# Patient Record
Sex: MALE | Race: WHITE | Employment: OTHER
[De-identification: names, ages, dates, MRNs, and addresses within clinical notes are randomized per-mention and may not be internally consistent; named-entity substitution may affect disease eponyms.]

---

## 2019-02-07 ENCOUNTER — RESULTS ONLY (OUTPATIENT)
Dept: LAB | Age: 79
End: 2019-02-07

## 2019-02-07 ENCOUNTER — MEDICAL CORRESPONDENCE (OUTPATIENT)
Dept: HEALTH INFORMATION MANAGEMENT | Facility: CLINIC | Age: 79
End: 2019-02-07

## 2019-02-07 ENCOUNTER — TRANSFERRED RECORDS (OUTPATIENT)
Dept: HEALTH INFORMATION MANAGEMENT | Facility: CLINIC | Age: 79
End: 2019-02-07

## 2019-02-18 LAB — COPATH REPORT: NORMAL

## 2019-03-12 ENCOUNTER — TRANSFERRED RECORDS (OUTPATIENT)
Dept: HEALTH INFORMATION MANAGEMENT | Facility: CLINIC | Age: 79
End: 2019-03-12

## 2019-04-26 ENCOUNTER — TRANSFERRED RECORDS (OUTPATIENT)
Dept: HEALTH INFORMATION MANAGEMENT | Facility: CLINIC | Age: 79
End: 2019-04-26

## 2019-05-30 ENCOUNTER — APPOINTMENT (OUTPATIENT)
Dept: CT IMAGING | Facility: HOSPITAL | Age: 79
DRG: 907 | End: 2019-05-30
Attending: EMERGENCY MEDICINE
Payer: MEDICARE

## 2019-05-30 ENCOUNTER — HOSPITAL ENCOUNTER (INPATIENT)
Facility: HOSPITAL | Age: 79
LOS: 11 days | Discharge: NURSING, REHAB OR LONG-TERM CARE | DRG: 907 | End: 2019-06-11
Attending: EMERGENCY MEDICINE | Admitting: SURGERY
Payer: MEDICARE

## 2019-05-30 DIAGNOSIS — K56.609 SBO (SMALL BOWEL OBSTRUCTION) (H): ICD-10-CM

## 2019-05-30 DIAGNOSIS — G93.41 ACUTE METABOLIC ENCEPHALOPATHY: Primary | ICD-10-CM

## 2019-05-30 DIAGNOSIS — K63.1 SMALL BOWEL PERFORATION (H): ICD-10-CM

## 2019-05-30 LAB
ANION GAP SERPL CALCULATED.3IONS-SCNC: 11 MMOL/L (ref 3–14)
BASOPHILS # BLD AUTO: 0 10E9/L (ref 0–0.2)
BASOPHILS NFR BLD AUTO: 0.3 %
BUN SERPL-MCNC: 26 MG/DL (ref 7–30)
CALCIUM SERPL-MCNC: 8.9 MG/DL (ref 8.5–10.1)
CHLORIDE SERPL-SCNC: 104 MMOL/L (ref 94–109)
CO2 SERPL-SCNC: 25 MMOL/L (ref 20–32)
CREAT SERPL-MCNC: 1.09 MG/DL (ref 0.66–1.25)
DIFFERENTIAL METHOD BLD: ABNORMAL
EOSINOPHIL # BLD AUTO: 0.1 10E9/L (ref 0–0.7)
EOSINOPHIL NFR BLD AUTO: 0.5 %
ERYTHROCYTE [DISTWIDTH] IN BLOOD BY AUTOMATED COUNT: 16.1 % (ref 10–15)
GFR SERPL CREATININE-BSD FRML MDRD: 63 ML/MIN/{1.73_M2}
GLUCOSE SERPL-MCNC: 170 MG/DL (ref 70–99)
HCT VFR BLD AUTO: 42.2 % (ref 40–53)
HGB BLD-MCNC: 14.4 G/DL (ref 13.3–17.7)
IMM GRANULOCYTES # BLD: 0 10E9/L (ref 0–0.4)
IMM GRANULOCYTES NFR BLD: 0.3 %
LACTATE BLD-SCNC: 2 MMOL/L (ref 0.7–2)
LYMPHOCYTES # BLD AUTO: 1.7 10E9/L (ref 0.8–5.3)
LYMPHOCYTES NFR BLD AUTO: 14.4 %
MCH RBC QN AUTO: 30.4 PG (ref 26.5–33)
MCHC RBC AUTO-ENTMCNC: 34.1 G/DL (ref 31.5–36.5)
MCV RBC AUTO: 89 FL (ref 78–100)
MONOCYTES # BLD AUTO: 0.7 10E9/L (ref 0–1.3)
MONOCYTES NFR BLD AUTO: 5.7 %
NEUTROPHILS # BLD AUTO: 9.3 10E9/L (ref 1.6–8.3)
NEUTROPHILS NFR BLD AUTO: 78.8 %
NRBC # BLD AUTO: 0 10*3/UL
NRBC BLD AUTO-RTO: 0 /100
PLATELET # BLD AUTO: 256 10E9/L (ref 150–450)
POTASSIUM SERPL-SCNC: 3.7 MMOL/L (ref 3.4–5.3)
RBC # BLD AUTO: 4.73 10E12/L (ref 4.4–5.9)
SODIUM SERPL-SCNC: 140 MMOL/L (ref 133–144)
WBC # BLD AUTO: 11.9 10E9/L (ref 4–11)

## 2019-05-30 PROCEDURE — 96374 THER/PROPH/DIAG INJ IV PUSH: CPT

## 2019-05-30 PROCEDURE — 83605 ASSAY OF LACTIC ACID: CPT | Performed by: EMERGENCY MEDICINE

## 2019-05-30 PROCEDURE — 99285 EMERGENCY DEPT VISIT HI MDM: CPT | Mod: 25

## 2019-05-30 PROCEDURE — 74176 CT ABD & PELVIS W/O CONTRAST: CPT | Mod: TC

## 2019-05-30 PROCEDURE — 80048 BASIC METABOLIC PNL TOTAL CA: CPT | Performed by: EMERGENCY MEDICINE

## 2019-05-30 PROCEDURE — 85025 COMPLETE CBC W/AUTO DIFF WBC: CPT | Performed by: EMERGENCY MEDICINE

## 2019-05-30 PROCEDURE — 96361 HYDRATE IV INFUSION ADD-ON: CPT

## 2019-05-30 PROCEDURE — 99285 EMERGENCY DEPT VISIT HI MDM: CPT | Mod: Z6 | Performed by: EMERGENCY MEDICINE

## 2019-05-30 PROCEDURE — 25000128 H RX IP 250 OP 636: Performed by: EMERGENCY MEDICINE

## 2019-05-30 PROCEDURE — 96375 TX/PRO/DX INJ NEW DRUG ADDON: CPT

## 2019-05-30 PROCEDURE — 85610 PROTHROMBIN TIME: CPT | Performed by: EMERGENCY MEDICINE

## 2019-05-30 PROCEDURE — 83880 ASSAY OF NATRIURETIC PEPTIDE: CPT | Performed by: EMERGENCY MEDICINE

## 2019-05-30 PROCEDURE — 25000128 H RX IP 250 OP 636

## 2019-05-30 RX ORDER — HYDROMORPHONE HYDROCHLORIDE 1 MG/ML
0.5 INJECTION, SOLUTION INTRAMUSCULAR; INTRAVENOUS; SUBCUTANEOUS ONCE
Status: COMPLETED | OUTPATIENT
Start: 2019-05-30 | End: 2019-05-30

## 2019-05-30 RX ORDER — ONDANSETRON 2 MG/ML
4 INJECTION INTRAMUSCULAR; INTRAVENOUS ONCE
Status: COMPLETED | OUTPATIENT
Start: 2019-05-30 | End: 2019-05-30

## 2019-05-30 RX ADMIN — HYDROMORPHONE HYDROCHLORIDE 1 MG: 1 INJECTION, SOLUTION INTRAMUSCULAR; INTRAVENOUS; SUBCUTANEOUS at 23:54

## 2019-05-30 RX ADMIN — HYDROMORPHONE HYDROCHLORIDE 0.5 MG: 1 INJECTION, SOLUTION INTRAMUSCULAR; INTRAVENOUS; SUBCUTANEOUS at 23:27

## 2019-05-30 RX ADMIN — SODIUM CHLORIDE 1000 ML: 9 INJECTION, SOLUTION INTRAVENOUS at 23:27

## 2019-05-30 RX ADMIN — ONDANSETRON 4 MG: 2 INJECTION INTRAMUSCULAR; INTRAVENOUS at 23:27

## 2019-05-30 ASSESSMENT — ENCOUNTER SYMPTOMS
ABDOMINAL PAIN: 1
VOMITING: 0
BACK PAIN: 0
NAUSEA: 1
ABDOMINAL DISTENTION: 1
CHEST TIGHTNESS: 0

## 2019-05-30 NOTE — LETTER
[facility]  [address]  [place] [address]  [phone]    FACSIMILE TRANSMITTAL SHEET    TO: ***   COMPANY: ***  FAX NUMBER: ***  PHONE NUMBER: ***     FROM: ***  PHONE: ***  DATE: 06/06/19  NUMBER OF PAGES: ***    _____URGENT _____REVIEW ONLY _____PLEASE COMMENT____PLEASE REPLY    NOTES/COMMENTS: ***                                      IF YOU DID NOT RECEIVE THE CORRECT NUMBER OF PAGES OR THE FAX DID NOT COME THROUGH CLEARLY, PLEASE CALL THE SENDER     CONFIDENTIALITY STATEMENT: Confidential information that may accompany this transmission contains protected health information under state and federal law and is legally privileged. This information is intended only for the use of the individual or entity named above and may be used only for carrying out treatment, payment or other healthcare operations. The recipient or person responsible for delivering this information is prohibited by law from disclosing this information without proper authorization to any other party, unless required to do so by law or regulation. If you are not the intended recipient, you are hereby notified that any review, dissemination, distribution, or copying of this message is strictly prohibited. If you have received this communication in error, please destroy the materials and contact us immediately by calling the number listed above. No response indicates that the information was received by the appropriate authorized party

## 2019-05-30 NOTE — LETTER
[facility]  [address]  [place] [address]  [phone]    FACSIMILE TRANSMITTAL SHEET    TO: ***   COMPANY: ***  FAX NUMBER: ***  PHONE NUMBER: ***     FROM: ***  PHONE: ***  DATE: 06/06/19  NUMBER OF PAGES: ***    _____URGENT _____REVIEW ONLY _____PLEASE COMMENT____PLEASE REPLY    NOTES/COMMENTS: ***                                      IF YOU DID NOT RECEIVE THE CORRECT NUMBER OF PAGES OR THE FAX DID NOT COME THROUGH CLEARLY, PLEASE CALL THE SENDER     CONFIDENTIALITY STATEMENT: Confidential information that may accompany this transmission contains protected health information under state and federal law and is legally privileged. This information is intended only for the use of the individual or entity named above and may be used only for carrying out treatment, payment or other healthcare operations. The recipient or person responsible for delivering this information is prohibited by law from disclosing this information without proper authorization to any other party, unless required to do so by law or regulation. If you are not the intended recipient, you are hereby notified that any review, dissemination, distribution, or copying of this message is strictly prohibited. If you have received this communication in error, please destroy the materials and contact us immediately by calling the number listed above. No response indicates that the information was received by the appropriate authorized party

## 2019-05-31 ENCOUNTER — ANESTHESIA EVENT (OUTPATIENT)
Dept: INTENSIVE CARE | Facility: HOSPITAL | Age: 79
DRG: 907 | End: 2019-05-31
Payer: MEDICARE

## 2019-05-31 ENCOUNTER — ANESTHESIA (OUTPATIENT)
Dept: INTENSIVE CARE | Facility: HOSPITAL | Age: 79
DRG: 907 | End: 2019-05-31
Payer: MEDICARE

## 2019-05-31 ENCOUNTER — ANESTHESIA EVENT (OUTPATIENT)
Dept: SURGERY | Facility: HOSPITAL | Age: 79
DRG: 907 | End: 2019-05-31
Payer: MEDICARE

## 2019-05-31 ENCOUNTER — ANESTHESIA (OUTPATIENT)
Dept: SURGERY | Facility: HOSPITAL | Age: 79
DRG: 907 | End: 2019-05-31
Payer: MEDICARE

## 2019-05-31 ENCOUNTER — APPOINTMENT (OUTPATIENT)
Dept: GENERAL RADIOLOGY | Facility: HOSPITAL | Age: 79
DRG: 907 | End: 2019-05-31
Attending: HOSPITALIST
Payer: MEDICARE

## 2019-05-31 ENCOUNTER — APPOINTMENT (OUTPATIENT)
Dept: GENERAL RADIOLOGY | Facility: HOSPITAL | Age: 79
DRG: 907 | End: 2019-05-31
Attending: NURSE ANESTHETIST, CERTIFIED REGISTERED
Payer: MEDICARE

## 2019-05-31 ENCOUNTER — APPOINTMENT (OUTPATIENT)
Dept: GENERAL RADIOLOGY | Facility: HOSPITAL | Age: 79
DRG: 907 | End: 2019-05-31
Attending: NURSE PRACTITIONER
Payer: MEDICARE

## 2019-05-31 PROBLEM — K63.1 SMALL BOWEL PERFORATION (H): Status: ACTIVE | Noted: 2019-05-31

## 2019-05-31 PROBLEM — A41.9 SEPSIS (H): Status: ACTIVE | Noted: 2019-05-31

## 2019-05-31 PROBLEM — K56.609 SBO (SMALL BOWEL OBSTRUCTION) (H): Status: ACTIVE | Noted: 2019-05-31

## 2019-05-31 PROBLEM — R10.9 ACUTE ABDOMINAL PAIN: Status: ACTIVE | Noted: 2019-05-31

## 2019-05-31 PROBLEM — Z95.0 CARDIAC PACEMAKER IN SITU: Status: ACTIVE | Noted: 2019-05-31

## 2019-05-31 PROBLEM — I48.20 CHRONIC ATRIAL FIBRILLATION (H): Status: ACTIVE | Noted: 2019-05-31

## 2019-05-31 PROBLEM — C18.4 MALIGNANT NEOPLASM OF TRANSVERSE COLON (H): Status: ACTIVE | Noted: 2019-04-16

## 2019-05-31 LAB
ABO + RH BLD: NORMAL
ABO + RH BLD: NORMAL
ALBUMIN UR-MCNC: 10 MG/DL
ANION GAP SERPL CALCULATED.3IONS-SCNC: 12 MMOL/L (ref 3–14)
ANISOCYTOSIS BLD QL SMEAR: SLIGHT
APPEARANCE UR: CLEAR
BACTERIA #/AREA URNS HPF: ABNORMAL /HPF
BASE DEFICIT BLDA-SCNC: 5 MMOL/L
BASE DEFICIT BLDA-SCNC: 6.1 MMOL/L
BASOPHILS # BLD AUTO: 0 10E9/L (ref 0–0.2)
BASOPHILS # BLD AUTO: 0 10E9/L (ref 0–0.2)
BASOPHILS NFR BLD AUTO: 0 %
BASOPHILS NFR BLD AUTO: 0.3 %
BILIRUB UR QL STRIP: NEGATIVE
BUN SERPL-MCNC: 28 MG/DL (ref 7–30)
BUN SERPL-MCNC: 28 MG/DL (ref 7–30)
BUN SERPL-MCNC: 32 MG/DL (ref 7–30)
CALCIUM SERPL-MCNC: 7.6 MG/DL (ref 8.5–10.1)
CALCIUM SERPL-MCNC: 7.7 MG/DL (ref 8.5–10.1)
CALCIUM SERPL-MCNC: 8.4 MG/DL (ref 8.5–10.1)
CHLORIDE SERPL-SCNC: 107 MMOL/L (ref 94–109)
CHLORIDE SERPL-SCNC: 108 MMOL/L (ref 94–109)
CHLORIDE SERPL-SCNC: 109 MMOL/L (ref 94–109)
CO2 SERPL-SCNC: 20 MMOL/L (ref 20–32)
CO2 SERPL-SCNC: 21 MMOL/L (ref 20–32)
CO2 SERPL-SCNC: 22 MMOL/L (ref 20–32)
COLOR UR AUTO: YELLOW
CREAT SERPL-MCNC: 1.08 MG/DL (ref 0.66–1.25)
CREAT SERPL-MCNC: 1.29 MG/DL (ref 0.66–1.25)
CREAT SERPL-MCNC: 1.53 MG/DL (ref 0.66–1.25)
DIFFERENTIAL METHOD BLD: ABNORMAL
DIFFERENTIAL METHOD BLD: ABNORMAL
EOSINOPHIL # BLD AUTO: 0 10E9/L (ref 0–0.7)
EOSINOPHIL # BLD AUTO: 0 10E9/L (ref 0–0.7)
EOSINOPHIL NFR BLD AUTO: 0 %
EOSINOPHIL NFR BLD AUTO: 0 %
ERYTHROCYTE [DISTWIDTH] IN BLOOD BY AUTOMATED COUNT: 16.4 % (ref 10–15)
ERYTHROCYTE [DISTWIDTH] IN BLOOD BY AUTOMATED COUNT: 16.9 % (ref 10–15)
GFR SERPL CREATININE-BSD FRML MDRD: 42 ML/MIN/{1.73_M2}
GFR SERPL CREATININE-BSD FRML MDRD: 52 ML/MIN/{1.73_M2}
GFR SERPL CREATININE-BSD FRML MDRD: 64 ML/MIN/{1.73_M2}
GLUCOSE SERPL-MCNC: 205 MG/DL (ref 70–99)
GLUCOSE SERPL-MCNC: 216 MG/DL (ref 70–99)
GLUCOSE SERPL-MCNC: 229 MG/DL (ref 70–99)
GLUCOSE UR STRIP-MCNC: NEGATIVE MG/DL
HCO3 BLD-SCNC: 21 MMOL/L (ref 21–28)
HCO3 BLD-SCNC: 22 MMOL/L (ref 21–28)
HCT VFR BLD AUTO: 46.8 % (ref 40–53)
HCT VFR BLD AUTO: 48.2 % (ref 40–53)
HGB BLD-MCNC: 15.5 G/DL (ref 13.3–17.7)
HGB BLD-MCNC: 16.4 G/DL (ref 13.3–17.7)
HGB UR QL STRIP: ABNORMAL
IMM GRANULOCYTES # BLD: 0 10E9/L (ref 0–0.4)
IMM GRANULOCYTES NFR BLD: 0.2 %
INR PPP: 1.05 (ref 0.8–1.2)
INR PPP: 1.16 (ref 0.8–1.2)
KETONES UR STRIP-MCNC: NEGATIVE MG/DL
LACTATE BLD-SCNC: 4.3 MMOL/L (ref 0.7–2)
LACTATE BLD-SCNC: 4.5 MMOL/L (ref 0.7–2)
LACTATE BLD-SCNC: 4.6 MMOL/L (ref 0.7–2)
LACTATE BLD-SCNC: 4.6 MMOL/L (ref 0.7–2)
LACTATE BLD-SCNC: 6 MMOL/L (ref 0.7–2)
LEUKOCYTE ESTERASE UR QL STRIP: NEGATIVE
LYMPHOCYTES # BLD AUTO: 0.7 10E9/L (ref 0.8–5.3)
LYMPHOCYTES # BLD AUTO: 1.2 10E9/L (ref 0.8–5.3)
LYMPHOCYTES NFR BLD AUTO: 23 %
LYMPHOCYTES NFR BLD AUTO: 6.1 %
MCH RBC QN AUTO: 30.4 PG (ref 26.5–33)
MCH RBC QN AUTO: 30.6 PG (ref 26.5–33)
MCHC RBC AUTO-ENTMCNC: 33.1 G/DL (ref 31.5–36.5)
MCHC RBC AUTO-ENTMCNC: 34 G/DL (ref 31.5–36.5)
MCV RBC AUTO: 89 FL (ref 78–100)
MCV RBC AUTO: 93 FL (ref 78–100)
METAMYELOCYTES # BLD: 0.8 10E9/L
METAMYELOCYTES NFR BLD MANUAL: 15 %
MONOCYTES # BLD AUTO: 0.4 10E9/L (ref 0–1.3)
MONOCYTES # BLD AUTO: 0.9 10E9/L (ref 0–1.3)
MONOCYTES NFR BLD AUTO: 7 %
MONOCYTES NFR BLD AUTO: 7.6 %
MUCOUS THREADS #/AREA URNS LPF: PRESENT /LPF
MYELOCYTES # BLD: 0.2 10E9/L
MYELOCYTES NFR BLD MANUAL: 3 %
NEUTROPHILS # BLD AUTO: 1.6 10E9/L (ref 1.6–8.3)
NEUTROPHILS # BLD AUTO: 10.2 10E9/L (ref 1.6–8.3)
NEUTROPHILS NFR BLD AUTO: 29 %
NEUTROPHILS NFR BLD AUTO: 85.8 %
NEUTS BAND # BLD AUTO: 1.2 10E9/L (ref 0–0.6)
NEUTS BAND NFR BLD MANUAL: 23 %
NITRATE UR QL: NEGATIVE
NRBC # BLD AUTO: 0 10*3/UL
NRBC BLD AUTO-RTO: 0 /100
NT-PROBNP SERPL-MCNC: 73 PG/ML (ref 0–1800)
O2/TOTAL GAS SETTING VFR VENT: ABNORMAL %
O2/TOTAL GAS SETTING VFR VENT: ABNORMAL %
OXYHGB MFR BLD: 94 % (ref 92–100)
OXYHGB MFR BLD: 95 % (ref 92–100)
PCO2 BLD: 44 MM HG (ref 35–45)
PCO2 BLD: 49 MM HG (ref 35–45)
PH BLD: 7.25 PH (ref 7.35–7.45)
PH BLD: 7.3 PH (ref 7.35–7.45)
PH UR STRIP: 5.5 PH (ref 4.7–8)
PLATELET # BLD AUTO: 260 10E9/L (ref 150–450)
PLATELET # BLD AUTO: 261 10E9/L (ref 150–450)
PLATELET # BLD EST: ABNORMAL 10*3/UL
PO2 BLD: 85 MM HG (ref 80–105)
PO2 BLD: 85 MM HG (ref 80–105)
POTASSIUM SERPL-SCNC: 3.4 MMOL/L (ref 3.4–5.3)
POTASSIUM SERPL-SCNC: 3.9 MMOL/L (ref 3.4–5.3)
POTASSIUM SERPL-SCNC: 4 MMOL/L (ref 3.4–5.3)
PROCALCITONIN SERPL-MCNC: 15.54 NG/ML
RBC # BLD AUTO: 5.06 10E12/L (ref 4.4–5.9)
RBC # BLD AUTO: 5.4 10E12/L (ref 4.4–5.9)
RBC #/AREA URNS AUTO: 6 /HPF (ref 0–2)
RBC MORPH BLD: ABNORMAL
SODIUM SERPL-SCNC: 140 MMOL/L (ref 133–144)
SODIUM SERPL-SCNC: 141 MMOL/L (ref 133–144)
SODIUM SERPL-SCNC: 142 MMOL/L (ref 133–144)
SOURCE: ABNORMAL
SP GR UR STRIP: 1.02 (ref 1–1.03)
SPECIMEN EXP DATE BLD: NORMAL
T4 FREE SERPL-MCNC: 1.25 NG/DL (ref 0.76–1.46)
TOXIC GRANULES BLD QL SMEAR: PRESENT
TROPONIN I SERPL-MCNC: 0.07 UG/L (ref 0–0.04)
TROPONIN I SERPL-MCNC: 0.08 UG/L (ref 0–0.04)
TSH SERPL DL<=0.005 MIU/L-ACNC: 0.25 MU/L (ref 0.4–4)
UROBILINOGEN UR STRIP-MCNC: NORMAL MG/DL (ref 0–2)
VARIANT LYMPHS BLD QL SMEAR: PRESENT
WBC # BLD AUTO: 11.9 10E9/L (ref 4–11)
WBC # BLD AUTO: 5.4 10E9/L (ref 4–11)
WBC #/AREA URNS AUTO: <1 /HPF (ref 0–5)

## 2019-05-31 PROCEDURE — 25800030 ZZH RX IP 258 OP 636: Performed by: HOSPITALIST

## 2019-05-31 PROCEDURE — 25000128 H RX IP 250 OP 636: Performed by: SURGERY

## 2019-05-31 PROCEDURE — 36415 COLL VENOUS BLD VENIPUNCTURE: CPT | Performed by: HOSPITALIST

## 2019-05-31 PROCEDURE — 25000125 ZZHC RX 250: Performed by: NURSE ANESTHETIST, CERTIFIED REGISTERED

## 2019-05-31 PROCEDURE — 36000058 ZZH SURGERY LEVEL 3 EA 15 ADDTL MIN: Performed by: SURGERY

## 2019-05-31 PROCEDURE — 25000128 H RX IP 250 OP 636: Performed by: NURSE ANESTHETIST, CERTIFIED REGISTERED

## 2019-05-31 PROCEDURE — 87040 BLOOD CULTURE FOR BACTERIA: CPT | Performed by: NURSE PRACTITIONER

## 2019-05-31 PROCEDURE — 94002 VENT MGMT INPAT INIT DAY: CPT

## 2019-05-31 PROCEDURE — 40000275 ZZH STATISTIC RCP TIME EA 10 MIN

## 2019-05-31 PROCEDURE — 40000986 XR CHEST PORT 1 VW: Mod: TC

## 2019-05-31 PROCEDURE — 36415 COLL VENOUS BLD VENIPUNCTURE: CPT | Performed by: NURSE PRACTITIONER

## 2019-05-31 PROCEDURE — 25000128 H RX IP 250 OP 636: Performed by: HOSPITALIST

## 2019-05-31 PROCEDURE — 25000128 H RX IP 250 OP 636: Performed by: NURSE PRACTITIONER

## 2019-05-31 PROCEDURE — 25000125 ZZHC RX 250: Performed by: NURSE PRACTITIONER

## 2019-05-31 PROCEDURE — 25800030 ZZH RX IP 258 OP 636: Performed by: NURSE ANESTHETIST, CERTIFIED REGISTERED

## 2019-05-31 PROCEDURE — 36556 INSERT NON-TUNNEL CV CATH: CPT | Performed by: SURGERY

## 2019-05-31 PROCEDURE — 0DBA0ZZ EXCISION OF JEJUNUM, OPEN APPROACH: ICD-10-PCS | Performed by: SURGERY

## 2019-05-31 PROCEDURE — 37000009 ZZH ANESTHESIA TECHNICAL FEE, EACH ADDTL 15 MIN: Performed by: SURGERY

## 2019-05-31 PROCEDURE — 25800030 ZZH RX IP 258 OP 636: Performed by: NURSE PRACTITIONER

## 2019-05-31 PROCEDURE — 86901 BLOOD TYPING SEROLOGIC RH(D): CPT | Performed by: NURSE PRACTITIONER

## 2019-05-31 PROCEDURE — 05HN33Z INSERTION OF INFUSION DEVICE INTO LEFT INTERNAL JUGULAR VEIN, PERCUTANEOUS APPROACH: ICD-10-PCS | Performed by: SURGERY

## 2019-05-31 PROCEDURE — 83605 ASSAY OF LACTIC ACID: CPT | Performed by: NURSE PRACTITIONER

## 2019-05-31 PROCEDURE — 86900 BLOOD TYPING SEROLOGIC ABO: CPT | Performed by: NURSE PRACTITIONER

## 2019-05-31 PROCEDURE — 82805 BLOOD GASES W/O2 SATURATION: CPT | Performed by: NURSE PRACTITIONER

## 2019-05-31 PROCEDURE — 85025 COMPLETE CBC W/AUTO DIFF WBC: CPT | Performed by: HOSPITALIST

## 2019-05-31 PROCEDURE — 81001 URINALYSIS AUTO W/SCOPE: CPT | Performed by: NURSE PRACTITIONER

## 2019-05-31 PROCEDURE — 44110 EXCISE INTESTINE LESION(S): CPT | Performed by: NURSE ANESTHETIST, CERTIFIED REGISTERED

## 2019-05-31 PROCEDURE — 37000008 ZZH ANESTHESIA TECHNICAL FEE, 1ST 30 MIN: Performed by: SURGERY

## 2019-05-31 PROCEDURE — 71045 X-RAY EXAM CHEST 1 VIEW: CPT | Mod: TC

## 2019-05-31 PROCEDURE — 27210794 ZZH OR GENERAL SUPPLY STERILE: Performed by: SURGERY

## 2019-05-31 PROCEDURE — B544ZZA ULTRASONOGRAPHY OF LEFT JUGULAR VEINS, GUIDANCE: ICD-10-PCS | Performed by: SURGERY

## 2019-05-31 PROCEDURE — 36000056 ZZH SURGERY LEVEL 3 1ST 30 MIN: Performed by: SURGERY

## 2019-05-31 PROCEDURE — 85025 COMPLETE CBC W/AUTO DIFF WBC: CPT | Performed by: NURSE PRACTITIONER

## 2019-05-31 PROCEDURE — 84443 ASSAY THYROID STIM HORMONE: CPT | Performed by: NURSE PRACTITIONER

## 2019-05-31 PROCEDURE — 80048 BASIC METABOLIC PNL TOTAL CA: CPT | Performed by: NURSE PRACTITIONER

## 2019-05-31 PROCEDURE — 36620 INSERTION CATHETER ARTERY: CPT | Mod: 59 | Performed by: SURGERY

## 2019-05-31 PROCEDURE — 99100 ANES PT EXTEME AGE<1 YR&>70: CPT | Performed by: NURSE ANESTHETIST, CERTIFIED REGISTERED

## 2019-05-31 PROCEDURE — 99222 1ST HOSP IP/OBS MODERATE 55: CPT | Mod: 57 | Performed by: SURGERY

## 2019-05-31 PROCEDURE — 84439 ASSAY OF FREE THYROXINE: CPT | Performed by: NURSE PRACTITIONER

## 2019-05-31 PROCEDURE — 84145 PROCALCITONIN (PCT): CPT | Performed by: NURSE PRACTITIONER

## 2019-05-31 PROCEDURE — 27110028 ZZH OR GENERAL SUPPLY NON-STERILE: Performed by: SURGERY

## 2019-05-31 PROCEDURE — C9113 INJ PANTOPRAZOLE SODIUM, VIA: HCPCS | Performed by: NURSE PRACTITIONER

## 2019-05-31 PROCEDURE — 85610 PROTHROMBIN TIME: CPT | Performed by: NURSE PRACTITIONER

## 2019-05-31 PROCEDURE — 25800030 ZZH RX IP 258 OP 636: Performed by: SURGERY

## 2019-05-31 PROCEDURE — 44110 EXCISE INTESTINE LESION(S): CPT | Mod: 80 | Performed by: SURGERY

## 2019-05-31 PROCEDURE — 44110 EXCISE INTESTINE LESION(S): CPT | Performed by: SURGERY

## 2019-05-31 PROCEDURE — 40000986 XR CHEST PORT 1 VW

## 2019-05-31 PROCEDURE — 00HU33Z INSERTION OF INFUSION DEVICE INTO SPINAL CANAL, PERCUTANEOUS APPROACH: ICD-10-PCS | Performed by: SURGERY

## 2019-05-31 PROCEDURE — 84484 ASSAY OF TROPONIN QUANT: CPT | Performed by: HOSPITALIST

## 2019-05-31 PROCEDURE — 40000786 ZZHCL STATISTIC ACTIVE MRSA SURVEILLANCE CULTURE: Performed by: HOSPITALIST

## 2019-05-31 PROCEDURE — 3E0R3BZ INTRODUCTION OF ANESTHETIC AGENT INTO SPINAL CANAL, PERCUTANEOUS APPROACH: ICD-10-PCS | Performed by: NURSE ANESTHETIST, CERTIFIED REGISTERED

## 2019-05-31 PROCEDURE — 20000003 ZZH R&B ICU

## 2019-05-31 PROCEDURE — 99291 CRITICAL CARE FIRST HOUR: CPT | Performed by: NURSE PRACTITIONER

## 2019-05-31 PROCEDURE — 88307 TISSUE EXAM BY PATHOLOGIST: CPT | Mod: TC | Performed by: SURGERY

## 2019-05-31 PROCEDURE — 84484 ASSAY OF TROPONIN QUANT: CPT | Performed by: NURSE PRACTITIONER

## 2019-05-31 PROCEDURE — 40000306 ZZH STATISTIC PRE PROC ASSESS II: Performed by: SURGERY

## 2019-05-31 PROCEDURE — 83605 ASSAY OF LACTIC ACID: CPT | Performed by: HOSPITALIST

## 2019-05-31 RX ORDER — SODIUM CHLORIDE, SODIUM LACTATE, POTASSIUM CHLORIDE, CALCIUM CHLORIDE 600; 310; 30; 20 MG/100ML; MG/100ML; MG/100ML; MG/100ML
INJECTION, SOLUTION INTRAVENOUS CONTINUOUS PRN
Status: DISCONTINUED | OUTPATIENT
Start: 2019-05-31 | End: 2019-05-31

## 2019-05-31 RX ORDER — NALOXONE HYDROCHLORIDE 0.4 MG/ML
.1-.4 INJECTION, SOLUTION INTRAMUSCULAR; INTRAVENOUS; SUBCUTANEOUS
Status: DISCONTINUED | OUTPATIENT
Start: 2019-05-31 | End: 2019-05-31

## 2019-05-31 RX ORDER — DEXAMETHASONE SODIUM PHOSPHATE 10 MG/ML
INJECTION, SOLUTION INTRAMUSCULAR; INTRAVENOUS PRN
Status: DISCONTINUED | OUTPATIENT
Start: 2019-05-31 | End: 2019-05-31

## 2019-05-31 RX ORDER — DIPHENHYDRAMINE HCL 12.5MG/5ML
12.5 LIQUID (ML) ORAL EVERY 6 HOURS PRN
Status: DISCONTINUED | OUTPATIENT
Start: 2019-05-31 | End: 2019-05-31

## 2019-05-31 RX ORDER — CEFAZOLIN SODIUM 1 G/3ML
1 INJECTION, POWDER, FOR SOLUTION INTRAMUSCULAR; INTRAVENOUS SEE ADMIN INSTRUCTIONS
Status: DISCONTINUED | OUTPATIENT
Start: 2019-05-31 | End: 2019-05-31 | Stop reason: HOSPADM

## 2019-05-31 RX ORDER — NALBUPHINE HYDROCHLORIDE 20 MG/ML
2.5-5 INJECTION, SOLUTION INTRAMUSCULAR; INTRAVENOUS; SUBCUTANEOUS EVERY 6 HOURS PRN
Status: DISCONTINUED | OUTPATIENT
Start: 2019-05-31 | End: 2019-05-31

## 2019-05-31 RX ORDER — NALOXONE HYDROCHLORIDE 0.4 MG/ML
.1-.4 INJECTION, SOLUTION INTRAMUSCULAR; INTRAVENOUS; SUBCUTANEOUS
Status: DISCONTINUED | OUTPATIENT
Start: 2019-05-31 | End: 2019-06-11 | Stop reason: HOSPADM

## 2019-05-31 RX ORDER — DIPHENHYDRAMINE HYDROCHLORIDE 50 MG/ML
12.5 INJECTION INTRAMUSCULAR; INTRAVENOUS EVERY 6 HOURS PRN
Status: DISCONTINUED | OUTPATIENT
Start: 2019-05-31 | End: 2019-05-31

## 2019-05-31 RX ORDER — PROPOFOL 10 MG/ML
INJECTION, EMULSION INTRAVENOUS PRN
Status: DISCONTINUED | OUTPATIENT
Start: 2019-05-31 | End: 2019-05-31

## 2019-05-31 RX ORDER — CEFAZOLIN SODIUM 2 G/100ML
2 INJECTION, SOLUTION INTRAVENOUS
Status: COMPLETED | OUTPATIENT
Start: 2019-05-31 | End: 2019-05-31

## 2019-05-31 RX ORDER — LIDOCAINE HYDROCHLORIDE AND EPINEPHRINE 15; 5 MG/ML; UG/ML
INJECTION, SOLUTION EPIDURAL PRN
Status: DISCONTINUED | OUTPATIENT
Start: 2019-05-31 | End: 2019-05-31

## 2019-05-31 RX ORDER — PROPOFOL 10 MG/ML
5-75 INJECTION, EMULSION INTRAVENOUS CONTINUOUS
Status: DISCONTINUED | OUTPATIENT
Start: 2019-05-31 | End: 2019-06-02

## 2019-05-31 RX ORDER — CHLORHEXIDINE GLUCONATE ORAL RINSE 1.2 MG/ML
15 SOLUTION DENTAL EVERY 12 HOURS
Status: DISCONTINUED | OUTPATIENT
Start: 2019-05-31 | End: 2019-06-02

## 2019-05-31 RX ORDER — ONDANSETRON 2 MG/ML
4 INJECTION INTRAMUSCULAR; INTRAVENOUS EVERY 6 HOURS PRN
Status: DISCONTINUED | OUTPATIENT
Start: 2019-05-31 | End: 2019-06-02

## 2019-05-31 RX ORDER — ONDANSETRON 2 MG/ML
INJECTION INTRAMUSCULAR; INTRAVENOUS PRN
Status: DISCONTINUED | OUTPATIENT
Start: 2019-05-31 | End: 2019-05-31

## 2019-05-31 RX ORDER — HEPARIN SODIUM 5000 [USP'U]/.5ML
5000 INJECTION, SOLUTION INTRAVENOUS; SUBCUTANEOUS
Status: COMPLETED | OUTPATIENT
Start: 2019-05-31 | End: 2019-05-31

## 2019-05-31 RX ORDER — PROPOFOL 10 MG/ML
INJECTION, EMULSION INTRAVENOUS
Status: DISCONTINUED
Start: 2019-05-31 | End: 2019-05-31 | Stop reason: HOSPADM

## 2019-05-31 RX ORDER — FENTANYL CITRATE 50 UG/ML
INJECTION, SOLUTION INTRAMUSCULAR; INTRAVENOUS PRN
Status: DISCONTINUED | OUTPATIENT
Start: 2019-05-31 | End: 2019-05-31

## 2019-05-31 RX ORDER — FUROSEMIDE 10 MG/ML
INJECTION INTRAMUSCULAR; INTRAVENOUS PRN
Status: DISCONTINUED | OUTPATIENT
Start: 2019-05-31 | End: 2019-05-31

## 2019-05-31 RX ORDER — LIDOCAINE HYDROCHLORIDE 20 MG/ML
INJECTION, SOLUTION INFILTRATION; PERINEURAL PRN
Status: DISCONTINUED | OUTPATIENT
Start: 2019-05-31 | End: 2019-05-31

## 2019-05-31 RX ORDER — SODIUM CHLORIDE, SODIUM LACTATE, POTASSIUM CHLORIDE, CALCIUM CHLORIDE 600; 310; 30; 20 MG/100ML; MG/100ML; MG/100ML; MG/100ML
INJECTION, SOLUTION INTRAVENOUS CONTINUOUS
Status: DISCONTINUED | OUTPATIENT
Start: 2019-05-31 | End: 2019-06-02

## 2019-05-31 RX ORDER — ONDANSETRON 4 MG/1
4 TABLET, ORALLY DISINTEGRATING ORAL EVERY 6 HOURS PRN
Status: DISCONTINUED | OUTPATIENT
Start: 2019-05-31 | End: 2019-05-31

## 2019-05-31 RX ORDER — LIDOCAINE 40 MG/G
CREAM TOPICAL
Status: DISCONTINUED | OUTPATIENT
Start: 2019-05-31 | End: 2019-05-31

## 2019-05-31 RX ORDER — SODIUM CHLORIDE, SODIUM LACTATE, POTASSIUM CHLORIDE, CALCIUM CHLORIDE 600; 310; 30; 20 MG/100ML; MG/100ML; MG/100ML; MG/100ML
INJECTION, SOLUTION INTRAVENOUS CONTINUOUS
Status: DISCONTINUED | OUTPATIENT
Start: 2019-05-31 | End: 2019-05-31

## 2019-05-31 RX ORDER — HYDROMORPHONE HYDROCHLORIDE 1 MG/ML
.3-.5 INJECTION, SOLUTION INTRAMUSCULAR; INTRAVENOUS; SUBCUTANEOUS
Status: DISCONTINUED | OUTPATIENT
Start: 2019-05-31 | End: 2019-05-31

## 2019-05-31 RX ORDER — PROPOFOL 10 MG/ML
10-20 INJECTION, EMULSION INTRAVENOUS EVERY 30 MIN PRN
Status: DISCONTINUED | OUTPATIENT
Start: 2019-05-31 | End: 2019-06-02

## 2019-05-31 RX ORDER — PHENYLEPHRINE HCL IN 0.9% NACL 1 MG/10 ML
SYRINGE (ML) INTRAVENOUS PRN
Status: DISCONTINUED | OUTPATIENT
Start: 2019-05-31 | End: 2019-05-31

## 2019-05-31 RX ORDER — NALBUPHINE HYDROCHLORIDE 20 MG/ML
2.5-5 INJECTION, SOLUTION INTRAMUSCULAR; INTRAVENOUS; SUBCUTANEOUS EVERY 6 HOURS PRN
Status: DISCONTINUED | OUTPATIENT
Start: 2019-05-31 | End: 2019-06-11 | Stop reason: HOSPADM

## 2019-05-31 RX ORDER — NALOXONE HYDROCHLORIDE 0.4 MG/ML
.1-.4 INJECTION, SOLUTION INTRAMUSCULAR; INTRAVENOUS; SUBCUTANEOUS
Status: DISCONTINUED | OUTPATIENT
Start: 2019-05-31 | End: 2019-06-02

## 2019-05-31 RX ORDER — IPRATROPIUM BROMIDE AND ALBUTEROL SULFATE 2.5; .5 MG/3ML; MG/3ML
3 SOLUTION RESPIRATORY (INHALATION) EVERY 4 HOURS PRN
Status: DISCONTINUED | OUTPATIENT
Start: 2019-05-31 | End: 2019-06-11 | Stop reason: HOSPADM

## 2019-05-31 RX ORDER — EPHEDRINE SULFATE 50 MG/ML
INJECTION, SOLUTION INTRAMUSCULAR; INTRAVENOUS; SUBCUTANEOUS PRN
Status: DISCONTINUED | OUTPATIENT
Start: 2019-05-31 | End: 2019-05-31

## 2019-05-31 RX ORDER — ONDANSETRON 2 MG/ML
4 INJECTION INTRAMUSCULAR; INTRAVENOUS EVERY 6 HOURS PRN
Status: DISCONTINUED | OUTPATIENT
Start: 2019-05-31 | End: 2019-05-31

## 2019-05-31 RX ADMIN — PANTOPRAZOLE SODIUM 40 MG: 40 INJECTION, POWDER, FOR SOLUTION INTRAVENOUS at 20:23

## 2019-05-31 RX ADMIN — SODIUM CHLORIDE, POTASSIUM CHLORIDE, SODIUM LACTATE AND CALCIUM CHLORIDE 1000 ML: 600; 310; 30; 20 INJECTION, SOLUTION INTRAVENOUS at 21:19

## 2019-05-31 RX ADMIN — Medication 10 MG: at 12:05

## 2019-05-31 RX ADMIN — ONDANSETRON 4 MG: 2 INJECTION INTRAMUSCULAR; INTRAVENOUS at 12:57

## 2019-05-31 RX ADMIN — PROPOFOL 45 MCG/KG/MIN: 10 INJECTION, EMULSION INTRAVENOUS at 20:24

## 2019-05-31 RX ADMIN — ENOXAPARIN SODIUM 40 MG: 40 INJECTION SUBCUTANEOUS at 06:30

## 2019-05-31 RX ADMIN — SODIUM CHLORIDE, POTASSIUM CHLORIDE, SODIUM LACTATE AND CALCIUM CHLORIDE 2000 ML: 600; 310; 30; 20 INJECTION, SOLUTION INTRAVENOUS at 09:51

## 2019-05-31 RX ADMIN — HEPARIN SODIUM 5000 UNITS: 5000 INJECTION, SOLUTION INTRAVENOUS; SUBCUTANEOUS at 10:21

## 2019-05-31 RX ADMIN — LIDOCAINE HYDROCHLORIDE 40 MG: 20 INJECTION, SOLUTION INFILTRATION; PERINEURAL at 11:51

## 2019-05-31 RX ADMIN — HYDROMORPHONE HYDROCHLORIDE 0.5 MG: 1 INJECTION, SOLUTION INTRAMUSCULAR; INTRAVENOUS; SUBCUTANEOUS at 06:30

## 2019-05-31 RX ADMIN — TAZOBACTAM SODIUM AND PIPERACILLIN SODIUM 4.5 G: 500; 4 INJECTION, SOLUTION INTRAVENOUS at 21:06

## 2019-05-31 RX ADMIN — Medication 100 MG: at 11:51

## 2019-05-31 RX ADMIN — TAZOBACTAM SODIUM AND PIPERACILLIN SODIUM 4.5 G: 500; 4 INJECTION, SOLUTION INTRAVENOUS at 16:18

## 2019-05-31 RX ADMIN — CHLORHEXIDINE GLUCONATE ORAL RINSE 15 ML: 1.2 SOLUTION DENTAL at 20:01

## 2019-05-31 RX ADMIN — HYDROMORPHONE HYDROCHLORIDE 0.5 MG: 1 INJECTION, SOLUTION INTRAMUSCULAR; INTRAVENOUS; SUBCUTANEOUS at 04:08

## 2019-05-31 RX ADMIN — ROCURONIUM BROMIDE 40 MG: 10 INJECTION INTRAVENOUS at 12:15

## 2019-05-31 RX ADMIN — SODIUM BICARBONATE 25 MEQ: 84 INJECTION INTRAVENOUS at 12:15

## 2019-05-31 RX ADMIN — NOREPINEPHRINE BITARTRATE 0.03 MCG/KG/MIN: 1 INJECTION, SOLUTION, CONCENTRATE INTRAVENOUS at 15:21

## 2019-05-31 RX ADMIN — FUROSEMIDE 10 MG: 10 INJECTION, SOLUTION INTRAVENOUS at 11:25

## 2019-05-31 RX ADMIN — FENTANYL CITRATE 50 MCG: 50 INJECTION, SOLUTION INTRAMUSCULAR; INTRAVENOUS at 11:51

## 2019-05-31 RX ADMIN — EPINEPHRINE 0.1 MG: 1 INJECTION INTRAMUSCULAR; INTRAVENOUS; SUBCUTANEOUS at 12:09

## 2019-05-31 RX ADMIN — HYDROMORPHONE HYDROCHLORIDE 0.5 MG: 1 INJECTION, SOLUTION INTRAMUSCULAR; INTRAVENOUS; SUBCUTANEOUS at 09:40

## 2019-05-31 RX ADMIN — PROPOFOL 100 MG: 10 INJECTION, EMULSION INTRAVENOUS at 11:51

## 2019-05-31 RX ADMIN — SODIUM CHLORIDE, POTASSIUM CHLORIDE, SODIUM LACTATE AND CALCIUM CHLORIDE: 600; 310; 30; 20 INJECTION, SOLUTION INTRAVENOUS at 11:40

## 2019-05-31 RX ADMIN — FENTANYL CITRATE 25 MCG/HR: 50 INJECTION, SOLUTION INTRAMUSCULAR; INTRAVENOUS at 16:10

## 2019-05-31 RX ADMIN — SODIUM CHLORIDE, POTASSIUM CHLORIDE, SODIUM LACTATE AND CALCIUM CHLORIDE: 600; 310; 30; 20 INJECTION, SOLUTION INTRAVENOUS at 11:41

## 2019-05-31 RX ADMIN — DEXAMETHASONE SODIUM PHOSPHATE 10 MG: 10 INJECTION, SOLUTION INTRAMUSCULAR; INTRAVENOUS at 12:30

## 2019-05-31 RX ADMIN — TAZOBACTAM SODIUM AND PIPERACILLIN SODIUM 4.5 G: 500; 4 INJECTION, SOLUTION INTRAVENOUS at 09:09

## 2019-05-31 RX ADMIN — PROPOFOL 45 MCG/KG/MIN: 10 INJECTION, EMULSION INTRAVENOUS at 16:47

## 2019-05-31 RX ADMIN — Medication 200 MCG: at 12:02

## 2019-05-31 RX ADMIN — SODIUM CHLORIDE, POTASSIUM CHLORIDE, SODIUM LACTATE AND CALCIUM CHLORIDE: 600; 310; 30; 20 INJECTION, SOLUTION INTRAVENOUS at 09:59

## 2019-05-31 RX ADMIN — HYDROMORPHONE HYDROCHLORIDE 1 MG: 1 INJECTION, SOLUTION INTRAMUSCULAR; INTRAVENOUS; SUBCUTANEOUS at 08:11

## 2019-05-31 RX ADMIN — Medication 1 ML: at 12:25

## 2019-05-31 RX ADMIN — Medication 3 ML/HR: at 13:20

## 2019-05-31 RX ADMIN — SODIUM CHLORIDE, POTASSIUM CHLORIDE, SODIUM LACTATE AND CALCIUM CHLORIDE 1000 ML: 600; 310; 30; 20 INJECTION, SOLUTION INTRAVENOUS at 18:50

## 2019-05-31 RX ADMIN — SODIUM CHLORIDE, POTASSIUM CHLORIDE, SODIUM LACTATE AND CALCIUM CHLORIDE: 600; 310; 30; 20 INJECTION, SOLUTION INTRAVENOUS at 15:21

## 2019-05-31 RX ADMIN — Medication 10 MG: at 12:06

## 2019-05-31 RX ADMIN — Medication 1 ML: at 12:17

## 2019-05-31 RX ADMIN — ROCURONIUM BROMIDE 10 MG: 10 INJECTION INTRAVENOUS at 11:51

## 2019-05-31 RX ADMIN — LIDOCAINE HYDROCHLORIDE AND EPINEPHRINE 3 ML: 15; 5 INJECTION, SOLUTION EPIDURAL; INFILTRATION; INTRACAUDAL; PERINEURAL at 11:31

## 2019-05-31 RX ADMIN — CEFAZOLIN SODIUM 2 G: 2 INJECTION, SOLUTION INTRAVENOUS at 11:41

## 2019-05-31 RX ADMIN — SODIUM CHLORIDE, POTASSIUM CHLORIDE, SODIUM LACTATE AND CALCIUM CHLORIDE 1000 ML: 600; 310; 30; 20 INJECTION, SOLUTION INTRAVENOUS at 18:18

## 2019-05-31 RX ADMIN — SODIUM CHLORIDE, POTASSIUM CHLORIDE, SODIUM LACTATE AND CALCIUM CHLORIDE: 600; 310; 30; 20 INJECTION, SOLUTION INTRAVENOUS at 04:04

## 2019-05-31 RX ADMIN — Medication 10 MG: at 12:03

## 2019-05-31 RX ADMIN — Medication 200 MCG: at 11:59

## 2019-05-31 RX ADMIN — SODIUM CHLORIDE, POTASSIUM CHLORIDE, SODIUM LACTATE AND CALCIUM CHLORIDE 1000 ML: 600; 310; 30; 20 INJECTION, SOLUTION INTRAVENOUS at 08:49

## 2019-05-31 RX ADMIN — ROCURONIUM BROMIDE 50 MG: 10 INJECTION INTRAVENOUS at 13:00

## 2019-05-31 RX ADMIN — Medication 200 MCG: at 11:56

## 2019-05-31 RX ADMIN — Medication 3 ML/HR: at 13:30

## 2019-05-31 RX ADMIN — Medication 200 MCG: at 12:04

## 2019-05-31 RX ADMIN — PHENYLEPHRINE HYDROCHLORIDE 0.2 MCG/KG/MIN: 10 INJECTION INTRAVENOUS at 12:27

## 2019-05-31 RX ADMIN — PROPOFOL 30 MCG/KG/MIN: 10 INJECTION, EMULSION INTRAVENOUS at 13:20

## 2019-05-31 RX ADMIN — Medication 10 MG: at 12:04

## 2019-05-31 RX ADMIN — Medication 10 MG: at 12:10

## 2019-05-31 ASSESSMENT — ACTIVITIES OF DAILY LIVING (ADL)
ADLS_ACUITY_SCORE: 13
ADLS_ACUITY_SCORE: 16
ADLS_ACUITY_SCORE: 13
ADLS_ACUITY_SCORE: 11

## 2019-05-31 ASSESSMENT — LIFESTYLE VARIABLES: TOBACCO_USE: 1

## 2019-05-31 ASSESSMENT — ENCOUNTER SYMPTOMS: DYSRHYTHMIAS: 1

## 2019-05-31 NOTE — ANESTHESIA PROCEDURE NOTES
Peripheral nerve/Neuraxial procedure note : epidural catheter  Pre-Procedure    Location: pre-op    Procedure Times:5/31/2019 11:26 AM and 5/31/2019 11:33 AM  Pre-Anesthestic Checklist: patient identified, IV checked, site marked, risks and benefits discussed, informed consent, monitors and equipment checked, pre-op evaluation, at physician/surgeon's request and post-op pain management    Timeout  Correct Patient: Yes   Correct Procedure: Yes   Correct Site: Yes   Correct Laterality: Yes   Correct Position: Yes   Site Marked: Yes   .   Procedure Documentation    Diagnosis:Postop pain.    Procedure:    Epidural catheter.  Insertion Site:T10-11 Injection technique: LORT saline   Local skin infiltrated with 2 mL of 1% lidocaine.  SHLOMO at 7 cm     Patient Prep;chlorhexidine gluconate and isopropyl alcohol.  .  Needle: ToBarcodingy needle Needle Gauge: 18.    Needle Length (Inches) 5  # of attempts: 2 and  # of redirects:  1 .   Catheter: 20 G . .  Catheter threaded easily  7 cm epidural space.  12 cm at skin.   .    Assessment/Narrative  Paresthesias: No.  .  .  Aspiration negative for heme or CSF  . Test dose of 3 mL lidocaine 1.5% w/ 1:200,000 epinephrine at 11:31.  Test dose negative for signs of intravascular, subdural or intrathecal injection.

## 2019-05-31 NOTE — CONSULTS
[facility]    General Surgery Consultation    Date of Admission:  5/30/2019    Assessment & Plan   Ag Perez is a 79 year old male who was admitted on 5/30/2019. I was asked to see the patient for abdominal pain, incisional hernia.    At this time I recommend that the patient be admitted for observation. Due to his multiple medical co-morbidities I would appreciate hospitalist admission with surgery to follow. It appears that he has an incisional hernia with a partial small bowel obstruction. Unfortunately the CT scan performed this evening was done without oral or IV contrast thus delineating the hernia and its contents difficult. The patient should be kept NPO and if there is any emesis then an NGT can be placed but no need at this time. This morning I will evaluate for possible gastrograffin challenge. There is no urgent or emergent surgical needs at this time. Please call with questions.    Pastor Vilchis    Code Status    No Order    Reason for Consult   Reason for consult: I was asked by Dr. Shields to evaluate this patient for abdominal pain.    Primary Care Physician   Ary Montanez    Chief Complaint   Abdominal pain    History is obtained from the patient    History of Present Illness   Ag Perez is a 79 year old male who presents with abdominal pain. He says that early today he developed acute onset abdominal pain. He was going to the bathroom and per nursing reports it sounds like he felt a pop then developed abdominal pain. He has had no fevers or chills. He says that he has been able to eat without and emesis. His last normal bowel movement was yesterday. He recently had a mario-colectomy for adenocarcinoma approximately 6 weeks ago. He says his last follow up with his surgeon was done at the two week ai. He elected not to undergo chemotherapy for his stage IIIB colon adenocarcinoma.     He says that the ride over wasn't bad and he didn't have much pain. He says that  when he moves around he has pain but if he stays still he doesn't have much pain. He described the pain as colicky in nature.    Past Medical History   I have reviewed this patient's medical history and updated it with pertinent information if needed.   Past Medical History:   Diagnosis Date     Arrhythmia      Hypertension      Pacemaker        Past Surgical History   I have reviewed this patient's surgical history and updated it with pertinent information if needed.  Past Surgical History:   Procedure Laterality Date     CARDIAC SURGERY       COLONOSCOPY       COLONOSCOPY N/A 7/24/2015    Procedure: COLONOSCOPY;  Surgeon: Kameron De Santiago MD;  Location: [facility]       Prior to Admission Medications   Prior to Admission Medications   Prescriptions Last Dose Informant Patient Reported? Taking?   ASPIRIN PO 5/30/2019 at Unknown time  Yes Yes   Sig: Take 81 mg by mouth daily   BENAZEPRIL HCL PO 5/30/2019 at Unknown time  Yes Yes   Sig: Take 40 mg by mouth daily   Ezetimibe (ZETIA PO) 5/30/2019 at Unknown time  Yes Yes   Sig: Take 10 mg by mouth At Bedtime   HYDROCHLOROTHIAZIDE PO 5/30/2019 at Unknown time  Yes Yes   Sig: Take 25 mg by mouth daily   LEVOTHYROXINE SODIUM PO 5/30/2019 at Unknown time  Yes Yes   Sig: Take 112 mcg by mouth daily   Multiple Vitamins-Minerals (THERAPEUTIC M) TABS 5/30/2019 at Unknown time  Yes Yes   Sig: Take 1 tablet by mouth daily   Nitroglycerin (NITROSTAT SL) Unknown at Unknown time  Yes No   Sig: Place 0.4 mg under the tongue every 5 minutes as needed for chest pain   Omega-3 Fatty Acids (OMEGA-3 FISH OIL PO) 5/30/2019 at Unknown time  Yes Yes   Sig: Take 2 g by mouth daily   Warfarin Sodium (COUMADIN PO) 5/30/2019 at Unknown time  Yes Yes   Sig: As directed.      Facility-Administered Medications: None     Allergies   Allergies   Allergen Reactions     Crestor [Rosuvastatin]        Social History   I have reviewed this patient's social history and updated it with pertinent information if  needed. Ag Perez  reports that he has quit smoking. He has never used smokeless tobacco.    Family History   I have reviewed this patient's family history and updated it with pertinent information if needed.   No family history on file.    Review of Systems   As per HPI all others negative    Physical Exam   Temp: 97.8  F (36.6  C) Temp src: Tympanic BP: 162/79 Pulse: 97 Heart Rate: 71 Resp: 18 SpO2: 93 % O2 Device: None (Room air)    Vital Signs with Ranges  Temp:  [97.8  F (36.6  C)] 97.8  F (36.6  C)  Pulse:  [97] 97  Heart Rate:  [71-91] 71  Resp:  [18] 18  BP: (151-162)/(79-80) 162/79  SpO2:  [92 %-93 %] 93 %  235 lbs 0 oz    Constitutional: awake, alert, cooperative, mild distress from abdominal discomfort, and appears stated age  ENT: normocepalic, without obvious abnormality  Respiratory: No increased work of breathing, good air exchange, clear to auscultation bilaterally, no crackles or wheezing  Cardiovascular: regular rate and rhythm and normal S1 and S2  GI: obese, soft, tender to palpation along upper midline incision, no rebound or guarding, the upper midline incision hernia appears to reduce with gentle pressure, no diffuse tenderness or pain out of proportion on exam  Skin: no bruising or bleeding and normal skin color, texture, turgor  Musculoskeletal: pitting edema to bilateral lower extremities  Neurologic: Awake, alert, oriented to name, place and time.      Data   Results for orders placed or performed during the hospital encounter of 05/30/19 (from the past 24 hour(s))   Lactic acid whole blood   Result Value Ref Range    Lactic Acid 2.0 0.7 - 2.0 mmol/L   CBC with platelets differential   Result Value Ref Range    WBC 11.9 (H) 4.0 - 11.0 10e9/L    RBC Count 4.73 4.4 - 5.9 10e12/L    Hemoglobin 14.4 13.3 - 17.7 g/dL    Hematocrit 42.2 40.0 - 53.0 %    MCV 89 78 - 100 fl    MCH 30.4 26.5 - 33.0 pg    MCHC 34.1 31.5 - 36.5 g/dL    RDW 16.1 (H) 10.0 - 15.0 %    Platelet Count 256 150 - 450  10e9/L    Diff Method Automated Method     % Neutrophils 78.8 %    % Lymphocytes 14.4 %    % Monocytes 5.7 %    % Eosinophils 0.5 %    % Basophils 0.3 %    % Immature Granulocytes 0.3 %    Nucleated RBCs 0 0 /100    Absolute Neutrophil 9.3 (H) 1.6 - 8.3 10e9/L    Absolute Lymphocytes 1.7 0.8 - 5.3 10e9/L    Absolute Monocytes 0.7 0.0 - 1.3 10e9/L    Absolute Eosinophils 0.1 0.0 - 0.7 10e9/L    Absolute Basophils 0.0 0.0 - 0.2 10e9/L    Abs Immature Granulocytes 0.0 0 - 0.4 10e9/L    Absolute Nucleated RBC 0.0    Basic metabolic panel   Result Value Ref Range    Sodium 140 133 - 144 mmol/L    Potassium 3.7 3.4 - 5.3 mmol/L    Chloride 104 94 - 109 mmol/L    Carbon Dioxide 25 20 - 32 mmol/L    Anion Gap 11 3 - 14 mmol/L    Glucose 170 (H) 70 - 99 mg/dL    Urea Nitrogen 26 7 - 30 mg/dL    Creatinine 1.09 0.66 - 1.25 mg/dL    GFR Estimate 63 >60 mL/min/[1.73_m2]    GFR Estimate If Black 73 >60 mL/min/[1.73_m2]    Calcium 8.9 8.5 - 10.1 mg/dL   INR   Result Value Ref Range    INR 1.05 0.80 - 1.20

## 2019-05-31 NOTE — PROGRESS NOTES
[facility]    Hospitalist Progress Note    Date of Service (when I saw the patient): 05/31/2019    Assessment & Plan        Small bowel perforation (H): Presented with abdominal pain. This morning demonstrated acute abdomen, on assessment. Dr. Vilchis was notified and he took him to OR urgently. Please see operative note.       Mechanical ventilation: Vent post-op due to severe sepsis with respiratory compromise, open abdomen and plan to return to OR in 24-48 hours for second look. First ABG on arrival to floor shows mixed acidosis-now correcting with increase TV from 500 to 600. Minimal secretions noted with suctioning. No known underlying chronic lung disease. CXR shows ETT in satisfactory position. Ventilator management done in collaboration with Dr. Quintanilla at bedside.       Metabolic and respiratory acidosis: Initial vent settings , rate 12 (breathing 16-20) and PEEP 5. Increased tidal volume to 550 then 600. ABG rechecked shows acidosis with improved pH and PCO2, anion gap 11.       Severe Sepsis (H): Due to acute peritonitis from small bowel perforation. Toxic appearing on first appearance with severe pain. Given 3 liter fluid bolus, started on IV Zosyn. Lactic acid was 4. To OR urgently for source control. IN pre-op patient developed hypoxia, worsened tachypnea, hypotension. He was started on neosynephrine gtt in the OR.  He continues to require processors, switched over to levophed. Post-op repeat lactic acid on arrival was >6-however that sample was hemolyzed.  Recheck with blood from central line was 4.5 Will recheck in 3 hours.     AILEEN: Due to severe sepsis with end organ damage. Urine output decent through case but started dropping off post-operatively. Creatinine bumped from 1.08 to 1.29. Will recheck at 6 pm. Though MAP>65 on pressors, likely creatinine will continue to worsen initially. May be intervascularly dry with 3rd spacing fluid to abdomen and require further fluid  boluses. Hear rate has steadily improved since arrival on the floor so will watch closely until next lab draw.       Chronic atrial fibrillation (H): Has a pacemaker, has been on Xarelto in the past but was taken off of it due to GI bleeding.       Malignant neoplasm of transverse colon (H): S/P resection 2 months ago at [facility]. He did not have any immediate complications during that hospital stay. He is following-up with oncology.       Cardiac pacemaker in situ      Greater then 45 minutes spent in critical care time evaluating and treating acute severe sepsis and acute abdomen prior to OR and post-operatively.       DVT Prophylaxis: Enoxaparin (Lovenox) subcutaneous-will start Lovenox tomorrow as had epidural, SCD's now  Code Status: DNR    Disposition: Expected discharge in several days once stablized.    Kenisha Rolle CNP    Interval History   Notified by nurse patient having severe pain. On arrival to room patient was at edge of bed in crouched position. Once he received some pain medication we were able to lay him back. He has abdominal distention, severe abdominal pain, bruising noted around old incision site.     -Data reviewed today: I reviewed all new labs and imaging results over the last 24 hours.     Physical Exam   Temp: 100.6  F (38.1  C) Temp src: Tympanic BP: 155/80 Pulse: 70 Heart Rate: 104 Resp: (!) 32 SpO2: 96 % O2 Device: Mechanical Ventilator Oxygen Delivery: 4 LPM  Vitals:    05/30/19 2314 05/31/19 0523   Weight: 106.6 kg (235 lb) 102.5 kg (225 lb 15.5 oz)     Vital Signs with Ranges  Temp:  [97.3  F (36.3  C)-100.6  F (38.1  C)] 100.6  F (38.1  C)  Pulse:  [70-97] 70  Heart Rate:  [] 104  Resp:  [18-32] 32  BP: (144-162)/(66-80) 155/80  FiO2 (%):  [40 %-50 %] 50 %  SpO2:  [89 %-96 %] 96 %  I/O last 3 completed shifts:  In: 3142 [I.V.:1498; IV Piggyback:1644]  Out: 395 [Urine:380; Blood:15]    Peripheral IV 07/24/15 Right Hand (Active)   Number of days: 1407        Peripheral IV 05/30/19 Left Lower forearm (Active)   Site Assessment WDL 5/31/2019 10:17 AM   Line Status Infusing 5/31/2019 10:17 AM   Phlebitis Scale 0-->no symptoms 5/31/2019 10:17 AM   Infiltration Scale 0 5/31/2019 10:17 AM   Number of days: 1       Peripheral IV 05/31/19 Right Hand (Active)   Site Assessment [facility] 5/31/2019 10:17 AM   Line Status Infusing 5/31/2019 10:17 AM   Phlebitis Scale 0-->no symptoms 5/31/2019 10:17 AM   Infiltration Scale 0 5/31/2019 10:17 AM   Number of days: 0       CVC Triple Lumen 05/31/19 Right (Active)   Dressing Intervention Chlorhexidine sponge;Transparent;Other (Comment) 5/30/2019 12:00 AM   Number of days: 0       Intrathecal/Epidural Catheter 05/31/19 (Active)   Number of days: 0       ETT (adult) 8 (Active)   Secured By Commercial tube salazar 5/31/2019  1:20 PM   Secured at (cm) to lip 24 cm 5/31/2019  1:20 PM   Site of ET Tube Securement At lip 5/31/2019  1:20 PM   Number of days: 0       Open Drain Medial Abdomen 32 Frisian CASSETTE DRAPE CUT TO SIZE BY MD, 2 TOWELS, 2 CHEST TUBES WITH Y CONNECTOR ATTACHED TO FLORES BAG TO DRAIN (Active)   Number of days: 0       NG/OG Tube Nasogastric 16 fr Left mouth (Active)   Number of days: 0       Urethral Catheter Coude 16 fr (Active)   Catheter Care Done 5/31/2019  9:10 AM   Collection Container Standard 5/31/2019  9:10 AM   Securement Method Securing device (Describe) 5/31/2019 10:40 AM   Urine Output 175 mL 5/31/2019 10:40 AM   Number of days: 0       Incision/Surgical Site 05/31/19 Abdomen (Active)   Number of days: 0     Line/device assessment(s) completed for medical necessity    Constitutional: Awake,alert, acute painful distress.  Respiratory: Only able to take short shallow breaths due abdominal pain and distention. Tachypneic though not hypoxic.  Cardiovascular: HRR with mild tachycardia. No peripheral edema. Cap refill is sluggish though extremities pink and warm.   GI: Distended. Very tender to touch with rebound  tenderness. Central abdominal incision has melon sized areas of bruising worsen along the distal end. The incision itself is intact and well healed.   Skin/Integumen: Pale, no rashes or open areas, bruising to abdomen as above.       Medications     fentaNYL (SUBLIMAZE) 2500 mcg in 100 mL infusion 25 mcg/hr (05/31/19 1610)     lactated ringers 150 mL/hr at 05/31/19 1521     - MEDICATION INSTRUCTIONS -       norepinephrine 0.03 mcg/kg/min (05/31/19 1521)     propofol (DIPRIVAN) infusion 30 mcg/kg/min (05/31/19 1320)       chlorhexidine  15 mL Mouth/Throat Q12H     [START ON 6/1/2019] enoxaparin  40 mg Subcutaneous Q24H     pantoprazole (PROTONIX) IV  40 mg Intravenous BID     piperacillin-tazobactam  4.5 g Intravenous Q6H     sodium chloride (PF)  3 mL Intracatheter Q8H       Data   Recent Labs   Lab 05/31/19  1507 05/31/19  1350 05/31/19  0742 05/30/19  2315   WBC 5.4  --  11.9* 11.9*   HGB 15.5  --  16.4 14.4   MCV 93  --  89 89     --  261 256   INR  --   --  1.16 1.05   NA  --  141 140 140   POTASSIUM  --  4.0 3.9 3.7   CHLORIDE  --  109 107 104   CO2  --  20 21 25   BUN  --  28 28 26   CR  --  1.29* 1.08 1.09   ANIONGAP  --  12 12 11   ERIKA  --  7.6* 8.4* 8.9   GLC  --  205* 229* 170*       Recent Results (from the past 24 hour(s))   CT Abdomen Pelvis w/o Contrast    Narrative    PROCEDURE: CT ABDOMEN PELVIS W/O CONTRAST 5/31/2019 12:22 AM    HISTORY: Abd distension    COMPARISONS: 3/22/2012.    Meds/Dose Given: None.    TECHNIQUE: Axial noncontrast enhanced images with coronal and sagittal  reformatted images.    FINDINGS: Lung bases are relatively clear. Coronary artery  calcification is seen.    No focal abnormality is seen in the liver, spleen, adrenal glands or  pancreas. No renal or ureteral stone is seen. There is no  hydronephrosis.    There is a 1.9 cm low-attenuation lesion in the left kidney.    There is an anterior abdominal wall hernia with diastases of the  rectus muscle. This contains fluid  in the very small amount of gas  along the superior margin. Small bowel loop does protrude into the  posterior aspect of this. There is a transition point with mild  dilatation of small bowel loops proximal to this and more decompressed  small bowel loops distal to this.    No other bowel abnormality is seen. There is no pelvic mass or fluid  collection.    There is no aneurysm of the abdominal aorta but there is  atherosclerotic calcification.    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    No enlarged lymph nodes are seen.         Impression    IMPRESSION:   1. Anterior abdominal wall hernia with surrounding fluid. Small bowel  protrudes into this and there is a transition point associated with  this consistent with obstruction.  2. Inflammatory changes in the anterior abdominal wall fat adjacent to  this collection with a few bubbles of subcutaneous gas as well.    Findings are concordant with the original virtual radiology result.    CALE MCKEON MD   XR Chest 1 View    Narrative    PROCEDURE:  XR CHEST 1 VW    HISTORY:  pre-op.  Due to increasing dyspnea.     COMPARISON:  None.    FINDINGS:   The cardiac silhouette is normal in size. The pulmonary vasculature is  normal.  There is a poor inspiratory result with increased density  both lung bases most likely atelectasis. Is a transvenous pacemaker in  place. No pleural effusion or pneumothorax.      Impression    IMPRESSION:  Poor inspiratory result with bibasilar atelectatic  changes      EMMANUEL BILLY MD   XR Chest Port 1 View    Narrative    PROCEDURE:  XR CHEST PORT 1 VW    HISTORY:  ET placement AND central line placement.     COMPARISON:  None.    FINDINGS:   The cardiac silhouette is normal in size. There is a transvenous  pacemaker in place. The pulmonary vasculature is normal.  There is an  tracheal tube is tip approximately 3.5 cm above the kassandra. There is a  central catheter  with its tip in the right atrium. No pleural effusion  or pneumothorax.      Impression    IMPRESSION:  Endotracheal tube with its tip approximately 3.5 cm above  the kassandra      EMMANUEL BILLY MD

## 2019-05-31 NOTE — PROVIDER NOTIFICATION
DATE:  5/31/2019   TIME OF RECEIPT FROM LAB: 4928  LAB TEST:  Procalcitonin   LAB VALUE:  15.54  RESULTS GIVEN WITH READ-BACK TO (PROVIDER):  Kenisha Rolle, NP  TIME LAB VALUE REPORTED TO PROVIDER:  8577  PARISA Borges notified at [phone]

## 2019-05-31 NOTE — H&P
[facility]    History and Physical  Hospitalist       Date of Admission:  5/30/2019    Assessment & Plan   Ag Perez is a 79 year old male with history of hypertension, atrial fibrillation on anticoagulation who underwent left transverse hemicolectomy 03/12/2019 demonstrating invasive moderate to focally poorly differentiated colonic adenocarcinoma, refused to get chemotherapy presented to the ED today with acute onset of lower abdominal pain.  Issues of the following:    Small bowel obstruction: He is status post transverse hemicolectomy on 3/12 /2019 at [facility]. Could be due to incisional hernia obstruction.   The hernia itself is reducible.  Partial small bowel obstruction is also possibility but the patient is not passing gas at this point.  Dr. Vilchis's input appreciated.  At this point we will keep him strictly n.p.o. if he starts vomiting then we will place an NG tube.  Management will be conservative at this point.  He might need Gastrografin follow-through if he does not improve.  We will continue to follow surgery recommendations.      Paroxysmal atrial fibrillation: The patient is on anticoagulation with Coumadin.  I will hold that in case the patient needs a procedure.  INR is subtherapeutic anyway.    Hypothyroidism: Continue levothyroxine whenever the patient starts eating.  Check TSH levels.    History of colon cancer: The patient is status post hemicolectomy but refused to take chemotherapy.    Hypertension: Hold benazepril    Hyperlipidemia: Hold ezetimibe as the patient is strictly n.p.o.    DVT Prophylaxis: Enoxaparin (Lovenox) SQ  Code Status: DNR / DNI    Disposition: Expected discharge in 2-3 days once he starts tolerating his diet well.     Discussed with the patient.     Megan Miller    Primary Care Physician   Ary Montanez    Chief Complaint   Acute onset abdominal pain    History is obtained from the patient    History of Present Illness   Ag Perez is a 79 year  old male with history of hypertension, atrial fibrillation on anticoagulation who underwent left transverse hemicolectomy 03/12/2019 demonstrating invasive moderate to focally poorly differentiated colonic adenocarcinoma, refused to get chemotherapy presented to the ED today with acute onset of  abdominal pain.  He was going to the bathroom when he felt like a pop and developed abdominal pain.  It was a colicky pain 6-8 over 10 intensity.  No fevers or chills.  Did not have any nausea or vomiting.  His last bowel movement was yesterday and it was normal.  He is not passing gas.  At the time of history taking his pain has improved.  He denies nausea or vomiting.  No chest pain or shortness of breath.        Past Medical History    I have reviewed this patient's medical history and updated it with pertinent information if needed.   Past Medical History:   Diagnosis Date     Arrhythmia      Hypertension      Pacemaker    Hypertension  Hyperlipidemia  Atrial fibrillation on anticoagulation  Colon cancer status post transverse hemicolectomy on 3/12/2019  Hypothyroidism    Past Surgical History   I have reviewed this patient's surgical history and updated it with pertinent information if needed.  Past Surgical History:   Procedure Laterality Date     CARDIAC SURGERY       COLONOSCOPY       COLONOSCOPY N/A 7/24/2015    Procedure: COLONOSCOPY;  Surgeon: Kameron De Santiago MD;  Location: [facility] OR   Transverse hemicolectomy 03/12/2019 demonstrating invasive moderate to focally poorly differentiated colonic adenocarcinoma            Prior to Admission Medications   Prior to Admission Medications   Prescriptions Last Dose Informant Patient Reported? Taking?   ASPIRIN PO 5/30/2019 at Unknown time  Yes Yes   Sig: Take 81 mg by mouth daily   BENAZEPRIL HCL PO 5/30/2019 at Unknown time  Yes Yes   Sig: Take 40 mg by mouth daily   Ezetimibe (ZETIA PO) 5/30/2019 at Unknown time  Yes Yes   Sig: Take 10 mg by mouth At Bedtime    HYDROCHLOROTHIAZIDE PO 5/30/2019 at Unknown time  Yes Yes   Sig: Take 25 mg by mouth daily   LEVOTHYROXINE SODIUM PO 5/30/2019 at Unknown time  Yes Yes   Sig: Take 112 mcg by mouth daily   Multiple Vitamins-Minerals (THERAPEUTIC M) TABS 5/30/2019 at Unknown time  Yes Yes   Sig: Take 1 tablet by mouth daily   Nitroglycerin (NITROSTAT SL) Unknown at Unknown time  Yes No   Sig: Place 0.4 mg under the tongue every 5 minutes as needed for chest pain   Omega-3 Fatty Acids (OMEGA-3 FISH OIL PO) 5/30/2019 at Unknown time  Yes Yes   Sig: Take 2 g by mouth daily   Warfarin Sodium (COUMADIN PO) 5/30/2019 at Unknown time  Yes Yes   Sig: As directed.      Facility-Administered Medications: None     Allergies   Allergies   Allergen Reactions     Crestor [Rosuvastatin]        Social History   I have reviewed this patient's social history and updated it with pertinent information if needed. Henok Maki  reports that he has quit smoking. He has never used smokeless tobacco.    Family History   I have reviewed this patient's family history and updated it with pertinent information if needed.   No family history on file.    Review of Systems   GENERAL/CONSTITUTIONAL: The patient denies fever, weakness, weight gain or weight loss.  HEAD, EYES, EARS, NOSE AND THROAT: Eyes - The patient denies pain, redness, ,Ears, nose, mouth and throat. The patient denies ringing in the ears,  nosebleeds,   CARDIOVASCULAR: The patient denies chest pain, irregular heartbeats, palpitation, shortness of breath, orthopnea or PND  RESPIRATORY: The patient denies chronic dry cough, Hemoptysis,  wheezing or night sweats.  GASTROINTESTINAL: As per HPI.  The patient denies any hematemesis, hematochezia or melena  GENITOURINARY: The patient denies difficult urination, pain or burning with urination, blood in the urine,  MUSCULOSKELETAL: The patient denies muscle cramps. No joint or muscle pain. No muscle weakness or tenderness.  SKIN : The patient denies easy  bruising, skin redness, skin rash,   NEUROLOGIC: The patient denies headache, dizziness, fainting, seizures,   PSYCHIATRIC: The patient denies thoughts of suicide,  ENDOCRINE: The patient denies intolerance to hot or cold temperature,  HEMATOLOGIC/LYMPHATIC: The patient denies anemia, bleeding tendency or clotting tendency.    Physical Exam   Temp: 97.7  F (36.5  C) Temp src: Temporal BP: 162/79 Pulse: 70 Heart Rate: 75 Resp: 20 SpO2: (!) 91 % O2 Device: None (Room air)    Vital Signs with Ranges  Temp:  [97.3  F (36.3  C)-97.8  F (36.6  C)] 97.7  F (36.5  C)  Pulse:  [70-97] 70  Heart Rate:  [71-91] 75  Resp:  [18-20] 20  BP: (151-162)/(79-80) 162/79  SpO2:  [91 %-94 %] 91 %  235 lbs 0 oz  GENERAL APPEARANCE: The patient is awake alert oriented x3, in no acute distress.  HEENT: Normocephalic and atraumatic. No scleral icterus.PERRLA.  No oropharyngeal lesions.  NECK: Supple. Trachea is midline. No evidence of thyroid enlargement. No lymphadenopathy or tenderness.  CHEST: Symmetric. Nontender to palpation.  LUNGS: Breath sounds are equal and clear bilaterally. No wheezes, rhonchi, or rales.  HEART: Regular rate and rhythm with normal S1 and S2. No murmurs, gallops, or rubs.  ABDOMEN: Obese, soft, tender to palpation along upper midline incision, no rebound or guarding, the upper midline incision hernia is reducible. Bowel sounds are hypoactive.   RECTAL: Deferred  EXTREMITIES: No cyanosis, clubbing, or edema.  NEUROLOGIC: No focal sensory or motor deficits are noted. Cranial nerves II through XII are intact. Grossly nonfocal examination  PSYCHIATRIC: Appropriate mood and affect.  SKIN: Warm, dry, and well perfused. Good turgor. No lesions, nodules or rashes are noted.     Data   Data reviewed today:   Recent Labs   Lab 05/30/19  2315   WBC 11.9*   HGB 14.4   MCV 89      INR 1.05      POTASSIUM 3.7   CHLORIDE 104   CO2 25   BUN 26   CR 1.09   ANIONGAP 11   ERIKA 8.9   *       CT abdomen without  contrast: Ventral abdominal wall hernia containing fluid.  Bowel loops did not enter the hernia although there appears to be a transition point along its posterior aspect with mild proximal small bowel dilatation suspicious for obstruction.  Small amount of ascites.  Subcutaneous inflammatory changes around the hernia.  2 bubbles of subcutaneous gas along its superior aspect.  Total time spent in patient care was 55minutes. >  50% of time was spent in coordination of care and patient counseling.

## 2019-05-31 NOTE — PLAN OF CARE
Pt arrived to unit 1317 accompanied by Anesthesia & OR RN staff.     Initial 's systolic attempted to wean neosynepherine drip, unsuccessful, titrated up to 3mcg/kg/min w/improvement. BP's to 100's systolic.    HR irregular 100% AV paced rate .     Ventilated O2 sat adequate.     ABD dressed w/standard khalil bag to gravity drainage, no drainage present.

## 2019-05-31 NOTE — ED TRIAGE NOTES
"Pt states 2 months ago he had colon surgery \"and something happened today and it hurt terrible\".  Pt stated he started having pain before the BM \"and I wasn't able to finish it because the pain was so bad\".  "

## 2019-05-31 NOTE — PROGRESS NOTES
"[facility] General Surgery Progress Note         Assessment and Plan:    Assessment:   78 yo male s/p laparoscopic left hemicolectomy  Concern for incarcerated incisional hernia/dehiscense      Plan:   At this time we will proceed with exploratory laparotomy as the patient is having increase in abdominal pain and now his labs demonstrate an elevated lactic acid. An informed consent was obtained documenting the risks including but not limited to bleeding, infection, damage to surrounding structures, need for further operations.    Additional problems to be followed by medicine team           Interval History:   The patient states that he continues to have abdominal discomfort. He has not had any nausea or emesis.          Significant Problems:    Principal Problem:    SBO (small bowel obstruction) (H)            Review of Systems:    The patient denies any chest pain, shortness of breath, fever, chills, purulent drainage from the wound, nausea or vomiting.         Medications:     All medications related to the patient's surgery have been reviewed  Current Facility-Administered Medications   Medication     HYDROmorphone (PF) (DILAUDID) injection 0.3-0.5 mg     lactated ringers infusion     melatonin tablet 1 mg     naloxone (NARCAN) injection 0.1-0.4 mg     ondansetron (ZOFRAN-ODT) ODT tab 4 mg    Or     ondansetron (ZOFRAN) injection 4 mg     piperacillin-tazobactam (ZOSYN) intermittent infusion 4.5 g             Physical Exam:     Vitals were reviewed  Patient Vitals for the past 8 hrs:   BP Temp Temp src Pulse Heart Rate Resp SpO2 Height Weight   05/31/19 0930 154/72 100.6  F (38.1  C) Tympanic -- 93 (!) 32 96 % -- --   05/31/19 0900 -- -- -- -- -- -- 93 % -- --   05/31/19 0849 -- -- -- -- -- -- (!) 91 % -- --   05/31/19 0830 147/66 -- -- -- 71 -- -- -- --   05/31/19 0815 146/71 -- -- -- -- -- 93 % 1.651 m (5' 5\") --   05/31/19 0800 150/70 100.2  F (37.9  C) Tympanic -- 70 (!) 28 (!) 89 % -- -- "   05/31/19 0523 -- -- -- -- -- 26 -- -- 102.5 kg (225 lb 15.5 oz)   05/31/19 0338 -- 97.7  F (36.5  C) Temporal -- 75 20 (!) 91 % -- --   05/31/19 0335 162/79 97.3  F (36.3  C) Oral 70 -- 20 94 % -- --       General:  AAA, mild distress  Abdomen:  Obese, soft, tender to palpation along upper midline, mild ecchymosis to upper midline incision, no rebound    # Pain Assessment:  Current Pain Score 5/31/2019   Patient currently in pain? -   Pain score (0-10) 6   Pain location -   Pain descriptors -            Data:   All laboratory data related to this surgery reviewed  Results for orders placed or performed during the hospital encounter of 05/30/19 (from the past 24 hour(s))   Lactic acid whole blood   Result Value Ref [facility]    Lactic Acid 2.0 0.7 - 2.0 mmol/L   CBC with platelets differential   Result Value Ref [facility]    WBC 11.9 (H) 4.0 - 11.0 10e9/L    RBC Count 4.73 4.4 - 5.9 10e12/L    Hemoglobin 14.4 13.3 - 17.7 g/dL    Hematocrit 42.2 40.0 - 53.0 %    MCV 89 78 - 100 fl    MCH 30.4 26.5 - 33.0 pg    MCHC 34.1 31.5 - 36.5 g/dL    RDW 16.1 (H) 10.0 - 15.0 %    Platelet Count 256 150 - 450 10e9/L    Diff Method Automated Method     % Neutrophils 78.8 %    % Lymphocytes 14.4 %    % Monocytes 5.7 %    % Eosinophils 0.5 %    % Basophils 0.3 %    % Immature Granulocytes 0.3 %    Nucleated RBCs 0 0 /100    Absolute Neutrophil 9.3 (H) 1.6 - 8.3 10e9/L    Absolute Lymphocytes 1.7 0.8 - 5.3 10e9/L    Absolute Monocytes 0.7 0.0 - 1.3 10e9/L    Absolute Eosinophils 0.1 0.0 - 0.7 10e9/L    Absolute Basophils 0.0 0.0 - 0.2 10e9/L    Abs Immature Granulocytes 0.0 0 - 0.4 10e9/L    Absolute Nucleated RBC 0.0    Basic metabolic panel   Result Value Ref [facility]    Sodium 140 133 - 144 mmol/L    Potassium 3.7 3.4 - 5.3 mmol/L    Chloride 104 94 - 109 mmol/L    Carbon Dioxide 25 20 - 32 mmol/L    Anion Gap 11 3 - 14 mmol/L    Glucose 170 (H) 70 - 99 mg/dL    Urea Nitrogen 26 7 - 30 mg/dL    Creatinine 1.09 0.66 - 1.25 mg/dL    GFR  Estimate 63 >60 mL/min/[1.73_m2]    GFR Estimate If Black 73 >60 mL/min/[1.73_m2]    Calcium 8.9 8.5 - 10.1 mg/dL   INR   Result Value Ref [facility]    INR 1.05 0.80 - 1.20   NT pro BNP   Result Value Ref [facility]    N-Terminal Pro BNP Inpatient 73 0 - 1,800 pg/mL   CT Abdomen Pelvis w/o Contrast    Narrative    PROCEDURE: CT ABDOMEN PELVIS W/O CONTRAST 5/31/2019 12:22 AM    HISTORY: Abd distension    COMPARISONS: 3/22/2012.    Meds/Dose Given: None.    TECHNIQUE: Axial noncontrast enhanced images with coronal and sagittal  reformatted images.    FINDINGS: Lung bases are relatively clear. Coronary artery  calcification is seen.    No focal abnormality is seen in the liver, spleen, adrenal glands or  pancreas. No renal or ureteral stone is seen. There is no  hydronephrosis.    There is a 1.9 cm low-attenuation lesion in the left kidney.    There is an anterior abdominal wall hernia with diastases of the  rectus muscle. This contains fluid in the very small amount of gas  along the superior margin. Small bowel loop does protrude into the  posterior aspect of this. There is a transition point with mild  dilatation of small bowel loops proximal to this and more decompressed  small bowel loops distal to this.    No other bowel abnormality is seen. There is no pelvic mass or fluid  collection.    There is no aneurysm of the abdominal aorta but there is  atherosclerotic calcification.    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    No enlarged lymph nodes are seen.         Impression    IMPRESSION:   1. Anterior abdominal wall hernia with surrounding fluid. Small bowel  protrudes into this and there is a transition point associated with  this consistent with obstruction.  2. Inflammatory changes in the anterior abdominal wall fat adjacent to  this collection with a few bubbles of subcutaneous gas as well.    Findings are concordant with the original virtual  radiology result.    CALE MCKEON MD   Active MRSA Surveillance Culture   Result Value Ref [facility]    Specimen Description Swab     Culture Micro Culture in progress    Basic metabolic panel   Result Value Ref [facility]    Sodium 140 133 - 144 mmol/L    Potassium 3.9 3.4 - 5.3 mmol/L    Chloride 107 94 - 109 mmol/L    Carbon Dioxide 21 20 - 32 mmol/L    Anion Gap 12 3 - 14 mmol/L    Glucose 229 (H) 70 - 99 mg/dL    Urea Nitrogen 28 7 - 30 mg/dL    Creatinine 1.08 0.66 - 1.25 mg/dL    GFR Estimate 64 >60 mL/min/[1.73_m2]    GFR Estimate If Black 74 >60 mL/min/[1.73_m2]    Calcium 8.4 (L) 8.5 - 10.1 mg/dL   CBC with platelets differential   Result Value Ref [facility]    WBC 11.9 (H) 4.0 - 11.0 10e9/L    RBC Count 5.40 4.4 - 5.9 10e12/L    Hemoglobin 16.4 13.3 - 17.7 g/dL    Hematocrit 48.2 40.0 - 53.0 %    MCV 89 78 - 100 fl    MCH 30.4 26.5 - 33.0 pg    MCHC 34.0 31.5 - 36.5 g/dL    RDW 16.4 (H) 10.0 - 15.0 %    Platelet Count 261 150 - 450 10e9/L    Diff Method Automated Method     % Neutrophils 85.8 %    % Lymphocytes 6.1 %    % Monocytes 7.6 %    % Eosinophils 0.0 %    % Basophils 0.3 %    % Immature Granulocytes 0.2 %    Nucleated RBCs 0 0 /100    Absolute Neutrophil 10.2 (H) 1.6 - 8.3 10e9/L    Absolute Lymphocytes 0.7 (L) 0.8 - 5.3 10e9/L    Absolute Monocytes 0.9 0.0 - 1.3 10e9/L    Absolute Eosinophils 0.0 0.0 - 0.7 10e9/L    Absolute Basophils 0.0 0.0 - 0.2 10e9/L    Abs Immature Granulocytes 0.0 0 - 0.4 10e9/L    Absolute Nucleated RBC 0.0    INR   Result Value Ref [facility]    INR 1.16 0.80 - 1.20   TSH with free T4 reflex   Result Value Ref [facility]    TSH 0.25 (L) 0.40 - 4.00 mU/L   T4 free   Result Value Ref [facility]    T4 Free 1.25 0.76 - 1.46 ng/dL   Lactic acid whole blood   Result Value Ref [facility]    Lactic Acid 4.6 (HH) 0.7 - 2.0 mmol/L   UA with Microscopic reflex to Culture   Result Value Ref [facility]    Color Urine Yellow     Appearance Urine Clear     Glucose Urine Negative NEG^Negative mg/dL    Bilirubin Urine  Negative NEG^Negative    Ketones Urine Negative NEG^Negative mg/dL    Specific Gravity Urine 1.025 1.003 - 1.035    Blood Urine Small (A) NEG^Negative    pH Urine 5.5 4.7 - 8.0 pH    Protein Albumin Urine 10 (A) NEG^Negative mg/dL    Urobilinogen mg/dL Normal 0.0 - 2.0 mg/dL    Nitrite Urine Negative NEG^Negative    Leukocyte Esterase Urine Negative NEG^Negative    Source Catheterized Urine     WBC Urine <1 0 - 5 /HPF    RBC Urine 6 (H) 0 - 2 /HPF    Bacteria Urine None (A) NEG^Negative /HPF    Mucous Urine Present (A) NEG^Negative /LPF         Pastor Vilchis MD

## 2019-05-31 NOTE — BRIEF OP NOTE
[facility]    Brief Operative Note    Pre-operative diagnosis: INCARCERATED INCISIONAL HERNIA  Post-operative diagnosis Fascial dehiscense with small bowel perforation  Procedure: Procedure(s):  EXPLORATORY LAPAROTOMY WITH RESECTION OF SMALL BOWEL, JEJUNUM, TEMPORARY ABDOMINAL WALL CLOSURE, SEROSAL REPAIR  Surgeon: Surgeon(s) and Role:     * Pastor Vilchis MD - Primary     * Jose Rodriges MD - Assisting  Anesthesia: General   Estimated blood loss: 15 ml  Drains: Open abdominal pack with Chest tubes in place  Specimens:   ID Type Source Tests Collected by Time Destination   A : small bowel Tissue Other SURGICAL PATHOLOGY EXAM Pastor Vilchis MD 5/31/2019 12:25 PM      Findings:   Upper midline fascial closure dehisced and resulted in small bowel perforation with enteric contamination. Two small serosal tears with mobilization that were repaired. Small bowel perforation resected and abdomen washed out. Temporary abdominal wall closure utilized.  Complications: Patient required pressors for blood pressure management during the operation.  Implants:  * No implants in log *

## 2019-05-31 NOTE — ADDENDUM NOTE
Addendum  created 05/31/19 1727 by Cici Moreno APRN CRNA    Intraprocedure Meds edited, Order list changed, Order sets accessed

## 2019-05-31 NOTE — PROVIDER NOTIFICATION
DATE:  5/31/2019   TIME OF RECEIPT FROM LAB:  1400  LAB TEST:  Lactic acid  LAB VALUE:  6.0  RESULTS GIVEN WITH READ-BACK TO Kenisha Rolle  TIME LAB VALUE REPORTED TO PROVIDER:   1400

## 2019-05-31 NOTE — PROVIDER NOTIFICATION
DATE:  5/31/2019   TIME OF RECEIPT FROM LAB:  606  LAB TEST:  Lactic   LAB VALUE:  4.6  RESULTS GIVEN WITH READ-BACK TO (PROVIDER):  Dr Miller via Txt page  TIME LAB VALUE REPORTED TO PROVIDER:   608

## 2019-05-31 NOTE — PLAN OF CARE
Dr Miller retracted CVC 5 notches from skin. Repeat chest Xray to confirm placement, Dr Miller confirmed OK to use CVC.

## 2019-05-31 NOTE — PROVIDER NOTIFICATION
DATE:  5/31/2019    TIME OF RECEIPT FROM LAB:  0838  LAB TEST:  Lactic Acid   LAB VALUE:  4.7  RESULTS GIVEN WITH READ-BACK TO (PROVIDER):  Kenisha Rolle, NP  TIME LAB VALUE REPORTED TO PROVIDER:   0843, no new orders received. PARISA Feldman notified at 0840

## 2019-05-31 NOTE — ED PROVIDER NOTES
History     Chief Complaint   Patient presents with     Abdominal Pain     HPI  Ag Perez is a 79 year old male who presented to the ER with a chief complaint of abdominal pain to distention.  Past medical history significant for colon cancer status post resection at [facility] approximately 2 months prior.  States that the pain occurred this evening while getting up.  He rates it 8 out of 10.  He states he has not been passing gas.  He endorses nausea without vomiting.  Patient is unsure of any alleviating factors.    Allergies:  Allergies   Allergen Reactions     Crestor [Rosuvastatin]        Problem List:    Patient Active Problem List    Diagnosis Date Noted     SBO (small bowel obstruction) (H) 05/31/2019     Priority: Medium     Advance care planning 08/11/2016     Priority: Medium     Advance Care Planning 8/11/2016: Receipt of ACP document:  Received: Resuscitation Guidelines order which was signed and dated by provider on 6/2/16.  Document previously scanned on 6/3/16.  Order reviewed and found to be valid.  Code Status needs to be updated to reflect choices in most recent ACP document: DNR. Confirmed/documented designated decision maker(s).  Added by Emely Flores   Advance Care Planning Liaison                  Past Medical History:    Past Medical History:   Diagnosis Date     Arrhythmia      Hypertension      Pacemaker        Past Surgical History:    Past Surgical History:   Procedure Laterality Date     CARDIAC SURGERY       COLONOSCOPY       COLONOSCOPY N/A 7/24/2015    Procedure: COLONOSCOPY;  Surgeon: Kameron De Santiago MD;  Location: [facility] OR       Family History:    No family history on file.    Social History:  Marital Status:   [2]  Social History     Tobacco Use     Smoking status: Former Smoker     Smokeless tobacco: Never Used   Substance Use Topics     Alcohol use: Not on file     Drug use: Not on file        Medications:      ASPIRIN PO   BENAZEPRIL HCL PO   Ezetimibe (ZETIA  PO)   HYDROCHLOROTHIAZIDE PO   LEVOTHYROXINE SODIUM PO   Multiple Vitamins-Minerals (THERAPEUTIC M) TABS   Omega-3 Fatty Acids (OMEGA-3 FISH OIL PO)   Warfarin Sodium (COUMADIN PO)   Nitroglycerin (NITROSTAT SL)         Review of Systems   Respiratory: Negative for chest tightness.    Gastrointestinal: Positive for abdominal distention, abdominal pain and nausea. Negative for vomiting.   Musculoskeletal: Negative for back pain.   All other systems reviewed and are negative.      Physical Exam   BP: 151/80  Pulse: 97  Heart Rate: 91  Temp: 97.8  F (36.6  C)  Resp: 18  Weight: 106.6 kg (235 lb)  SpO2: 93 %      Physical Exam   Constitutional: He appears well-developed.   HENT:   Head: Normocephalic.   Eyes: Pupils are equal, round, and reactive to light.   Neck: Normal range of motion.   Cardiovascular: Normal rate.   Pulmonary/Chest: Effort normal.   Abdominal: He exhibits distension. There is tenderness. There is no guarding.   Postoperative scar is healing well   Musculoskeletal: Normal range of motion.   Neurological: He is alert.       ED Course     ED Course as of May 31 0259   Fri May 31, 2019   0131 Surgery reviewing films        Procedures                   Results for orders placed or performed during the hospital encounter of 05/30/19 (from the past 24 hour(s))   Lactic acid whole blood   Result Value Ref Range    Lactic Acid 2.0 0.7 - 2.0 mmol/L   CBC with platelets differential   Result Value Ref Range    WBC 11.9 (H) 4.0 - 11.0 10e9/L    RBC Count 4.73 4.4 - 5.9 10e12/L    Hemoglobin 14.4 13.3 - 17.7 g/dL    Hematocrit 42.2 40.0 - 53.0 %    MCV 89 78 - 100 fl    MCH 30.4 26.5 - 33.0 pg    MCHC 34.1 31.5 - 36.5 g/dL    RDW 16.1 (H) 10.0 - 15.0 %    Platelet Count 256 150 - 450 10e9/L    Diff Method Automated Method     % Neutrophils 78.8 %    % Lymphocytes 14.4 %    % Monocytes 5.7 %    % Eosinophils 0.5 %    % Basophils 0.3 %    % Immature Granulocytes 0.3 %    Nucleated RBCs 0 0 /100    Absolute  Neutrophil 9.3 (H) 1.6 - 8.3 10e9/L    Absolute Lymphocytes 1.7 0.8 - 5.3 10e9/L    Absolute Monocytes 0.7 0.0 - 1.3 10e9/L    Absolute Eosinophils 0.1 0.0 - 0.7 10e9/L    Absolute Basophils 0.0 0.0 - 0.2 10e9/L    Abs Immature Granulocytes 0.0 0 - 0.4 10e9/L    Absolute Nucleated RBC 0.0    Basic metabolic panel   Result Value Ref Range    Sodium 140 133 - 144 mmol/L    Potassium 3.7 3.4 - 5.3 mmol/L    Chloride 104 94 - 109 mmol/L    Carbon Dioxide 25 20 - 32 mmol/L    Anion Gap 11 3 - 14 mmol/L    Glucose 170 (H) 70 - 99 mg/dL    Urea Nitrogen 26 7 - 30 mg/dL    Creatinine 1.09 0.66 - 1.25 mg/dL    GFR Estimate 63 >60 mL/min/[1.73_m2]    GFR Estimate If Black 73 >60 mL/min/[1.73_m2]    Calcium 8.9 8.5 - 10.1 mg/dL   INR   Result Value Ref Range    INR 1.05 0.80 - 1.20       Medications   HYDROmorphone (DILAUDID) injection 1 mg (1 mg Intravenous Not Given 5/31/19 0124)   0.9% sodium chloride BOLUS (0 mLs Intravenous Stopped 5/31/19 0120)   ondansetron (ZOFRAN) injection 4 mg (4 mg Intravenous Given 5/30/19 2327)   HYDROmorphone (PF) (DILAUDID) injection 0.5 mg (1 mg Intravenous Given 5/30/19 2914)       Assessments & Plan (with Medical Decision Making)     I have reviewed the nursing notes.    I have reviewed the findings, diagnosis, plan and need for follow up with the patient.         Medication List      There are no discharge medications for this visit.         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  Surgery was performed in March of this year.  He presents to the ER with a chief complaint of abdominal pain and distention.  During his time in the ER an episode of acute pain which necessitated intervention with 1 mg of Dilaudid.  Differential diagnosis is concerning for small bowel obstruction versus a strangulated or incarcerated hernia.  Labs are grossly within normal limits lactate  is not elevated INR is 1.05 BMP without acute abnormalities.  CT scan is suspicious for a small bowel obstruction.  I did speak with on-call surgery who was gracious enough to come to the ER to evaluate patient at bedside.  He recommended further monitoring no intervention at this time specifically no NG tube in this patient is experiencing intractable vomiting.  Plan at this time is to admit to the medicine service for further cares.      5/30/2019   [facility] EM[facility]     Ashu Shields MD  05/31/19 8333

## 2019-05-31 NOTE — PLAN OF CARE
[facility] Inpatient Admission Note:    Patient admitted to 3226/3226-1 at approximately 0320 via wheel chair accompanied by son from emergency room . Report received from PARISA Villarreal in SBAR format at 0323 via telephone. Patient ambulated to bed via self.. Patient is alert and oriented X 3, reports pain; rates at 7 on 0-10 scale.  Patient oriented to room, unit, hourly rounding, and plan of care. Explained admission packet and patient handbook with patient bill of rights brochure. Will continue to monitor and document as needed.     Inpatient Nursing criteria listed below was met:    Health care directives status obtained and documented: Yes    Care Everywhere authorization obtained No    MRSA swab completed for patient 65 years and older: Yes    Patient identifies a surrogate decision maker: Yes If yes, who:Son Pat and Daughter-in-Law B Contact Information:[phone]    Core Measure diagnosis present:No.      If initial lactic acid >2.0, repeat lactic acid drawn within one hour of arrival to unit: NA. If no, state reason: not >2    Vaccination assessment and education completed: Yes   Vaccinations received prior to admission: Pneumovax yes  Influenza(seasonal)  YES   Vaccination(s) ordered: NA    Clergy visit ordered if patient requests: No    Skin issues/needs documented: Yes and N/A    Isolation Patient: no Education given, correct sign in place and documentation row added to PCS:  NA    Fall Prevention Yes: Care plan updated, education given and documented, sticker and magnet in place: Yes    Care Plan initiated: Yes    Education Documented (including assessment): Yes    Patient has discharge needs : Yes If yes, please explain: consult; lives @ home alone; son and daughter in law help

## 2019-05-31 NOTE — PROGRESS NOTES
Assessment completed see flow sheet.    LOC: alert   Others present: Family, pt is intubated    Dx: SBO  Chronic Disease Management: HTN    Lives with: alone with his cat  Living at:  [place] [facility]  Transportation: YES     Primary PCP: Ary Montanez  Insurance:  Medicare/[employer]  Medicare: Inpatient letter reviewed with mike Torres.    Support System:  Kids and friends  Homecare/PCA: no  /County Services:   no  : YES      [facility] Referral line called: yes    Health Care Directive: YES. On file  Guardian: no  POA: son states he is POA    Pharmacy: [facility]  Meds management: YES, manages own    Adequate Resources for needs (housing, utilities, food/med): YES  Household chores: does own, family states home is immaculate  Work/community/social activity: YES gets out many times a week socially    ADLs: independent  Ambulation:independent  Falls: no  Nutrition: no concern  Sleep: no concern    Equipment used: none      Oxygen supplier: n/a      Does the supplier have valid oxygen orders: n/a    Mental health: no  Substance abuse: no  Exposure to violence/abuse: no  Stressors: denies    Able to Return to Prior Living Arrangements: unknown at this time    Choice of Vendor: n/a    Barriers: none    GENE: none    Plan: unknown, will see how hospital course goes, may need SNF for STR.     This assessment was completed with mike Torres and daughter in law Renetta. [place] live about [place] mile from [place]. Jan quit smoking 35+ years ago and quit drinking 50+ years ago. He has his own ricardo. He enjoys blacksmithing, reading the paper, watching documentaries and feeding the wildlife.     Unsure of discharge plan, will see how things go. CM remains available.

## 2019-05-31 NOTE — PLAN OF CARE
"At approx 0800- in to assess patient.  Patient at edge of bed, hunched over states having abdominal pain sharp upper right to across abdomen.  Taking shallow respiration approx.  28-32/min states it hurts too much to breath deeper.  Vitals as charted, febrile.  Tender distended abdomen, absent bowel sounds.  Updated hospitalist.      0815 New orders rec'd to give Dilaudid 1mg, Hospitalist in to see patient, new orders rec'd.  Hospitalist contacting Dr. Vilchis and updating on patient condition.   After dilaudid was given, drowsy and assisted back into bed comfortable. Able to visualize abdomen and noting a large bruise to mid abdomen.  Patient unable to report if this was present before.  Hospitalist also at bedside at this time.   Approx. 0830 discussed with patient khalil placement for closer monitoring and agrees to having Khalil placed as charted; educated on CAUTI.  Lab present in room and Labs are drawn at this time including lactic and blood cultures prior to starting IV Zosyn.  2nd IV started and bolus started see eMAR.   0845-Son Brain was called and asked to come to the hospital so is available to speak to doctors on patient's behalf.  Dr. Vilchis arrives to see patient and discusses plan to take to OR for exploratory lap. MD will update son of plan and review consent when he arrives to hospital.     Preop bed bath given with Hibiclens.     Patient was transferred to OR  at approximately 1005AM via bed .   Patient status drowsy, guarded. Patient's most recent vitals: Blood pressure 144/79, pulse 70, temperature 100.6  F (38.1  C), temperature source Tympanic, resp. rate (!) 32, height 1.651 m (5' 5\"), weight 102.5 kg (225 lb 15.5 oz), SpO2 92 %.  Pt transferred with 2 IV's in place  Intact IV site at Left AC and Right #20 hand   Pt transferred with oxygen. .Yes   O@2via nasal cannula at O2 4 LPM  Other LDA\"s: Khalil    Report given to Brent via phone- aware that patient is to receive total of 3 liters.  " 1st liter is completed volume was charted.  2nd liter is infusing(300 mls infused so far and is documented) and he will be hanging the 3rd liter after 2nd is finished. Zosyn completed and that IV was changed back over to maintenance rate of 100/hour LR. Aware that Dilaudid 0.5 mg given at 0940.

## 2019-05-31 NOTE — PLAN OF CARE
"Temp: 97.7  F (36.5  C) Temp src: Temporal BP: 162/79 Pulse: 70 Heart Rate: 75 Resp: 26 SpO2: (!) 91 % O2 Device: None (Room air)       Patient A&O x 4; pain 8-10/10 in generalized abdominal area - Dilaudid 0.5 x 2 given this shift. Patient Lungs CTAB - Bowel sounds are extremely hypoactive and patient reports finally passing \"just a little bit\" of gas. Breathing is shallow due to pain, but patient will do deep breathing and cough with encouragement. Using urinal @ side of bed. SBA x 1. Patient currently sitting at side of bed because of pain - call light in reach; bed alarm on.     Face to face report given with opportunity to observe patient.    Report given to PARISA Camacho   5/31/2019  7:51 AM    "

## 2019-05-31 NOTE — PROGRESS NOTES
Attempted to meet with patient or family member, neither available pt is down to surgery. CM remains available.

## 2019-05-31 NOTE — PROGRESS NOTES
Patient remains on ventilator with settings at:  Mode: A/C  RR: 12  VT: 600  FIO2: 50  PEEP: 5  PS: 0  ET tube secured at: 8 Size: 24  Cuff checked: Yes minimal seal  Breath sounds: clear  SpO2: 94%  Suction: scant amount of clear secretions  Weaning trial attempted: No   Ambu bag present: Yes at bedside

## 2019-05-31 NOTE — PROGRESS NOTES
Dr. Miller aware that CVP monitoring is not showing appropriate waveform. Will stop CVP monitoring and continue with arterial line.     Dr. Miller also visualized ABG results and is ok with ABG being drawn in the am at this time.

## 2019-05-31 NOTE — ANESTHESIA PREPROCEDURE EVALUATION
Anesthesia Pre-Procedure Evaluation    Patient: Ag Perez   MRN: [id number] : 1940          Preoperative Diagnosis: INCARCERATED INCISIONAL HERNIA    Procedure(s):  EXPLORATORY LAPAROTOMY    Past Medical History:   Diagnosis Date     Arrhythmia      Hypertension      Pacemaker      Past Surgical History:   Procedure Laterality Date     CARDIAC SURGERY       COLONOSCOPY       COLONOSCOPY N/A 2015    Procedure: COLONOSCOPY;  Surgeon: Kameron De Santiago MD;  Location: [facility] OR       Anesthesia Evaluation     .             ROS/MED HX    ENT/Pulmonary:     (+)KYRIE risk factors hypertension, obese, tobacco use, Past use , . .    Neurologic:  - neg neurologic ROS     Cardiovascular:     (+) hypertension----. Taking blood thinners : . . . pacemaker :. dysrhythmias a-fib, .       METS/Exercise Tolerance:     Hematologic:  - neg hematologic  ROS       Musculoskeletal:   (+) arthritis,  -       GI/Hepatic: Comment: Colon CA with hemicolectomy 3-12-    (+) Other GI/Hepatic       Renal/Genitourinary: Comment: History of bladder CA        Endo:     (+) Obesity, .      Psychiatric:  - neg psychiatric ROS       Infectious Disease:  - neg infectious disease ROS       Malignancy:   (+) Malignancy History of Other  Other CA status post Surgery         Other:    - neg other ROS                      Physical Exam      Airway   Mallampati: II  TM distance: <3 FB  Neck ROM: limited    Dental     Cardiovascular   Rhythm and rate: regular      Pulmonary (+) rhonchi and wheezes       Other findings: braething is labored and dyspneic and he is almost grunting when breathing.  SAO2 on 4L nc is 90-92%.  Getting pre-op CXR        Lab Results   Component Value Date    WBC 11.9 (H) 2019    HGB 16.4 2019    HCT 48.2 2019     2019     2019    POTASSIUM 3.9 2019    CHLORIDE 107 2019    CO2 21 2019    BUN 28 2019    CR 1.08 2019     (H) 2019    ERIKA 8.4  "(L) 05/31/2019    INR 1.16 05/31/2019    TSH 0.25 (L) 05/31/2019    T4 1.25 05/31/2019       Preop Vitals  BP Readings from Last 3 Encounters:   05/31/19 144/79   07/24/15 152/76    Pulse Readings from Last 3 Encounters:   05/31/19 70   07/24/15 68      Resp Readings from Last 3 Encounters:   05/31/19 (!) 32   07/24/15 16    SpO2 Readings from Last 3 Encounters:   05/31/19 92%   07/24/15 94%      Temp Readings from Last 1 Encounters:   05/31/19 100.6  F (38.1  C) (Tympanic)    Ht Readings from Last 1 Encounters:   05/31/19 1.651 m (5' 5\")      Wt Readings from Last 1 Encounters:   05/31/19 102.5 kg (225 lb 15.5 oz)    Estimated body mass index is 37.6 kg/m  as calculated from the following:    Height as of this encounter: 1.651 m (5' 5\").    Weight as of this encounter: 102.5 kg (225 lb 15.5 oz).       Anesthesia Plan      History & Physical Review  History and physical reviewed and following examination; no interval change.    ASA Status:  3 .    NPO Status:  > 8 hours    Plan for General and Epidural with Intravenous and Propofol induction. Maintenance will be Balanced.    PONV prophylaxis:  Ondansetron (or other 5HT-3) and Dexamethasone or Solumedrol       Postoperative Care  Postoperative pain management:  IV analgesics and Neuraxial analgesia.      Consents  Anesthetic plan, risks, benefits and alternatives discussed with:  Patient and Daughter/Son.  Use of blood products discussed: No .   .                 JIN Hutchins CRNA  "

## 2019-05-31 NOTE — PLAN OF CARE
Right wrist and Left wrist restraints initiated on patient on 5/31/2019 at 1515PM    Clinical Justification: Pulling lines, pulling tubes, and pulling equipment  Less Restrictive Alternative: Repositioning, Pain management  Attending Physician Notified: MD ordered restraint,     Order received: Yes     Family Notification: Other(Son & dtr in law)   Criteria explained to son and daughter, verbalized understanding  Patient's Response: No evidence of learning  Restraint care Plan initiated: Yes    Jony Bettencourt

## 2019-05-31 NOTE — PROGRESS NOTES
Patient returned from the OR intubated.  He had bowel perforation with peritonitis.  Septic state.  ABG postop does show pH 7.25 PCO2 49 PO2 85 and bicarb 21 on 50% FiO2.  Chest x-ray shows poor respiratory effort.  ET tube in appropriate position.  Adjusting vent settings increase tidal volume 600.  He is on 5 of PEEP.  Assist control with rate of 12.  Breathing above this at rates of 15.  Repeat ABG.  Continue with sedation propofol.

## 2019-05-31 NOTE — ANESTHESIA POSTPROCEDURE EVALUATION
Patient: Ag Perez    Procedure(s):  EXPLORATORY LAPAROTOMY WITH RESECTION OF SMALL BOWEL, JEJUNUM, TEMPORARY ABDOMINAL WALL CLOSURE, SEROSAL REPAIR    Diagnosis:INCARCERATED INCISIONAL HERNIA  Diagnosis Additional Information: No value filed.    Anesthesia Type:  General, Epidural    Note:  Anesthesia Post Evaluation    Patient location during evaluation: ICU  Patient participation: Unable to participate in evaluation secondary to underlying medical condition  Post-procedure mental status: on the vent and sedated.  Pain management: unable to assess  Airway patency: patent (on the vent)  Cardiovascular status: hemodynamically stable (on pressors )  Respiratory status: ventilator  Hydration status: stable  PONV: unable to assess             Last vitals:  Vitals:    05/31/19 1100 05/31/19 1320 05/31/19 1346   BP:      Pulse:      Resp:      Temp:      SpO2: (!) 91% 96% (!) 91%         Electronically Signed By: JIN Rebollar CRNA  May 31, 2019  2:00 PM

## 2019-05-31 NOTE — PROGRESS NOTES
Pt to room 3126. Placed on . A/C 12/ min, Vt 500 ml, Fio2 .40 Peep 5. BBS equal, EtCo2 35, Spo2 95% Suctioned scant amount of clear secrerions via ETT

## 2019-05-31 NOTE — PLAN OF CARE
"Pump infusing Fentanyl error reading \"air in line\" withdrew 9mLs to clear air from tubing and restore pump function. Wasted 9mL fentanyl w/Rosa LOWE RN.   "

## 2019-05-31 NOTE — ED NOTES
Patient states he got up about 4 hours ago to use the bathroom, thought he was going to have a bowel movement.  States while he was trying to have a bowel movement, he felt a pop in lower abdomen and developed pain. He is also distended. States he had colon surgery about 7 weeks ago for colon cancer.  States he has been doing good up until tonight. States last bowel movement was yesterday. Denies nausea at this time. Denies urinary symptoms.

## 2019-05-31 NOTE — ED TRIAGE NOTES
Pt presents today via ambulance with complaints of abdominal pain after having standing up at home, 1800 started. Called ambulance because pain got worse, no OTC medications were taken at home. Rn at bedside with call light in reach. Rates pain at 8/10. Denies nausea, no vomiting episodes.

## 2019-06-01 ENCOUNTER — APPOINTMENT (OUTPATIENT)
Dept: GENERAL RADIOLOGY | Facility: HOSPITAL | Age: 79
DRG: 907 | End: 2019-06-01
Attending: NURSE PRACTITIONER
Payer: MEDICARE

## 2019-06-01 LAB
ALBUMIN SERPL-MCNC: 2 G/DL (ref 3.4–5)
ALP SERPL-CCNC: 44 U/L (ref 40–150)
ALT SERPL W P-5'-P-CCNC: 14 U/L (ref 0–70)
ANION GAP SERPL CALCULATED.3IONS-SCNC: 10 MMOL/L (ref 3–14)
AST SERPL W P-5'-P-CCNC: 17 U/L (ref 0–45)
BACTERIA SPEC CULT: NORMAL
BASE DEFICIT BLDA-SCNC: 2 MMOL/L
BASOPHILS # BLD AUTO: 0 10E9/L (ref 0–0.2)
BASOPHILS NFR BLD AUTO: 0 %
BILIRUB SERPL-MCNC: 1.8 MG/DL (ref 0.2–1.3)
BUN SERPL-MCNC: 29 MG/DL (ref 7–30)
CALCIUM SERPL-MCNC: 7.8 MG/DL (ref 8.5–10.1)
CHLORIDE SERPL-SCNC: 108 MMOL/L (ref 94–109)
CO2 SERPL-SCNC: 23 MMOL/L (ref 20–32)
CREAT SERPL-MCNC: 1.19 MG/DL (ref 0.66–1.25)
DIFFERENTIAL METHOD BLD: ABNORMAL
EOSINOPHIL # BLD AUTO: 0 10E9/L (ref 0–0.7)
EOSINOPHIL NFR BLD AUTO: 0 %
ERYTHROCYTE [DISTWIDTH] IN BLOOD BY AUTOMATED COUNT: 16.9 % (ref 10–15)
EST. AVERAGE GLUCOSE BLD GHB EST-MCNC: 134 MG/DL
GFR SERPL CREATININE-BSD FRML MDRD: 57 ML/MIN/{1.73_M2}
GLUCOSE BLDC GLUCOMTR-MCNC: 151 MG/DL (ref 70–99)
GLUCOSE BLDC GLUCOMTR-MCNC: 154 MG/DL (ref 70–99)
GLUCOSE BLDC GLUCOMTR-MCNC: 185 MG/DL (ref 70–99)
GLUCOSE SERPL-MCNC: 237 MG/DL (ref 70–99)
HBA1C MFR BLD: 6.3 % (ref 0–5.6)
HCO3 BLD-SCNC: 22 MMOL/L (ref 21–28)
HCT VFR BLD AUTO: 40.5 % (ref 40–53)
HGB BLD-MCNC: 13.7 G/DL (ref 13.3–17.7)
LACTATE BLD-SCNC: 2 MMOL/L (ref 0.7–2)
LACTATE BLD-SCNC: 2.5 MMOL/L (ref 0.7–2)
LACTATE BLD-SCNC: 3.9 MMOL/L (ref 0.7–2)
LACTATE BLD-SCNC: 4.1 MMOL/L (ref 0.7–2)
LACTATE BLD-SCNC: 4.8 MMOL/L (ref 0.7–2)
LACTATE BLD-SCNC: 5.2 MMOL/L (ref 0.7–2)
LYMPHOCYTES # BLD AUTO: 2 10E9/L (ref 0.8–5.3)
LYMPHOCYTES NFR BLD AUTO: 17 %
MAGNESIUM SERPL-MCNC: 1.7 MG/DL (ref 1.6–2.3)
MCH RBC QN AUTO: 30.9 PG (ref 26.5–33)
MCHC RBC AUTO-ENTMCNC: 33.8 G/DL (ref 31.5–36.5)
MCV RBC AUTO: 91 FL (ref 78–100)
METAMYELOCYTES # BLD: 1.8 10E9/L
METAMYELOCYTES NFR BLD MANUAL: 15 %
MONOCYTES # BLD AUTO: 0.8 10E9/L (ref 0–1.3)
MONOCYTES NFR BLD AUTO: 7 %
MYELOCYTES # BLD: 0.4 10E9/L
MYELOCYTES NFR BLD MANUAL: 3 %
NEUTROPHILS # BLD AUTO: 4.6 10E9/L (ref 1.6–8.3)
NEUTROPHILS NFR BLD AUTO: 39 %
NEUTS BAND # BLD AUTO: 2.3 10E9/L (ref 0–0.6)
NEUTS BAND NFR BLD MANUAL: 19 %
O2/TOTAL GAS SETTING VFR VENT: ABNORMAL %
OXYHGB MFR BLD: 95 % (ref 92–100)
PCO2 BLD: 36 MM HG (ref 35–45)
PH BLD: 7.4 PH (ref 7.35–7.45)
PHOSPHATE SERPL-MCNC: 3 MG/DL (ref 2.5–4.5)
PLATELET # BLD AUTO: 213 10E9/L (ref 150–450)
PLATELET # BLD EST: ABNORMAL 10*3/UL
PO2 BLD: 71 MM HG (ref 80–105)
POTASSIUM SERPL-SCNC: 4.1 MMOL/L (ref 3.4–5.3)
PROCALCITONIN SERPL-MCNC: 37.13 NG/ML
PROT SERPL-MCNC: 5 G/DL (ref 6.8–8.8)
RBC # BLD AUTO: 4.44 10E12/L (ref 4.4–5.9)
RBC MORPH BLD: ABNORMAL
SODIUM SERPL-SCNC: 141 MMOL/L (ref 133–144)
SPECIMEN SOURCE: NORMAL
TROPONIN I SERPL-MCNC: 0.07 UG/L (ref 0–0.04)
VARIANT LYMPHS BLD QL SMEAR: PRESENT
WBC # BLD AUTO: 11.9 10E9/L (ref 4–11)

## 2019-06-01 PROCEDURE — 83036 HEMOGLOBIN GLYCOSYLATED A1C: CPT | Performed by: NURSE PRACTITIONER

## 2019-06-01 PROCEDURE — C9113 INJ PANTOPRAZOLE SODIUM, VIA: HCPCS | Performed by: NURSE PRACTITIONER

## 2019-06-01 PROCEDURE — 01996 DLY HOSP MGMT EDRL RX ADMIN: CPT | Performed by: NURSE ANESTHETIST, CERTIFIED REGISTERED

## 2019-06-01 PROCEDURE — 84484 ASSAY OF TROPONIN QUANT: CPT | Performed by: HOSPITALIST

## 2019-06-01 PROCEDURE — 25800030 ZZH RX IP 258 OP 636: Performed by: SURGERY

## 2019-06-01 PROCEDURE — 36415 COLL VENOUS BLD VENIPUNCTURE: CPT | Performed by: SURGERY

## 2019-06-01 PROCEDURE — 25800030 ZZH RX IP 258 OP 636: Performed by: HOSPITALIST

## 2019-06-01 PROCEDURE — 99233 SBSQ HOSP IP/OBS HIGH 50: CPT | Performed by: NURSE PRACTITIONER

## 2019-06-01 PROCEDURE — 83605 ASSAY OF LACTIC ACID: CPT | Performed by: HOSPITALIST

## 2019-06-01 PROCEDURE — 84145 PROCALCITONIN (PCT): CPT | Performed by: NURSE PRACTITIONER

## 2019-06-01 PROCEDURE — 25000128 H RX IP 250 OP 636: Performed by: NURSE PRACTITIONER

## 2019-06-01 PROCEDURE — 84100 ASSAY OF PHOSPHORUS: CPT | Performed by: SURGERY

## 2019-06-01 PROCEDURE — 82805 BLOOD GASES W/O2 SATURATION: CPT | Performed by: NURSE PRACTITIONER

## 2019-06-01 PROCEDURE — 25000125 ZZHC RX 250: Performed by: NURSE PRACTITIONER

## 2019-06-01 PROCEDURE — 36415 COLL VENOUS BLD VENIPUNCTURE: CPT | Performed by: HOSPITALIST

## 2019-06-01 PROCEDURE — 00000146 ZZHCL STATISTIC GLUCOSE BY METER IP

## 2019-06-01 PROCEDURE — 25000128 H RX IP 250 OP 636: Performed by: SURGERY

## 2019-06-01 PROCEDURE — 94003 VENT MGMT INPAT SUBQ DAY: CPT

## 2019-06-01 PROCEDURE — 83735 ASSAY OF MAGNESIUM: CPT | Performed by: SURGERY

## 2019-06-01 PROCEDURE — 20000003 ZZH R&B ICU

## 2019-06-01 PROCEDURE — 25800030 ZZH RX IP 258 OP 636: Performed by: NURSE PRACTITIONER

## 2019-06-01 PROCEDURE — 40000788 ZZHCL STATISTIC ESTIMATED AVERAGE GLUCOSE: Performed by: NURSE PRACTITIONER

## 2019-06-01 PROCEDURE — 80053 COMPREHEN METABOLIC PANEL: CPT | Performed by: SURGERY

## 2019-06-01 PROCEDURE — 71045 X-RAY EXAM CHEST 1 VIEW: CPT | Mod: TC

## 2019-06-01 PROCEDURE — 85025 COMPLETE CBC W/AUTO DIFF WBC: CPT | Performed by: SURGERY

## 2019-06-01 PROCEDURE — 40000275 ZZH STATISTIC RCP TIME EA 10 MIN

## 2019-06-01 RX ORDER — NICOTINE POLACRILEX 4 MG
15-30 LOZENGE BUCCAL
Status: DISCONTINUED | OUTPATIENT
Start: 2019-06-01 | End: 2019-06-11 | Stop reason: HOSPADM

## 2019-06-01 RX ORDER — DEXTROSE MONOHYDRATE 25 G/50ML
25-50 INJECTION, SOLUTION INTRAVENOUS
Status: DISCONTINUED | OUTPATIENT
Start: 2019-06-01 | End: 2019-06-11 | Stop reason: HOSPADM

## 2019-06-01 RX ORDER — CEFOXITIN SODIUM 2 G/50ML
2 INJECTION, SOLUTION INTRAVENOUS
Status: DISCONTINUED | OUTPATIENT
Start: 2019-06-01 | End: 2019-06-01

## 2019-06-01 RX ORDER — CEFOXITIN SODIUM 2 G/50ML
2 INJECTION, SOLUTION INTRAVENOUS
Status: COMPLETED | OUTPATIENT
Start: 2019-06-02 | End: 2019-06-02

## 2019-06-01 RX ADMIN — PROPOFOL 40 MCG/KG/MIN: 10 INJECTION, EMULSION INTRAVENOUS at 23:40

## 2019-06-01 RX ADMIN — SODIUM CHLORIDE, POTASSIUM CHLORIDE, SODIUM LACTATE AND CALCIUM CHLORIDE: 600; 310; 30; 20 INJECTION, SOLUTION INTRAVENOUS at 16:45

## 2019-06-01 RX ADMIN — PROPOFOL 40 MCG/KG/MIN: 10 INJECTION, EMULSION INTRAVENOUS at 15:31

## 2019-06-01 RX ADMIN — TAZOBACTAM SODIUM AND PIPERACILLIN SODIUM 4.5 G: 500; 4 INJECTION, SOLUTION INTRAVENOUS at 03:49

## 2019-06-01 RX ADMIN — SODIUM CHLORIDE, POTASSIUM CHLORIDE, SODIUM LACTATE AND CALCIUM CHLORIDE: 600; 310; 30; 20 INJECTION, SOLUTION INTRAVENOUS at 13:44

## 2019-06-01 RX ADMIN — CHLORHEXIDINE GLUCONATE ORAL RINSE 15 ML: 1.2 SOLUTION DENTAL at 19:38

## 2019-06-01 RX ADMIN — TAZOBACTAM SODIUM AND PIPERACILLIN SODIUM 4.5 G: 500; 4 INJECTION, SOLUTION INTRAVENOUS at 16:05

## 2019-06-01 RX ADMIN — CHLORHEXIDINE GLUCONATE ORAL RINSE 15 ML: 1.2 SOLUTION DENTAL at 08:13

## 2019-06-01 RX ADMIN — SODIUM CHLORIDE, POTASSIUM CHLORIDE, SODIUM LACTATE AND CALCIUM CHLORIDE 1000 ML: 600; 310; 30; 20 INJECTION, SOLUTION INTRAVENOUS at 01:28

## 2019-06-01 RX ADMIN — PANTOPRAZOLE SODIUM 40 MG: 40 INJECTION, POWDER, FOR SOLUTION INTRAVENOUS at 21:10

## 2019-06-01 RX ADMIN — PANTOPRAZOLE SODIUM 40 MG: 40 INJECTION, POWDER, FOR SOLUTION INTRAVENOUS at 09:41

## 2019-06-01 RX ADMIN — PROPOFOL 40 MCG/KG/MIN: 10 INJECTION, EMULSION INTRAVENOUS at 04:32

## 2019-06-01 RX ADMIN — ENOXAPARIN SODIUM 40 MG: 40 INJECTION SUBCUTANEOUS at 13:44

## 2019-06-01 RX ADMIN — SODIUM CHLORIDE, POTASSIUM CHLORIDE, SODIUM LACTATE AND CALCIUM CHLORIDE: 600; 310; 30; 20 INJECTION, SOLUTION INTRAVENOUS at 23:12

## 2019-06-01 RX ADMIN — TAZOBACTAM SODIUM AND PIPERACILLIN SODIUM 4.5 G: 500; 4 INJECTION, SOLUTION INTRAVENOUS at 09:44

## 2019-06-01 RX ADMIN — TAZOBACTAM SODIUM AND PIPERACILLIN SODIUM 4.5 G: 500; 4 INJECTION, SOLUTION INTRAVENOUS at 21:34

## 2019-06-01 RX ADMIN — PROPOFOL 40 MCG/KG/MIN: 10 INJECTION, EMULSION INTRAVENOUS at 00:26

## 2019-06-01 RX ADMIN — PROPOFOL 40 MCG/KG/MIN: 10 INJECTION, EMULSION INTRAVENOUS at 19:28

## 2019-06-01 RX ADMIN — PROPOFOL 40 MCG/KG/MIN: 10 INJECTION, EMULSION INTRAVENOUS at 08:20

## 2019-06-01 RX ADMIN — NOREPINEPHRINE BITARTRATE 0.09 MCG/KG/MIN: 1 INJECTION, SOLUTION, CONCENTRATE INTRAVENOUS at 09:18

## 2019-06-01 RX ADMIN — SODIUM CHLORIDE, POTASSIUM CHLORIDE, SODIUM LACTATE AND CALCIUM CHLORIDE: 600; 310; 30; 20 INJECTION, SOLUTION INTRAVENOUS at 02:33

## 2019-06-01 RX ADMIN — SODIUM CHLORIDE, POTASSIUM CHLORIDE, SODIUM LACTATE AND CALCIUM CHLORIDE: 600; 310; 30; 20 INJECTION, SOLUTION INTRAVENOUS at 09:20

## 2019-06-01 RX ADMIN — PROPOFOL 40 MCG/KG/MIN: 10 INJECTION, EMULSION INTRAVENOUS at 11:49

## 2019-06-01 ASSESSMENT — ACTIVITIES OF DAILY LIVING (ADL)
ADLS_ACUITY_SCORE: 16

## 2019-06-01 ASSESSMENT — MIFFLIN-ST. JEOR: SCORE: 1724.88

## 2019-06-01 NOTE — PLAN OF CARE
Per MD order neosynepherine gtt discontinued, replaced w/Levophed @ 1521, titrated t/o shift, current dose 0.15mcg/kg/min. Per ART line MAP 65-70's. Vented, FiO2 50%, sats remained above 92%, ABG improving, recheck in am. LS coarse, fine crackles to bases. Scant brown PO secretions suctioned. ABD open, dressing in tact, drains set to LIS returned 300mL serosanguinous bloody drainage. NG to LIS no output noted since admission to unit. Propofol for sedation, fentanyl infusion for pain control in conjunction w/epidural. BLE cool/pale, pedal pulses +2. Diaphoretic much of shift, improving after discontinuation of neosynepherine. Restraints, T&R, PO cares q2h. Meeks in place putting out 200mL cloudy kendal urine since admission to unit. IVF continues, ABX-Zosyn. Son and dtr in law present for brief period, questions and concerns addressed, son took pts wallet, cell phone & , and razor home.     Face to face report given with opportunity to observe patient.    Report given to Carrie GARNER RN.     Jony Bettencourt   5/31/2019  7:46 PM

## 2019-06-01 NOTE — PLAN OF CARE
Unable to complete pt med rec from home. Per pt son, pt has had [facility] in past and unknown if still has.

## 2019-06-01 NOTE — PROVIDER NOTIFICATION
NP text paged about PTA synthroid use, and possibly adding IV alternative. No new orders at this time.

## 2019-06-01 NOTE — PLAN OF CARE
Face to face report given with opportunity to observe patient.    Report given to Dylan Crandall   6/1/2019  7:22 AM

## 2019-06-01 NOTE — PHARMACY
[facility]    Pharmacy      Antimicrobial Stewardship Note     Current antimicrobial therapy:  Anti-infectives (From now, onward)    Start     Dose/Rate Route Frequency Ordered Stop    05/31/19 0900  piperacillin-tazobactam (ZOSYN) intermittent infusion 4.5 g      4.5 g  200 mL/hr over 30 Minutes Intravenous EVERY 6 HOURS 05/31/19 0847          Indication: perforated small bowel, sepsis    Days of Therapy: 2     Pertinent labs:  Creatinine   Creatinine   Date Value Ref Range Status   06/01/2019 1.19 0.66 - 1.25 mg/dL Final   05/31/2019 1.53 (H) 0.66 - 1.25 mg/dL Final   05/31/2019 1.29 (H) 0.66 - 1.25 mg/dL Final     WBC   WBC   Date Value Ref Range Status   06/01/2019 11.9 (H) 4.0 - 11.0 10e9/L Final   05/31/2019 5.4 4.0 - 11.0 10e9/L Final   05/31/2019 11.9 (H) 4.0 - 11.0 10e9/L Final     Procalcitonin   Procalcitonin   Date Value Ref Range Status   06/01/2019 37.13 (HH) ng/ml Final     Comment:     Critical Value called to and read back by  DIONE SINGH AT 1328 ON 06/01/19 AJE  >/= 10.00 ng/ml   Very high likelihood of severe sepsis or septic shock.  Recommendation: Strongly recommend initiating or continuing antibiotics.   Evaluate culture results and clinical features to target antibacterial   therapy. Obtain blood cultures and other relevant cultures if not done.Repeat   PCT in 2 days to guide antibiotic de-escalation. Consider de-escalating   antibiotics when PCT concentration is <80% of peak or abs PCT <1.     05/31/2019 15.54 (HH) ng/ml Final     Comment:     >/= 10.00 ng/ml   Very high likelihood of severe sepsis or septic shock.  Recommendation: Strongly recommend initiating or continuing antibiotics.   Evaluate culture results and clinical features to target antibacterial   therapy. Obtain blood cultures and other relevant cultures if not done.Repeat   PCT in 2 days to guide antibiotic de-escalation. Consider de-escalating   antibiotics when PCT concentration is <80% of peak or abs PCT  <1.  Critical Value called to and read back by  RASHAD NARAYANAN AT 1429 ON 5/31/2019 BY ERM       CRP No results found for: CRP    Culture Results:   7-Day Micro Results     Procedure Component Value Units Date/Time   Active MRSA Surveillance Culture    Order Status: Canceled Lab Status: No result    Specimen: Nasal Swab    Blood culture [[id number]] Collected: 05/31/19 0909   Order Status: Completed Lab Status: Preliminary result Updated: 06/01/19 0608   Specimen: Blood     Specimen Description Blood    Culture Micro No growth after 20 hours   Blood culture [[id number]] Collected: 05/31/19 0902   Order Status: Completed Lab Status: Preliminary result Updated: 06/01/19 0608   Specimen: Blood     Specimen Description Blood    Culture Micro No growth after 21 hours   Active MRSA Surveillance Culture [[id number]] Collected: 05/31/19 0430   Order Status: Completed Lab Status: Final result Updated: 06/01/19 0618   Specimen: Swab from Nose     Specimen Description Swab    Culture Micro No MRSA isolated       Recommendations/Interventions:  1. No recommendations at this time.     Karishma Pineda RPH  June 1, 2019

## 2019-06-01 NOTE — PROGRESS NOTES
[facility]    Hospitalist Progress Note    Date of Service (when I saw the patient): 06/01/2019    Assessment & Plan        Small bowel perforation (H): Presented with abdominal pain. 5/31/19 morning demonstrated acute abdomen on assessment. Dr. Vilchis was notified and he took him to OR urgently. Please see operative note.   -6/1/19-POD#1 Remains sedated,ventilated, abdomen open. NGT copious black returns, ETT minimal secretions of same color. Abdominal wound open with suction dressing with moderate amounts serosanguinous output.       Mechanical ventilation: Vent post-op due to severe sepsis with respiratory compromise, open abdomen and plan to return to OR tomorrow for second look. First ABG on arrival to floor showed mixed acidosis-corrected with increase TV from 500 to 600. No known underlying chronic lung disease. CXR shows ETT in satisfactory position. ABG looks great. Ventilator management done in collaboration with Dr. Vilchis.       Metabolic and respiratory acidosis: Resolved. Initial vent settings , rate 12 (breathing 16-20) and PEEP 5. Increased tidal volume to 550 then 600.       Severe Sepsis (H): Improving. Due to acute peritonitis from small bowel perforation. Toxic appearing on first appearance with severe pain. Given 3 liter fluid bolus, started on IV Zosyn. Lactic acid was 4. To OR urgently for source control. IN pre-op patient developed hypoxia, worsened tachypnea, hypotension. He was started on neosynephrine gtt in the OR.  He continues to require processors, switched over to levophed on arrival to the floor, today he is requiring minimal doses and much less labile. Lactic acid continues to be elevated but likely a combination of factors including increased work of breathing, large open abdominal wound. Will continue to monitor periodically but all use Procalcitonin and CRP to evaluate response. Continue IVF boluses for decreased urine output, increased levophed needs etc.       AILEEN: Due to severe sepsis with end organ damage. Urine output decent through case but started dropping off post-operatively. Creatinine bumped from 1.08 to 1.29 then 1.59. With levophed MAP has been kept consistently >65. Renal function back to normal already this morning. Likely 3rd spacing fluid to abdomen and will continue to require further fluid boluses.       Chronic atrial fibrillation (H): Has a pacemaker, has been on Xarelto in the past but was taken off of it due to GI bleeding.       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 He did not have any immediate complications during that hospital stay. He is following-up with oncology.       Cardiac pacemaker in situ      DVT Prophylaxis: Enoxaparin (Lovenox) subcutaneous-will start Lovenox tomorrow as had epidural, SCD's now  Code Status: DNR    Disposition: Expected discharge in several days once stablized.    Kenisha Rolle, CNP    Interval History   Remains sedated, in no acute distress. He does cough with suctioning.     -Data reviewed today: I reviewed all new labs and imaging results over the last 24 hours.     Physical Exam   Temp: 99  F (37.2  C) Temp src: Tympanic BP: 105/58 Pulse: 65 Heart Rate: 68 Resp: 15 SpO2: 96 % O2 Device: Mechanical Ventilator    Vitals:    05/30/19 2314 05/31/19 0523 06/01/19 0540   Weight: 106.6 kg (235 lb) 102.5 kg (225 lb 15.5 oz) 109.3 kg (240 lb 15.4 oz)     Vital Signs with Ranges  Temp:  [97.1  F (36.2  C)-99.9  F (37.7  C)] 99  F (37.2  C)  Pulse:  [65-67] 65  Heart Rate:  [] 68  Resp:  [13-23] 15  BP: ()/(48-74) 105/58  MAP:  [51 mmHg-93 mmHg] 58 mmHg  FiO2 (%):  [40 %-50 %] 50 %  SpO2:  [89 %-99 %] 96 %  I/O last 3 completed shifts:  In: 9541.87 [I.V.:9857.87; NG/GT:60; IV Piggyback:4644]  Out: 2015 [Urine:1000; Emesis/NG output:150; Drains:850; Blood:15]    Peripheral IV 07/24/15 Right Hand (Active)   Number of days: 1408       CVC Triple Lumen  05/31/19 Right (Active)   Site Assessment WDL 6/1/2019 12:00 PM   Dressing Intervention Chlorhexidine sponge;Transparent 6/1/2019 12:00 PM   Dressing Change Due 06/07/19 5/31/2019  8:00 PM   Lumen A - Color BROWN 6/1/2019 12:00 PM   Lumen A - Status saline locked 6/1/2019 12:00 PM   Lumen A - Cap Change Due 06/01/19 6/1/2019  4:00 AM   Lumen B - Color BLUE 6/1/2019 12:00 PM   Lumen B - Status infusing 6/1/2019 12:00 PM   Lumen B - Cap Change Due 06/07/19 5/31/2019  2:30 PM   Lumen C - Color WHITE 6/1/2019 12:00 PM   Lumen C - Status infusing 6/1/2019 12:00 PM   Lumen C - Cap Change Due 06/07/19 5/31/2019  2:30 PM   Number of days: 1       Intrathecal/Epidural Catheter 05/31/19 (Active)   Site Assessment Other (Comment) 6/1/2019 12:00 PM   Line Status Infusing 6/1/2019 12:00 PM   Dressing Type Gauze;Transparent 6/1/2019 12:00 PM   Dressing Status Drainage on dressing 6/1/2019 12:00 PM   Number of days: 1       ETT (adult) 8 (Active)   Secured By Commercial tube salazar 6/1/2019 12:00 PM   Site Appearance Clean and dry 6/1/2019 12:00 PM   Secured at (cm) to lip 24 cm 6/1/2019 12:00 PM   Site of ET Tube Securement At lip 6/1/2019 12:00 PM   Cuff Pressure - Type other (see comments) 6/1/2019  5:38 AM   Cuff Pressure - cm H2O 20 cmH20 6/1/2019  5:38 AM   Tube Care/Reposition repositioned tube center of mouth 6/1/2019 11:18 AM   Bite Block Secure and Patent 6/1/2019 12:00 PM   Safety Measures manual resuscitator at bedside 6/1/2019 12:00 PM   Number of days: 1       Open Drain Medial Abdomen 32 [place] CASSETTE DRAPE CUT TO SIZE BY MD, 2 TOWELS, 2 CHEST TUBES WITH Y CONNECTOR ATTACHED TO FLORES BAG TO DRAIN (Active)   Site Description WDL 6/1/2019 12:00 PM   Dressing Status Dressing Reinforced 6/1/2019 12:00 PM   Drainage Appearance Tan 6/1/2019 12:00 PM   Status Other (Comment) 6/1/2019 12:00 PM   Output (ml) 200 ml 6/1/2019  4:00 AM   Number of days: 1       NG/OG Tube Nasogastric 16 fr Left mouth (Active)   Site  Description WDL 6/1/2019 12:00 PM   Status Suction-low intermittent 6/1/2019 12:00 PM   Drainage Appearance Brown 6/1/2019 12:00 PM   [place] (cm marking) at nare/mouth 60 cm 6/1/2019 12:00 PM   Flush/Free Water (mL) 30 mL 6/1/2019  4:00 AM   Output (ml) 0 ml 6/1/2019  4:00 AM   Number of days: 1       Urethral Catheter Coude 16 fr (Active)   Tube Description Positional 6/1/2019 12:00 PM   Catheter Care Done 6/1/2019 12:00 PM   Collection Container Standard 6/1/2019 12:00 PM   Securement Method Securing device (Describe) 6/1/2019 12:00 PM   Rationale for Continued Use Strict 1-2 Hour I&O 6/1/2019 12:00 PM   Urine Output 40 mL 6/1/2019 12:00 PM   Number of days: 1       Incision/Surgical Site 05/31/19 Abdomen (Active)   Incision Assessment WDL except;Edges not approximated 6/1/2019 12:00 PM   Magaly-Incision Assessment Erythema 6/1/2019 12:00 PM   Closure Other (Comment) 6/1/2019 12:00 PM   Incision Drainage Amount Moderate 6/1/2019 12:00 PM   Drainage Description Brown;Yellow 6/1/2019 12:00 PM   Incision Care Barrier Film 6/1/2019 12:00 PM   Number of days: 1     Line/device assessment(s) completed for medical necessity    Constitutional: Sedated, pale, ill appearing.   Respiratory:Mildly diminished bilaterally otherwise quite clear. Breathing well on vent, peak pressures look good, breathing over set rate.  Cardiovascular: HRR, paced on telemetry. No peripheral edema. Cap refill is brisk, fingers and toes are cool to touch.   GI: Much softer today, wound open with sponge suction dressing overlay. Thin bloody drainage with suction.   Skin/Integumen: Pale, no rashes or open areas.       Medications     fentaNYL (SUBLIMAZE) 2500 mcg in 100 mL infusion 25 mcg/hr (06/01/19 0580)     lactated ringers 150 mL/hr at 06/01/19 0920     - MEDICATION INSTRUCTIONS -       - MEDICATION INSTRUCTIONS -       norepinephrine 0.09 mcg/kg/min (06/01/19 0918)     propofol (DIPRIVAN) infusion 40 mcg/kg/min (06/01/19 1148)        chlorhexidine  15 mL Mouth/Throat Q12H     enoxaparin  40 mg Subcutaneous Q24H     fentaNYL (SUBLIMAZE) 2 mcg/mL, bupivacaine (MARCAINE) 0.125% in  mL PCEA infusion   EPIDURAL PCA     insulin aspart  1-6 Units Subcutaneous Q4H     lactated ringers  1,000 mL Intravenous Once     pantoprazole (PROTONIX) IV  40 mg Intravenous BID     piperacillin-tazobactam  4.5 g Intravenous Q6H     sodium chloride (PF)  3 mL Intracatheter Q8H       Data   Recent Labs   Lab 06/01/19  0449 06/01/19  0108 05/31/19  2055 05/31/19  1759 05/31/19  1507 05/31/19  1350 05/31/19  0742 05/30/19  2315   WBC 11.9*  --   --   --  5.4  --  11.9* 11.9*   HGB 13.7  --   --   --  15.5  --  16.4 14.4   MCV 91  --   --   --  93  --  89 89     --   --   --  260  --  261 256   INR  --   --   --   --   --   --  1.16 1.05     --   --  142  --  141 140 140   POTASSIUM 4.1  --   --  3.4  --  4.0 3.9 3.7   CHLORIDE 108  --   --  108  --  109 107 104   CO2 23  --   --  22  --  20 21 25   BUN 29  --   --  32*  --  28 28 26   CR 1.19  --   --  1.53*  --  1.29* 1.08 1.09   ANIONGAP 10  --   --  12  --  12 12 11   ERIKA 7.8*  --   --  7.7*  --  7.6* 8.4* 8.9   *  --   --  216*  --  205* 229* 170*   ALBUMIN 2.0*  --   --   --   --   --   --   --    PROTTOTAL 5.0*  --   --   --   --   --   --   --    BILITOTAL 1.8*  --   --   --   --   --   --   --    ALKPHOS 44  --   --   --   --   --   --   --    ALT 14  --   --   --   --   --   --   --    AST 17  --   --   --   --   --   --   --    TROPI  --  0.066* 0.073* 0.084*  --   --   --   --        Recent Results (from the past 24 hour(s))   XR Chest Port 1 View    Narrative    PROCEDURE:  XR CHEST PORT 1 VW    HISTORY:  ET placement AND central line placement.     COMPARISON:  None.    FINDINGS:   The cardiac silhouette is normal in size. There is a transvenous  pacemaker in place. The pulmonary vasculature is normal.  There is an  tracheal tube is tip approximately 3.5 cm above the kassandra. There  is a  central catheter with its tip in the right atrium. No pleural effusion  or pneumothorax.      Impression    IMPRESSION:  Endotracheal tube with its tip approximately 3.5 cm above  the kassandra      EMMANUEL BILLY MD   XR Chest Port 1 View    Narrative    PROCEDURE:  XR CHEST PORT 1 VW    HISTORY:  Re-check central line placement.     COMPARISON:  None.    FINDINGS:   The heart is normal in size. This transvenous pacemaker in place. A  central venous catheter has been withdrawn and its tip now lies in the  superior vena cava. The endotracheal tube lies with its tip  approximately 4 cm above the kassandra.       Impression    IMPRESSION:  Central venous catheter with its tip in the superior vena  cava      EMMANUEL BILLY MD   XR Chest Port 1 View    Narrative    PROCEDURE:  XR CHEST PORT 1 VW    HISTORY:  vent.     COMPARISON:  5/31/2019    FINDINGS:   Endotracheal tube and right-sided central line are in stable position.  The cardiac silhouette is stable in size. The pulmonary vasculature is  normal.  Patchy atelectasis in the lung bases and small pleural  effusions appear similar. Left sided cardiac generator device is  stable.      Impression    IMPRESSION:  Stable appearance of the chest. Support lines and tubes  are in stable position.      AVERY WOOD MD

## 2019-06-01 NOTE — PROVIDER NOTIFICATION
Updated NP of twitching to BLE greater on the LLE than RLE, not continuous but approx 2 min duration. NP ordered to notify anesthesia.     Anesthesia notified of twitching, states unlikely d/t epidural as this would decrease activity/increase paralysis. Unless there is a hematoma forming at site, during 2pm assessment there was not.     Will continue to monitor.

## 2019-06-01 NOTE — PROVIDER NOTIFICATION
Anesthesia Cici Aponte notified per phone. Upon turning pt and checking epidural drsg. 3 small spots of blood and drsg serous saturated. Anesthesia not concerned at this time. Will continue to monitor.

## 2019-06-01 NOTE — PROVIDER NOTIFICATION
DATE:  6/1/2019   TIME OF RECEIPT FROM LAB:  1328  LAB TEST:  Procalcitonin  LAB VALUE:  37.13  RESULTS GIVEN WITH READ-BACK TO (PROVIDER): Aquilino LEÓN  TIME LAB VALUE REPORTED TO PROVIDER:   1328    NP ordered repeat check.

## 2019-06-01 NOTE — PROVIDER NOTIFICATION
DATE:  5/31/2019   TIME OF RECEIPT FROM LAB:  2100LAB TEST:    LAB VALUE:  LA 4.3  RESULTS GIVEN WITH READ-BACK TO (PROVIDER):  Dr Miller  TIME LAB VALUE REPORTED TO PROVIDER:   2105    Dr Miller here. Aware of increased Levophed to 0.18 mcg/ kg /min.  For decreased BP and LR opened. VORB to give 1 liter LR fluid bolus orders pending.

## 2019-06-01 NOTE — PROVIDER NOTIFICATION
DATE:  6/1/2019   TIME OF RECEIPT FROM LAB:  6506  LAB TEST:  Lactic Acid  LAB VALUE:  3.9  RESULTS GIVEN WITH READ-BACK TO (PROVIDER):  Allison LEÓN via text page  TIME LAB VALUE REPORTED TO PROVIDER:   6118

## 2019-06-01 NOTE — PROVIDER NOTIFICATION
DATE:  6/1/2019   TIME OF RECEIPT FROM LAB:  4998  LAB TEST:  Lactic Acid  LAB VALUE:  2.5  RESULTS GIVEN WITH READ-BACK TO (PROVIDER):  Dr. Zamora  TIME LAB VALUE REPORTED TO PROVIDER:   4826

## 2019-06-01 NOTE — PLAN OF CARE
DATE:  5/31/2019   TIME OF RECEIPT FROM LAB:  1510  LAB TEST:  lactic  LAB VALUE:    RESULTS GIVEN WITH READ-BACK TO (PROVIDER):  Allison LEÓN  TIME LAB VALUE REPORTED TO PROVIDER:   1511    Telephone call received and reported off by Yulisa MCCAULEY. Forgot to chart.

## 2019-06-01 NOTE — PROGRESS NOTES
[facility] General Surgery Progress Note         Assessment and Plan:    Assessment:   78 yo male POD #1 s/p exploratory laparotomy from small bowel perforation with fascial dehiscence      Plan:   Neuro: continue Fentanyl IV and will keep EDC, propofol for sedation  Cardiac: Norepi infusion continues to decrease, currently at 0.08, HR and pressure stable and responded appropriately to fluid boluses throughout the night  Pulm: continue Vent management with AC , Chest Xray stable, ABG improved this morning 7.40/36/71/22 on 50% FiO2  GI: NPO, abdominal dressing in place with serosanguinous fluid   : Continue to monitor UOP with khalil, Kidney function improving Cr 1.19 down from 1.53  FEN: Continue fluid resuscitation with lactated ringers, electrolytes stable, no parenteral nutrition needed at this point, Total IVF rate ~200/hr   Prophylaxis: SCDs, Lovenox daily  Heme/ID: continue Zosyn 4.5 q6 for perforated small bowel, sepsis  Will plan on going back to the OR tomorrow as patient is clinically stable at this point with no emergent need for a second look operation. If he deteriorates throughout the day then may need to washout today, but for now will monitor.    Additional problems to be followed by medicine team           Interval History:   The patient remained sedated and intubated overnight. His pressor requirements have gone down throughout the night. Upon sitting the patient up for his chest xray his abdominal wound put out a significant amount of fluid. He was responding this morning by wiggling his toes.          Significant Problems:    Active Problems:    Sepsis (H)    Chronic atrial fibrillation (H)    Malignant neoplasm of transverse colon (H)    Small bowel perforation (H)    Cardiac pacemaker in situ            Review of Systems:    The patient denies any chest pain, shortness of breath, excessive pain, fever, chills, purulent drainage from the wound, nausea or vomiting.          Medications:     All medications related to the patient's surgery have been reviewed  Current Facility-Administered Medications   Medication     chlorhexidine (PERIDEX) 0.12 % solution 15 mL     enoxaparin (LOVENOX) injection 40 mg     fentaNYL (PF) (SUBLIMAZE) 2,500 mcg in D5W 100 mL infusion     fentaNYL (SUBLIMAZE) 2 mcg/mL, bupivacaine (MARCAINE) 0.125% in  mL PCEA infusion     ipratropium - albuterol 0.5 mg/2.5 mg/3 mL (DUONEB) neb solution 3 mL     lactated ringers infusion     medication instruction     medication instruction     nalbuphine (NUBAIN) injection 2.5-5 mg     naloxone (NARCAN) injection 0.1-0.4 mg     naloxone (NARCAN) injection 0.1-0.4 mg     norepinephrine (LEVOPHED) 16 mg in sodium chloride 0.9 % 250 mL infusion     ondansetron (ZOFRAN) injection 4 mg     pantoprazole (PROTONIX) 40 mg IV push injection     piperacillin-tazobactam (ZOSYN) intermittent infusion 4.5 g     propofol (DIPRIVAN) infusion    And     propofol (DIPRIVAN) injection 10 mg/mL vial     sodium chloride (PF) 0.9% PF flush 3 mL     sodium chloride (PF) 0.9% PF flush 3 mL             Physical Exam:     Vitals were reviewed  Patient Vitals for the past 8 hrs:   BP Temp Temp src Pulse Heart Rate Resp SpO2 Weight   06/01/19 0930 97/54 -- -- -- 65 -- 96 % --   06/01/19 0915 108/55 -- -- -- 67 -- -- --   06/01/19 0900 96/52 -- -- -- 68 17 95 % --   06/01/19 0845 109/54 -- -- -- 72 18 96 % --   06/01/19 0830 113/59 -- -- -- 78 21 99 % --   06/01/19 0815 104/54 -- -- -- 69 -- 94 % --   06/01/19 0800 97/54 -- -- -- 70 17 94 % --   06/01/19 0757 -- -- -- -- -- -- 94 % --   06/01/19 0756 110/61 98.4  F (36.9  C) Tympanic 67 71 19 94 % --   06/01/19 0745 110/61 -- -- -- 70 18 95 % --   06/01/19 0730 103/60 -- -- -- 70 14 95 % --   06/01/19 0715 108/64 -- -- -- 75 16 96 % --   06/01/19 0700 123/68 -- -- -- 77 18 94 % --   06/01/19 0645 113/68 -- -- -- 76 17 94 % --   06/01/19 0630 110/66 -- -- -- 75 16 94 % --   06/01/19 0615 108/59  -- -- -- 76 18 94 % --   06/01/19 0600 102/59 -- -- -- 72 19 -- --   06/01/19 0540 -- -- -- -- -- -- -- 109.3 kg (240 lb 15.4 oz)   06/01/19 0538 -- -- -- -- -- -- 93 % --   06/01/19 0500 127/69 -- -- -- 77 19 97 % --   06/01/19 0410 -- -- -- -- -- -- 97 % --   06/01/19 0405 -- -- -- -- -- -- 97 % --   06/01/19 0400 148/71 98.1  F (36.7  C) Tympanic -- 79 21 97 % --   06/01/19 0350 -- -- -- -- -- -- 97 % --   06/01/19 0347 134/68 -- -- -- 73 19 97 % --   06/01/19 0345 -- -- -- -- -- -- 96 % --   06/01/19 0343 129/66 -- -- -- 70 20 97 % --   06/01/19 0335 -- -- -- -- -- -- 98 % --   06/01/19 0334 157/70 -- -- -- 60 16 98 % --   06/01/19 0333 -- -- -- -- -- -- 98 % --   06/01/19 0330 -- -- -- -- -- -- 98 % --   06/01/19 0326 158/74 -- -- -- 60 -- -- --   06/01/19 0325 -- -- -- -- -- -- 98 % --   06/01/19 0320 -- -- -- -- -- -- 98 % --   06/01/19 0315 -- -- -- -- -- -- 98 % --   06/01/19 0305 -- -- -- -- -- 17 -- --   06/01/19 0300 -- -- -- -- -- 14 98 % --   06/01/19 0255 -- -- -- -- -- 18 -- --   06/01/19 0250 -- -- -- -- -- 16 -- --   06/01/19 0245 -- -- -- -- -- 18 98 % --   06/01/19 0240 -- -- -- -- -- 16 97 % --   06/01/19 0235 -- -- -- -- -- 20 98 % --   06/01/19 0230 -- -- -- -- -- 14 98 % --   06/01/19 0225 -- -- -- -- -- 20 98 % --   06/01/19 0220 -- -- -- -- -- 18 98 % --   06/01/19 0215 155/71 -- -- -- 60 20 98 % --   06/01/19 0210 -- -- -- -- -- -- 97 % --   06/01/19 0205 -- -- -- -- -- -- 97 % --   06/01/19 0200 -- -- -- -- -- -- 97 % --       General:  Sedated and intubated  Chest:   Course breath sound bilaterally  Cardiac:  Regular rate and rhythm  Abdomen:  Open abdominal dressing in place with serosanguinous output, soft, non-distended  Extremities:  Pitting edema to bilateral lower extremities    # Pain Assessment:  Current Pain Score 6/1/2019   Patient currently in pain? -   Pain score (0-10) -   Pain location -   Pain descriptors -   rFLACC pain score 0            Data:   All laboratory data  related to this surgery reviewed  Results for orders placed or performed during the hospital encounter of 05/30/19 (from the past 24 hour(s))   XR Chest 1 View    Narrative    PROCEDURE:  XR CHEST 1 VW    HISTORY:  pre-op.  Due to increasing dyspnea.     COMPARISON:  None.    FINDINGS:   The cardiac silhouette is normal in size. The pulmonary vasculature is  normal.  There is a poor inspiratory result with increased density  both lung bases most likely atelectasis. Is a transvenous pacemaker in  place. No pleural effusion or pneumothorax.      Impression    IMPRESSION:  Poor inspiratory result with bibasilar atelectatic  changes      EMMANUEL BILLY MD   Lactic acid whole blood   Result Value Ref Range    Lactic Acid 6.0 (HH) 0.7 - 2.0 mmol/L   Basic metabolic panel   Result Value Ref Range    Sodium 141 133 - 144 mmol/L    Potassium 4.0 3.4 - 5.3 mmol/L    Chloride 109 94 - 109 mmol/L    Carbon Dioxide 20 20 - 32 mmol/L    Anion Gap 12 3 - 14 mmol/L    Glucose 205 (H) 70 - 99 mg/dL    Urea Nitrogen 28 7 - 30 mg/dL    Creatinine 1.29 (H) 0.66 - 1.25 mg/dL    GFR Estimate 52 (L) >60 mL/min/[1.73_m2]    GFR Estimate If Black 60 (L) >60 mL/min/[1.73_m2]    Calcium 7.6 (L) 8.5 - 10.1 mg/dL   Procalcitonin   Result Value Ref Range    Procalcitonin 15.54 (HH) ng/ml   Blood gas arterial and oxyhgb   Result Value Ref Range    pH Arterial 7.25 (L) 7.35 - 7.45 pH    pCO2 Arterial 49 (H) 35 - 45 mm Hg    pO2 Arterial 85 80 - 105 mm Hg    Bicarbonate Arterial 21 21 - 28 mmol/L    FIO2 50%     Oxyhemoglobin Arterial 94 92 - 100 %    Base Deficit Art 6.1 mmol/L   XR Chest Port 1 View    Narrative    PROCEDURE:  XR CHEST PORT 1 VW    HISTORY:  ET placement AND central line placement.     COMPARISON:  None.    FINDINGS:   The cardiac silhouette is normal in size. There is a transvenous  pacemaker in place. The pulmonary vasculature is normal.  There is an  tracheal tube is tip approximately 3.5 cm above the kassandra. There is  a  central catheter with its tip in the right atrium. No pleural effusion  or pneumothorax.      Impression    IMPRESSION:  Endotracheal tube with its tip approximately 3.5 cm above  the kassandra      EMMANUEL BILLY MD   Lactic acid whole blood   Result Value Ref Range    Lactic Acid 4.5 (HH) 0.7 - 2.0 mmol/L   CBC with platelets differential   Result Value Ref Range    WBC 5.4 4.0 - 11.0 10e9/L    RBC Count 5.06 4.4 - 5.9 10e12/L    Hemoglobin 15.5 13.3 - 17.7 g/dL    Hematocrit 46.8 40.0 - 53.0 %    MCV 93 78 - 100 fl    MCH 30.6 26.5 - 33.0 pg    MCHC 33.1 31.5 - 36.5 g/dL    RDW 16.9 (H) 10.0 - 15.0 %    Platelet Count 260 150 - 450 10e9/L    Diff Method Manual Differential     % Neutrophils 29.0 %    % Lymphocytes 23.0 %    % Monocytes 7.0 %    % Eosinophils 0.0 %    % Basophils 0.0 %    % Band 23.0 %    % Metamyelocytes 15.0 %    % Myelocytes 3.0 %    Absolute Neutrophil 1.6 1.6 - 8.3 10e9/L    Absolute Lymphocytes 1.2 0.8 - 5.3 10e9/L    Absolute Monocytes 0.4 0.0 - 1.3 10e9/L    Absolute Eosinophils 0.0 0.0 - 0.7 10e9/L    Absolute Basophils 0.0 0.0 - 0.2 10e9/L    Absolute Bands 1.2 (H) 0.0 - 0.6 10e9/L    Absolute Metamyelocytes 0.8 (H) 0 10e9/L    Absolute Myelocytes 0.2 (H) 0 10e9/L    Anisocytosis Slight     Reactive Lymphs Present     Toxic Granulation Present     RBC Morphology Consistent with reported results     Platelet Estimate       Automated count confirmed.  Giant platelets are present.   Blood gas arterial and oxyhgb   Result Value Ref Range    pH Arterial 7.30 (L) 7.35 - 7.45 pH    pCO2 Arterial 44 35 - 45 mm Hg    pO2 Arterial 85 80 - 105 mm Hg    Bicarbonate Arterial 22 21 - 28 mmol/L    FIO2 50%     Oxyhemoglobin Arterial 95 92 - 100 %    Base Deficit Art 5.0 mmol/L   XR Chest Port 1 View    Narrative    PROCEDURE:  XR CHEST PORT 1 VW    HISTORY:  Re-check central line placement.     COMPARISON:  None.    FINDINGS:   The heart is normal in size. This transvenous pacemaker in place.  A  central venous catheter has been withdrawn and its tip now lies in the  superior vena cava. The endotracheal tube lies with its tip  approximately 4 cm above the kassandra.       Impression    IMPRESSION:  Central venous catheter with its tip in the superior vena  cava      EMMANUEL BILLY MD   Lactic acid whole blood   Result Value Ref Range    Lactic Acid 4.6 (HH) 0.7 - 2.0 mmol/L   Basic metabolic panel   Result Value Ref Range    Sodium 142 133 - 144 mmol/L    Potassium 3.4 3.4 - 5.3 mmol/L    Chloride 108 94 - 109 mmol/L    Carbon Dioxide 22 20 - 32 mmol/L    Anion Gap 12 3 - 14 mmol/L    Glucose 216 (H) 70 - 99 mg/dL    Urea Nitrogen 32 (H) 7 - 30 mg/dL    Creatinine 1.53 (H) 0.66 - 1.25 mg/dL    GFR Estimate 42 (L) >60 mL/min/[1.73_m2]    GFR Estimate If Black 49 (L) >60 mL/min/[1.73_m2]    Calcium 7.7 (L) 8.5 - 10.1 mg/dL   Troponin I   Result Value Ref Range    Troponin I ES 0.084 (H) 0.000 - 0.045 ug/L   Lactic acid whole blood   Result Value Ref Range    Lactic Acid 4.3 (HH) 0.7 - 2.0 mmol/L   Troponin I   Result Value Ref Range    Troponin I ES 0.073 (H) 0.000 - 0.045 ug/L   Lactic acid whole blood   Result Value Ref Range    Lactic Acid 5.2 (HH) 0.7 - 2.0 mmol/L   Troponin I   Result Value Ref Range    Troponin I ES 0.066 (H) 0.000 - 0.045 ug/L   Comprehensive metabolic panel   Result Value Ref Range    Sodium 141 133 - 144 mmol/L    Potassium 4.1 3.4 - 5.3 mmol/L    Chloride 108 94 - 109 mmol/L    Carbon Dioxide 23 20 - 32 mmol/L    Anion Gap 10 3 - 14 mmol/L    Glucose 237 (H) 70 - 99 mg/dL    Urea Nitrogen 29 7 - 30 mg/dL    Creatinine 1.19 0.66 - 1.25 mg/dL    GFR Estimate 57 (L) >60 mL/min/[1.73_m2]    GFR Estimate If Black 66 >60 mL/min/[1.73_m2]    Calcium 7.8 (L) 8.5 - 10.1 mg/dL    Bilirubin Total 1.8 (H) 0.2 - 1.3 mg/dL    Albumin 2.0 (L) 3.4 - 5.0 g/dL    Protein Total 5.0 (L) 6.8 - 8.8 g/dL    Alkaline Phosphatase 44 40 - 150 U/L    ALT 14 0 - 70 U/L    AST 17 0 - 45 U/L   Magnesium    Result Value Ref Range    Magnesium 1.7 1.6 - 2.3 mg/dL   CBC with platelets differential   Result Value Ref Range    WBC 11.9 (H) 4.0 - 11.0 10e9/L    RBC Count 4.44 4.4 - 5.9 10e12/L    Hemoglobin 13.7 13.3 - 17.7 g/dL    Hematocrit 40.5 40.0 - 53.0 %    MCV 91 78 - 100 fl    MCH 30.9 26.5 - 33.0 pg    MCHC 33.8 31.5 - 36.5 g/dL    RDW 16.9 (H) 10.0 - 15.0 %    Platelet Count 213 150 - 450 10e9/L    Diff Method Manual Differential     % Neutrophils 39.0 %    % Lymphocytes 17.0 %    % Monocytes 7.0 %    % Eosinophils 0.0 %    % Basophils 0.0 %    % Band 19.0 %    % Metamyelocytes 15.0 %    % Myelocytes 3.0 %    Absolute Neutrophil 4.6 1.6 - 8.3 10e9/L    Absolute Lymphocytes 2.0 0.8 - 5.3 10e9/L    Absolute Monocytes 0.8 0.0 - 1.3 10e9/L    Absolute Eosinophils 0.0 0.0 - 0.7 10e9/L    Absolute Basophils 0.0 0.0 - 0.2 10e9/L    Absolute Bands 2.3 (H) 0.0 - 0.6 10e9/L    Absolute Metamyelocytes 1.8 (H) 0 10e9/L    Absolute Myelocytes 0.4 (H) 0 10e9/L    Reactive Lymphs Present     RBC Morphology Consistent with reported results     Platelet Estimate       Automated count confirmed.  Giant platelets are present.   Phosphorus   Result Value Ref Range    Phosphorus 3.0 2.5 - 4.5 mg/dL   Lactic acid whole blood   Result Value Ref Range    Lactic Acid 4.8 (HH) 0.7 - 2.0 mmol/L   Blood gas arterial and oxyhgb   Result Value Ref Range    pH Arterial 7.40 7.35 - 7.45 pH    pCO2 Arterial 36 35 - 45 mm Hg    pO2 Arterial 71 (L) 80 - 105 mm Hg    Bicarbonate Arterial 22 21 - 28 mmol/L    FIO2 50%     Oxyhemoglobin Arterial 95 92 - 100 %    Base Deficit Art 2.0 mmol/L   XR Chest Port 1 View    Narrative    PROCEDURE:  XR CHEST PORT 1 VW    HISTORY:  vent.     COMPARISON:  5/31/2019    FINDINGS:   Endotracheal tube and right-sided central line are in stable position.  The cardiac silhouette is stable in size. The pulmonary vasculature is  normal.  Patchy atelectasis in the lung bases and small pleural  effusions appear similar.  Left sided cardiac generator device is  stable.      Impression    IMPRESSION:  Stable appearance of the chest. Support lines and tubes  are in stable position.      AVERY WOOD MD   Lactic acid whole blood   Result Value Ref Range    Lactic Acid 4.1 (HH) 0.7 - 2.0 mmol/L         Pastor Vilchis MD

## 2019-06-01 NOTE — PROGRESS NOTES
Patient remains on ventilator with settings at:  Mode: A/C  RR: 12  VT: 600  FIO2: 45  PEEP: 5  PS: 0  ET tube secured at: 24 at lip Size: 8  Cuff checked: Yes 25 cmh2o  Breath sounds: clear  SpO2: 95%  Suction: small amount of black secretions via ETT  Weaning trial attempted:   Ambu bag present: Yes at bedside

## 2019-06-01 NOTE — OP NOTE
[facility]   Operative Note    Pre-operative diagnosis: Need for hemodynamic monitoring   Post-operative diagnosis Same   Procedure: Right internal jugular central line placement with ultrasound guidance  Left radial artery line placement with ultrasound guidance   Surgeon(s): Pastor Vilchis MD           Findings: Proper placement with good blood return on central line and arterial waveform with A-line     Description of procedure:   The right neck was prepped and draped in sterile fashion.  Under ultrasound guidance the right internal jugular vein was accessed and the wire was passed into the right internal jugular vein.  Small skin incision was made at the entry site and the needle was removed.  The tract was then dilated over the wire and the triple-lumen catheter was then placed under standard Seldinger technique.  The 3 ports on the catheter were then aspirated and flushed with saline.  The catheter was sutured in place and a sterile dressing was then applied.    The left wrist was then prepped and draped in a sterile fashion.  Under ultrasound guidance the left radial artery was identified and entered under direct visualization with a access needle.  Wire was then used to pass through the needle and into the radial artery.  The needle was removed and the arterial line catheter was passed over the wire.  Wire was then removed and there was good arterial backflow in the catheter.  The arterial line was then set up to the monitor and there was appropriate waveform.  The art line was then sutured in place and a sterile dressing was applied.    Postprocedure imaging was then performed.

## 2019-06-01 NOTE — PROVIDER NOTIFICATION
Dr Miller here. Aware that art lline is not working properly, despite multiple attempts to rezero and reposition. NIPB cuff reprogrammed for 5- 15 min intervals. Levophed gtt titrated down to 0.12 mcg/kg/hr 11.5 ml to maintain MAP > 65 . Will continue to trouble shoot art and re zero..

## 2019-06-01 NOTE — PROVIDER NOTIFICATION
DATE:  6/1/2019   TIME OF RECEIPT FROM LAB:  0905  LAB TEST:  Lactic  LAB VALUE:  4.1  RESULTS GIVEN WITH READ-BACK TO (PROVIDER):  Aquilino LEÓN text paged  TIME LAB VALUE REPORTED TO PROVIDER:   0907

## 2019-06-01 NOTE — OP NOTE
[facility]   Operative Note    Pre-operative diagnosis: INCARCERATED INCISIONAL HERNIA   Post-operative diagnosis Fascial dehiscense with small bowel perforation   Procedure: Procedure(s):  EXPLORATORY LAPAROTOMY WITH RESECTION OF SMALL BOWEL, JEJUNUM, TEMPORARY ABDOMINAL WALL CLOSURE, SEROSAL REPAIR   Surgeon(s): Surgeon(s) and Role:     * Pastor Vilchis MD - Primary     * Jose Rodriges MD - Assisting   Estimated blood loss: 15 mL    Specimens: ID Type Source Tests Collected by Time Destination   A : small bowel Tissue Other SURGICAL PATHOLOGY EXAM Pastor Vilchis MD 5/31/2019 12:25 PM       Findings:  Upper midline fascial closure dehisced and resulted in small bowel perforation with enteric contamination. Two small serosal tears with mobilization that were repaired. Small bowel perforation resected and abdomen washed out. Temporary abdominal wall closure utilized.       Description of procedure:   The patient was transferred to the operating room and placed on the operating room table in supine position.  After induction of general endotracheal anesthetic the area was prepped and draped in sterile fashion.  A timeout was then done indicating patient, procedure, and antibiotics given.  Began with opening of the previous upper midline incision extending just below the xiphoid to inferior to the umbilicus.  After incising skin and subcutaneous tissues were dissected with electrocautery.  Immediately we entered a cavity of enteric contents.  Copious amounts of enteric contents were aspirated and removed from the upper midline cavity.  Upon visualization there is a large near circumferential enterotomy with complete fascial dehiscence from the previous closure.  With this it appeared that the fascia had dehisced resulting in enterotomy to the small bowel and thus spillage of enteric contents in the subcutaneous tissues.  The small bowel was adherent to the anterior abdominal wall this was  taken down with combination of electrocautery and blunt dissection.  Once the small bowel was released from the anterior abdominal wall adjacent to the previous fascial opening both clamps were placed proximally distally to the enterotomy.  Incision was then made to resect that portion of small bowel.  Window was created in the mesentery proximal to the enterotomy with electrocautery and a window was created in the mesentery distal to the enterotomy.  The bowel was then transected with a linear MARCO stapler.  The mesentery was then taken down with a LigaSure device.  Specimen was then passed off the field for pathological examination.  Upon further investigation of the abdomen there is copious amounts of purulent fluid within the abdomen.  A four-quadrant inspection was performed.  There are multiple adhesions which were taken down with combination of blunt and sharp dissection.  The small bowel in the left upper quadrant was freely mobilized from the previous colocolonic anastomosis to allow for visualization and running of the bowel from the ligament of Treitz.  Once most proximal segment of the bowel was identified at the ligament of Treitz I then ran this distally.  There is no evidence of any serosal injuries or further enterotomies in the proximal jejunum.  The previously resected portion of small bowel was within the mid to distal jejunum.  The distal jejunum and proximal ileum was adherent in the right upper quadrant.  This is where the patient had undergone a previous open cholecystectomy.  Adhesio lysis was performed with sharp dissection.  There are multiple interloop adhesions which were taken down with sharp and blunt dissection.  With the small bowel taken down from the right upper quadrant was able to run the bowel distally on the previous resected small bowel to the terminal ileum.  The colon was then ran from the right colon up to the proximal transverse and the stapled anastomosis and then down the  sigmoid into the rectum.  There were 2 areas of serosal injury from adhesio lysis in the right upper quadrant.  These were repaired with 3-0 silk Lembert style sutures.  The abdomen was then copiously irrigated with normal saline.  All irrigation fluid was aspirated.  After multiple liters of irrigation the effluent was coming back clear.  Once again the bowel was ran proximally from the ligament of Treitz to the portion of resected small bowel and then distally to the terminal ileum.  There is no further evidence of any injury or bowel compromise.  At this time as the patient was requiring pressors and hemodynamically unstable decision was made to leave him in discontinuity and place a temporary abdominal closure.  A fenestrated drape was then placed over the bowel and blue towels were placed over the drape.  2 large-bore chest tubes were then placed over the blue towels down towards the pelvis.  Another blue towel was then placed over the chest tubes and a Ioban drape was then placed over top.  Chest tubes were then connected with Y connector and placed to suction.  A postoperative debrief was performed with the staff in the operating room.  The patient was then transferred to the ICU in critical condition.    Dr. Rodriges was utilized during the case for his expertise in retraction, tissue handling, and closure.

## 2019-06-01 NOTE — PLAN OF CARE
Vented, 100% V paced, w/sporadic AV pacing. BP's stable, titrated levophed to maintain MAP  >65. LS clear to coarse, w/dim bases, in-line suction continues to pull scant brown secretions. NG to LIS putting out 250 for shift. Abdominal suction to LIS putting out 250, draining at site, dressing reinforced. Meeks in place UOP marginal 455, cloudy/sediment/odorous orange urine. Restraints, oral cares/ suction, & repositioning q2h. IVF continues, ABX-Zosyn. Epidural in place, dressing unchanged t/o shift.     Fentanyl epidural 26.7mL remaining in cassette.     Face to face report given with opportunity to observe patient.    Report given to Carrie GUTIÉRREZ RN.     Jony Bettencourt   6/1/2019  7:30 PM

## 2019-06-01 NOTE — PROVIDER NOTIFICATION
DATE:  6/1/2019   TIME OF RECEIPT FROM LAB:  0455  LAB TEST:  Lactic Acid  LAB VALUE:  4.8  RESULTS GIVEN WITH READ-BACK TO (PROVIDER): Dr. Miller  TIME LAB VALUE REPORTED TO PROVIDER:   0456

## 2019-06-01 NOTE — PLAN OF CARE
Pt remains on vent with out change to settings. He remains sedate and unable to communicate secondary to sedation. Remains on propofol gtt at 40 mcg , fentanyl gtt at 25 mcg/ hr and Epidural without change. Pt 's BP's have been labile, requiring Levophed gtt at 0.16 mcgs/kg/hr and has received 2 liter fluid bolus's so far. His art line is positional at times. Urine output has picked up and remains cloudy and kendal.   Lactic acid result at 0100 was 5.2 up from 4.3. T has been afebrile. PT has bee 100% v-paced and had occasional episodes of being dual paced. Am labs ordered.

## 2019-06-01 NOTE — PROVIDER NOTIFICATION
DATE:  6/1/2019   TIME OF RECEIPT FROM LAB:  0120  LAB TEST:  Lalctic  LAB VALUE:  5.2  RESULTS GIVEN WITH READ-BACK TO (PROVIDER):  Dr Miller  TIME LAB VALUE REPORTED TO PROVIDER:   0122    Dr Miller here ordered 1 liter LR bolus.

## 2019-06-02 ENCOUNTER — ANESTHESIA EVENT (OUTPATIENT)
Dept: SURGERY | Facility: HOSPITAL | Age: 79
DRG: 907 | End: 2019-06-02
Payer: MEDICARE

## 2019-06-02 ENCOUNTER — APPOINTMENT (OUTPATIENT)
Dept: GENERAL RADIOLOGY | Facility: HOSPITAL | Age: 79
DRG: 907 | End: 2019-06-02
Attending: SURGERY
Payer: MEDICARE

## 2019-06-02 ENCOUNTER — APPOINTMENT (OUTPATIENT)
Dept: GENERAL RADIOLOGY | Facility: HOSPITAL | Age: 79
DRG: 907 | End: 2019-06-02
Attending: NURSE PRACTITIONER
Payer: MEDICARE

## 2019-06-02 ENCOUNTER — ANESTHESIA (OUTPATIENT)
Dept: SURGERY | Facility: HOSPITAL | Age: 79
DRG: 907 | End: 2019-06-02
Payer: MEDICARE

## 2019-06-02 LAB
ALBUMIN SERPL-MCNC: 1.7 G/DL (ref 3.4–5)
ALP SERPL-CCNC: 43 U/L (ref 40–150)
ALT SERPL W P-5'-P-CCNC: 10 U/L (ref 0–70)
ANION GAP SERPL CALCULATED.3IONS-SCNC: 7 MMOL/L (ref 3–14)
AST SERPL W P-5'-P-CCNC: 12 U/L (ref 0–45)
BASE EXCESS BLDA CALC-SCNC: 3.2 MMOL/L
BILIRUB SERPL-MCNC: 1.2 MG/DL (ref 0.2–1.3)
BUN SERPL-MCNC: 26 MG/DL (ref 7–30)
CALCIUM SERPL-MCNC: 7.6 MG/DL (ref 8.5–10.1)
CHLORIDE SERPL-SCNC: 106 MMOL/L (ref 94–109)
CK SERPL-CCNC: 45 U/L (ref 30–300)
CO2 SERPL-SCNC: 26 MMOL/L (ref 20–32)
CREAT SERPL-MCNC: 1.02 MG/DL (ref 0.66–1.25)
CRP SERPL-MCNC: 262 MG/L (ref 0–8)
ERYTHROCYTE [DISTWIDTH] IN BLOOD BY AUTOMATED COUNT: 16.7 % (ref 10–15)
GFR SERPL CREATININE-BSD FRML MDRD: 69 ML/MIN/{1.73_M2}
GLUCOSE BLDC GLUCOMTR-MCNC: 129 MG/DL (ref 70–99)
GLUCOSE BLDC GLUCOMTR-MCNC: 145 MG/DL (ref 70–99)
GLUCOSE BLDC GLUCOMTR-MCNC: 148 MG/DL (ref 70–99)
GLUCOSE BLDC GLUCOMTR-MCNC: 171 MG/DL (ref 70–99)
GLUCOSE SERPL-MCNC: 185 MG/DL (ref 70–99)
HCO3 BLD-SCNC: 27 MMOL/L (ref 21–28)
HCT VFR BLD AUTO: 33.3 % (ref 40–53)
HGB BLD-MCNC: 11.3 G/DL (ref 13.3–17.7)
LACTATE BLD-SCNC: 1.9 MMOL/L (ref 0.7–2)
LACTATE BLD-SCNC: 2 MMOL/L (ref 0.7–2)
MCH RBC QN AUTO: 31.1 PG (ref 26.5–33)
MCHC RBC AUTO-ENTMCNC: 33.9 G/DL (ref 31.5–36.5)
MCV RBC AUTO: 92 FL (ref 78–100)
O2/TOTAL GAS SETTING VFR VENT: NORMAL %
OXYHGB MFR BLD: 97 % (ref 92–100)
PCO2 BLD: 39 MM HG (ref 35–45)
PH BLD: 7.45 PH (ref 7.35–7.45)
PLATELET # BLD AUTO: 175 10E9/L (ref 150–450)
PO2 BLD: 82 MM HG (ref 80–105)
POTASSIUM SERPL-SCNC: 4 MMOL/L (ref 3.4–5.3)
PROCALCITONIN SERPL-MCNC: 34.29 NG/ML
PROT SERPL-MCNC: 4.9 G/DL (ref 6.8–8.8)
RBC # BLD AUTO: 3.63 10E12/L (ref 4.4–5.9)
SODIUM SERPL-SCNC: 139 MMOL/L (ref 133–144)
TRIGL SERPL-MCNC: 201 MG/DL
WBC # BLD AUTO: 9.1 10E9/L (ref 4–11)

## 2019-06-02 PROCEDURE — 36000058 ZZH SURGERY LEVEL 3 EA 15 ADDTL MIN: Performed by: SURGERY

## 2019-06-02 PROCEDURE — 25000128 H RX IP 250 OP 636: Performed by: NURSE PRACTITIONER

## 2019-06-02 PROCEDURE — 82805 BLOOD GASES W/O2 SATURATION: CPT | Performed by: NURSE PRACTITIONER

## 2019-06-02 PROCEDURE — 25800030 ZZH RX IP 258 OP 636

## 2019-06-02 PROCEDURE — 83605 ASSAY OF LACTIC ACID: CPT | Performed by: HOSPITALIST

## 2019-06-02 PROCEDURE — 25000128 H RX IP 250 OP 636: Performed by: NURSE ANESTHETIST, CERTIFIED REGISTERED

## 2019-06-02 PROCEDURE — 36415 COLL VENOUS BLD VENIPUNCTURE: CPT | Performed by: HOSPITALIST

## 2019-06-02 PROCEDURE — 27110038 ZZH RX 271

## 2019-06-02 PROCEDURE — 37000009 ZZH ANESTHESIA TECHNICAL FEE, EACH ADDTL 15 MIN: Performed by: SURGERY

## 2019-06-02 PROCEDURE — 44120 REMOVAL OF SMALL INTESTINE: CPT | Performed by: NURSE ANESTHETIST, CERTIFIED REGISTERED

## 2019-06-02 PROCEDURE — 37000008 ZZH ANESTHESIA TECHNICAL FEE, 1ST 30 MIN: Performed by: SURGERY

## 2019-06-02 PROCEDURE — 25000566 ZZH SEVOFLURANE, EA 15 MIN: Performed by: NURSE ANESTHETIST, CERTIFIED REGISTERED

## 2019-06-02 PROCEDURE — 44120 REMOVAL OF SMALL INTESTINE: CPT | Mod: 58 | Performed by: SURGERY

## 2019-06-02 PROCEDURE — 27110038 ZZH RX 271: Performed by: NURSE ANESTHETIST, CERTIFIED REGISTERED

## 2019-06-02 PROCEDURE — 99233 SBSQ HOSP IP/OBS HIGH 50: CPT | Performed by: NURSE PRACTITIONER

## 2019-06-02 PROCEDURE — 71000015 ZZH RECOVERY PHASE 1 LEVEL 2 EA ADDTL HR: Performed by: SURGERY

## 2019-06-02 PROCEDURE — 25000128 H RX IP 250 OP 636

## 2019-06-02 PROCEDURE — 84145 PROCALCITONIN (PCT): CPT | Performed by: NURSE PRACTITIONER

## 2019-06-02 PROCEDURE — 27110028 ZZH OR GENERAL SUPPLY NON-STERILE: Performed by: SURGERY

## 2019-06-02 PROCEDURE — 94003 VENT MGMT INPAT SUBQ DAY: CPT

## 2019-06-02 PROCEDURE — 82550 ASSAY OF CK (CPK): CPT | Performed by: NURSE PRACTITIONER

## 2019-06-02 PROCEDURE — 25000128 H RX IP 250 OP 636: Performed by: SURGERY

## 2019-06-02 PROCEDURE — 99100 ANES PT EXTEME AGE<1 YR&>70: CPT | Performed by: NURSE ANESTHETIST, CERTIFIED REGISTERED

## 2019-06-02 PROCEDURE — 25000125 ZZHC RX 250: Performed by: NURSE ANESTHETIST, CERTIFIED REGISTERED

## 2019-06-02 PROCEDURE — 25800030 ZZH RX IP 258 OP 636: Performed by: NURSE ANESTHETIST, CERTIFIED REGISTERED

## 2019-06-02 PROCEDURE — 25800030 ZZH RX IP 258 OP 636: Performed by: SURGERY

## 2019-06-02 PROCEDURE — C9113 INJ PANTOPRAZOLE SODIUM, VIA: HCPCS | Performed by: SURGERY

## 2019-06-02 PROCEDURE — 40000275 ZZH STATISTIC RCP TIME EA 10 MIN

## 2019-06-02 PROCEDURE — 27210794 ZZH OR GENERAL SUPPLY STERILE: Performed by: SURGERY

## 2019-06-02 PROCEDURE — 84478 ASSAY OF TRIGLYCERIDES: CPT | Performed by: NURSE PRACTITIONER

## 2019-06-02 PROCEDURE — 0D1A0ZA BYPASS JEJUNUM TO JEJUNUM, OPEN APPROACH: ICD-10-PCS | Performed by: SURGERY

## 2019-06-02 PROCEDURE — 36600 WITHDRAWAL OF ARTERIAL BLOOD: CPT

## 2019-06-02 PROCEDURE — 71045 X-RAY EXAM CHEST 1 VIEW: CPT | Mod: TC

## 2019-06-02 PROCEDURE — 36000056 ZZH SURGERY LEVEL 3 1ST 30 MIN: Performed by: SURGERY

## 2019-06-02 PROCEDURE — 80053 COMPREHEN METABOLIC PANEL: CPT | Performed by: NURSE PRACTITIONER

## 2019-06-02 PROCEDURE — 85027 COMPLETE CBC AUTOMATED: CPT | Performed by: NURSE PRACTITIONER

## 2019-06-02 PROCEDURE — 74018 RADEX ABDOMEN 1 VIEW: CPT | Mod: TC

## 2019-06-02 PROCEDURE — 71000014 ZZH RECOVERY PHASE 1 LEVEL 2 FIRST HR: Performed by: SURGERY

## 2019-06-02 PROCEDURE — 88307 TISSUE EXAM BY PATHOLOGIST: CPT | Mod: TC | Performed by: SURGERY

## 2019-06-02 PROCEDURE — 00000146 ZZHCL STATISTIC GLUCOSE BY METER IP

## 2019-06-02 PROCEDURE — 86140 C-REACTIVE PROTEIN: CPT | Performed by: NURSE PRACTITIONER

## 2019-06-02 PROCEDURE — 20000003 ZZH R&B ICU

## 2019-06-02 RX ORDER — LIDOCAINE 40 MG/G
CREAM TOPICAL
Status: DISCONTINUED | OUTPATIENT
Start: 2019-06-02 | End: 2019-06-11 | Stop reason: HOSPADM

## 2019-06-02 RX ORDER — NEOSTIGMINE METHYLSULFATE 1 MG/ML
VIAL (ML) INJECTION PRN
Status: DISCONTINUED | OUTPATIENT
Start: 2019-06-02 | End: 2019-06-02

## 2019-06-02 RX ORDER — DIPHENHYDRAMINE HCL 12.5MG/5ML
12.5 LIQUID (ML) ORAL EVERY 6 HOURS PRN
Status: DISCONTINUED | OUTPATIENT
Start: 2019-06-02 | End: 2019-06-11 | Stop reason: HOSPADM

## 2019-06-02 RX ORDER — HEPARIN SODIUM,PORCINE 10 UNIT/ML
5-10 VIAL (ML) INTRAVENOUS
Status: DISCONTINUED | OUTPATIENT
Start: 2019-06-02 | End: 2019-06-05

## 2019-06-02 RX ORDER — KETAMINE HYDROCHLORIDE 10 MG/ML
INJECTION INTRAMUSCULAR; INTRAVENOUS PRN
Status: DISCONTINUED | OUTPATIENT
Start: 2019-06-02 | End: 2019-06-02

## 2019-06-02 RX ORDER — SODIUM CHLORIDE, SODIUM LACTATE, POTASSIUM CHLORIDE, CALCIUM CHLORIDE 600; 310; 30; 20 MG/100ML; MG/100ML; MG/100ML; MG/100ML
INJECTION, SOLUTION INTRAVENOUS CONTINUOUS
Status: DISCONTINUED | OUTPATIENT
Start: 2019-06-02 | End: 2019-06-03

## 2019-06-02 RX ORDER — FENTANYL CITRATE 50 UG/ML
25-50 INJECTION, SOLUTION INTRAMUSCULAR; INTRAVENOUS
Status: DISCONTINUED | OUTPATIENT
Start: 2019-06-02 | End: 2019-06-11 | Stop reason: HOSPADM

## 2019-06-02 RX ORDER — ONDANSETRON 2 MG/ML
4 INJECTION INTRAMUSCULAR; INTRAVENOUS EVERY 6 HOURS PRN
Status: DISCONTINUED | OUTPATIENT
Start: 2019-06-02 | End: 2019-06-11 | Stop reason: HOSPADM

## 2019-06-02 RX ORDER — SODIUM CHLORIDE, SODIUM LACTATE, POTASSIUM CHLORIDE, CALCIUM CHLORIDE 600; 310; 30; 20 MG/100ML; MG/100ML; MG/100ML; MG/100ML
INJECTION, SOLUTION INTRAVENOUS CONTINUOUS
Status: DISCONTINUED | OUTPATIENT
Start: 2019-06-02 | End: 2019-06-02

## 2019-06-02 RX ORDER — FENTANYL CITRATE 50 UG/ML
INJECTION, SOLUTION INTRAMUSCULAR; INTRAVENOUS PRN
Status: DISCONTINUED | OUTPATIENT
Start: 2019-06-02 | End: 2019-06-02

## 2019-06-02 RX ORDER — ONDANSETRON 2 MG/ML
INJECTION INTRAMUSCULAR; INTRAVENOUS PRN
Status: DISCONTINUED | OUTPATIENT
Start: 2019-06-02 | End: 2019-06-02

## 2019-06-02 RX ORDER — LIDOCAINE 40 MG/G
CREAM TOPICAL
Status: DISCONTINUED | OUTPATIENT
Start: 2019-06-02 | End: 2019-06-08

## 2019-06-02 RX ORDER — NALOXONE HYDROCHLORIDE 0.4 MG/ML
.1-.4 INJECTION, SOLUTION INTRAMUSCULAR; INTRAVENOUS; SUBCUTANEOUS
Status: DISCONTINUED | OUTPATIENT
Start: 2019-06-02 | End: 2019-06-11 | Stop reason: HOSPADM

## 2019-06-02 RX ORDER — GLYCOPYRROLATE 0.2 MG/ML
INJECTION, SOLUTION INTRAMUSCULAR; INTRAVENOUS PRN
Status: DISCONTINUED | OUTPATIENT
Start: 2019-06-02 | End: 2019-06-02

## 2019-06-02 RX ORDER — DEXAMETHASONE SODIUM PHOSPHATE 10 MG/ML
INJECTION, SOLUTION INTRAMUSCULAR; INTRAVENOUS PRN
Status: DISCONTINUED | OUTPATIENT
Start: 2019-06-02 | End: 2019-06-02

## 2019-06-02 RX ORDER — ONDANSETRON 4 MG/1
4 TABLET, ORALLY DISINTEGRATING ORAL EVERY 6 HOURS PRN
Status: DISCONTINUED | OUTPATIENT
Start: 2019-06-02 | End: 2019-06-11 | Stop reason: HOSPADM

## 2019-06-02 RX ORDER — HEPARIN SODIUM,PORCINE 10 UNIT/ML
5-10 VIAL (ML) INTRAVENOUS EVERY 24 HOURS
Status: DISCONTINUED | OUTPATIENT
Start: 2019-06-02 | End: 2019-06-07

## 2019-06-02 RX ADMIN — Medication 4 MG: at 10:44

## 2019-06-02 RX ADMIN — ONDANSETRON 4 MG: 2 INJECTION INTRAMUSCULAR; INTRAVENOUS at 09:03

## 2019-06-02 RX ADMIN — FENTANYL CITRATE 25 MCG: 50 INJECTION, SOLUTION INTRAMUSCULAR; INTRAVENOUS at 09:53

## 2019-06-02 RX ADMIN — TAZOBACTAM SODIUM AND PIPERACILLIN SODIUM 4.5 G: 500; 4 INJECTION, SOLUTION INTRAVENOUS at 04:16

## 2019-06-02 RX ADMIN — PROPOFOL 35 MCG/KG/MIN: 10 INJECTION, EMULSION INTRAVENOUS at 03:31

## 2019-06-02 RX ADMIN — SODIUM CHLORIDE, POTASSIUM CHLORIDE, SODIUM LACTATE AND CALCIUM CHLORIDE: 600; 310; 30; 20 INJECTION, SOLUTION INTRAVENOUS at 10:06

## 2019-06-02 RX ADMIN — ROCURONIUM BROMIDE 10 MG: 10 INJECTION INTRAVENOUS at 10:17

## 2019-06-02 RX ADMIN — DEXAMETHASONE SODIUM PHOSPHATE 10 MG: 10 INJECTION, SOLUTION INTRAMUSCULAR; INTRAVENOUS at 09:03

## 2019-06-02 RX ADMIN — FENTANYL CITRATE 25 MCG: 50 INJECTION, SOLUTION INTRAMUSCULAR; INTRAVENOUS at 09:29

## 2019-06-02 RX ADMIN — CHLORHEXIDINE GLUCONATE ORAL RINSE 15 ML: 1.2 SOLUTION DENTAL at 07:27

## 2019-06-02 RX ADMIN — CEFOXITIN SODIUM 2 G: 2 INJECTION, SOLUTION INTRAVENOUS at 09:12

## 2019-06-02 RX ADMIN — Medication 8 ML/HR: at 22:50

## 2019-06-02 RX ADMIN — PANTOPRAZOLE SODIUM 40 MG: 40 INJECTION, POWDER, FOR SOLUTION INTRAVENOUS at 12:42

## 2019-06-02 RX ADMIN — PROPOFOL 30 MG: 10 INJECTION, EMULSION INTRAVENOUS at 08:43

## 2019-06-02 RX ADMIN — SODIUM CHLORIDE, POTASSIUM CHLORIDE, SODIUM LACTATE AND CALCIUM CHLORIDE: 600; 310; 30; 20 INJECTION, SOLUTION INTRAVENOUS at 05:22

## 2019-06-02 RX ADMIN — SODIUM CHLORIDE, POTASSIUM CHLORIDE, SODIUM LACTATE AND CALCIUM CHLORIDE: 600; 310; 30; 20 INJECTION, SOLUTION INTRAVENOUS at 15:28

## 2019-06-02 RX ADMIN — PROPOFOL 50 MG: 10 INJECTION, EMULSION INTRAVENOUS at 10:20

## 2019-06-02 RX ADMIN — Medication 3 ML/HR: at 03:19

## 2019-06-02 RX ADMIN — SODIUM CHLORIDE, POTASSIUM CHLORIDE, SODIUM LACTATE AND CALCIUM CHLORIDE: 600; 310; 30; 20 INJECTION, SOLUTION INTRAVENOUS at 23:02

## 2019-06-02 RX ADMIN — FENTANYL CITRATE 50 MCG: 50 INJECTION INTRAMUSCULAR; INTRAVENOUS at 23:20

## 2019-06-02 RX ADMIN — TAZOBACTAM SODIUM AND PIPERACILLIN SODIUM 4.5 G: 500; 4 INJECTION, SOLUTION INTRAVENOUS at 12:46

## 2019-06-02 RX ADMIN — TAZOBACTAM SODIUM AND PIPERACILLIN SODIUM 4.5 G: 500; 4 INJECTION, SOLUTION INTRAVENOUS at 18:33

## 2019-06-02 RX ADMIN — FENTANYL CITRATE 25 MCG: 50 INJECTION INTRAMUSCULAR; INTRAVENOUS at 21:50

## 2019-06-02 RX ADMIN — ROCURONIUM BROMIDE 20 MG: 10 INJECTION INTRAVENOUS at 09:07

## 2019-06-02 RX ADMIN — PANTOPRAZOLE SODIUM 40 MG: 40 INJECTION, POWDER, FOR SOLUTION INTRAVENOUS at 20:47

## 2019-06-02 RX ADMIN — KETAMINE HYDROCHLORIDE 30 MG: 10 INJECTION, SOLUTION INTRAMUSCULAR; INTRAVENOUS at 09:02

## 2019-06-02 RX ADMIN — GLYCOPYRROLATE 0.25 MG: 0.2 INJECTION, SOLUTION INTRAMUSCULAR; INTRAVENOUS at 10:44

## 2019-06-02 RX ADMIN — ROCURONIUM BROMIDE 10 MG: 10 INJECTION INTRAVENOUS at 09:53

## 2019-06-02 RX ADMIN — SODIUM CHLORIDE, POTASSIUM CHLORIDE, SODIUM LACTATE AND CALCIUM CHLORIDE: 600; 310; 30; 20 INJECTION, SOLUTION INTRAVENOUS at 12:47

## 2019-06-02 RX ADMIN — FENTANYL CITRATE 50 MCG: 50 INJECTION, SOLUTION INTRAMUSCULAR; INTRAVENOUS at 09:34

## 2019-06-02 RX ADMIN — PROPOFOL 30 MG: 10 INJECTION, EMULSION INTRAVENOUS at 08:49

## 2019-06-02 RX ADMIN — ROCURONIUM BROMIDE 10 MG: 10 INJECTION INTRAVENOUS at 09:42

## 2019-06-02 RX ADMIN — FENTANYL CITRATE 75 MCG: 50 INJECTION, SOLUTION INTRAMUSCULAR; INTRAVENOUS at 10:50

## 2019-06-02 ASSESSMENT — ENCOUNTER SYMPTOMS: DYSRHYTHMIAS: 1

## 2019-06-02 ASSESSMENT — MIFFLIN-ST. JEOR: SCORE: 1773.88

## 2019-06-02 ASSESSMENT — ACTIVITIES OF DAILY LIVING (ADL)
ADLS_ACUITY_SCORE: 16

## 2019-06-02 ASSESSMENT — LIFESTYLE VARIABLES: TOBACCO_USE: 1

## 2019-06-02 NOTE — PLAN OF CARE
Entered rm to find pt had pulled out NG tube independently. Old NG tubing occluded at several ports at distal end of tube. New tubing inserted to 60cm (same landmark as previous tubing) at rt nare w/o difficulty, pt tolerated procedure well. Air injected and auscultated in abdomen, returned to LIS and quickly saw return of green/brown gastric fluid.

## 2019-06-02 NOTE — ANESTHESIA POSTPROCEDURE EVALUATION
Patient: Ag Perez    Procedure(s):  LAPAROTOMY, EXPLORATORY, POSSIBLE CLOSURE    Diagnosis:open abdomen  Diagnosis Additional Information: No value filed.    Anesthesia Type:  General, Epidural    Note:  Anesthesia Post Evaluation    Patient location during evaluation: PACU  Patient participation: Able to fully participate in evaluation  Level of consciousness: lethargic  Pain management: adequate  Airway patency: patent  Cardiovascular status: acceptable  Respiratory status: acceptable  Hydration status: acceptable  PONV: none             Last vitals:  Vitals:    06/02/19 1110 06/02/19 1115 06/02/19 1120   BP: 133/68 129/69 136/71   Pulse:      Resp: 19 (!) 29 (!) 30   Temp:      SpO2: 98% 97% 96%         Electronically Signed By: JIN Rebollar CRNA  June 2, 2019  11:29 AM

## 2019-06-02 NOTE — PROGRESS NOTES
[facility] General Surgery Progress Note         Assessment and Plan:    Assessment:   78 yo male POD #2 s/p exploratory laparotomy from small bowel perforation with fascial dehiscence      Plan:   Neuro: continue Fentanyl IV and will keep EDC, propofol for sedation  Cardiac: Norepi infusion continues to decrease, currently at 0.06, will turn off  Pulm: continue Vent management with AC , Chest Xray stable, ABG improved this morning, may try to extubate pending results of operation this morning  GI: NPO, abdominal dressing in place with serosanguinous fluid   : Continue to monitor UOP with khalil, Kidney function normalized  FEN: Continue fluid resuscitation with lactated ringers, electrolytes stable, no parenteral nutrition needed at this point, Total IVF rate ~200/hr   Prophylaxis: SCDs, Lovenox daily  Heme/ID: continue Zosyn 4.5 q6 for perforated small bowel, sepsis    Will proceed to the OR for abdominal washout and closure    Additional problems to be followed by medicine team           Interval History:   The patient remained sedated and intubated overnight. His pressor requirements have gone down throughout the night. No acute events overnight          Significant Problems:    Active Problems:    Sepsis (H)    Chronic atrial fibrillation (H)    Malignant neoplasm of transverse colon (H)    Small bowel perforation (H)    Cardiac pacemaker in situ            Review of Systems:    The patient denies any chest pain, shortness of breath, excessive pain, fever, chills, purulent drainage from the wound, nausea or vomiting.         Medications:     All medications related to the patient's surgery have been reviewed  Current Facility-Administered Medications   Medication     cefOXitin (MEFOXIN) 2 g in dextrose 50 mL pre-mix intermittent infusion     chlorhexidine (PERIDEX) 0.12 % solution 15 mL     glucose gel 15-30 g    Or     dextrose 50 % injection 25-50 mL    Or     glucagon injection 1 mg      enoxaparin (LOVENOX) injection 40 mg     fentaNYL (PF) (SUBLIMAZE) 2,500 mcg in D5W 100 mL infusion     fentaNYL (SUBLIMAZE) 2 mcg/mL, bupivacaine (MARCAINE) 0.125% in  mL PCEA infusion     insulin aspart (NovoLOG) inj (RAPID ACTING)     ipratropium - albuterol 0.5 mg/2.5 mg/3 mL (DUONEB) neb solution 3 mL     lactated ringers BOLUS 1,000 mL     lactated ringers infusion     medication instruction     medication instruction     nalbuphine (NUBAIN) injection 2.5-5 mg     naloxone (NARCAN) injection 0.1-0.4 mg     naloxone (NARCAN) injection 0.1-0.4 mg     norepinephrine (LEVOPHED) 16 mg in sodium chloride 0.9 % 250 mL infusion     ondansetron (ZOFRAN) injection 4 mg     pantoprazole (PROTONIX) 40 mg IV push injection     piperacillin-tazobactam (ZOSYN) intermittent infusion 4.5 g     propofol (DIPRIVAN) infusion    And     propofol (DIPRIVAN) injection 10 mg/mL vial     Provider ordered ALTERNATE pre op antibiotic.     sodium chloride (PF) 0.9% PF flush 3 mL     sodium chloride (PF) 0.9% PF flush 3 mL             Physical Exam:     Vitals were reviewed  Patient Vitals for the past 8 hrs:   BP Temp Temp src Heart Rate Resp SpO2 Weight   06/02/19 0740 -- -- -- -- -- 97 % --   06/02/19 0719 -- 98.9  F (37.2  C) Tympanic -- -- 98 % --   06/02/19 0700 112/61 -- -- 60 13 -- --   06/02/19 0600 107/56 -- -- 60 17 -- --   06/02/19 0549 -- -- -- -- -- 97 % --   06/02/19 0500 100/59 -- -- 60 16 97 % 114.2 kg (251 lb 12.3 oz)   06/02/19 0430 102/57 -- -- 60 14 97 % --   06/02/19 0415 117/59 -- -- 60 15 96 % --   06/02/19 0400 108/63 98  F (36.7  C) Tympanic 60 13 97 % --   06/02/19 0345 113/56 -- -- 60 14 97 % --   06/02/19 0336 -- -- -- -- -- 98 % --   06/02/19 0330 110/62 -- -- 60 13 98 % --   06/02/19 0315 -- -- -- -- 16 98 % --   06/02/19 0300 (!) 109/49 -- -- 60 (!) 28 98 % --   06/02/19 0245 111/55 -- -- 60 13 97 % --   06/02/19 0230 106/50 -- -- 60 -- 97 % --   06/02/19 0215 132/55 -- -- 60 -- 97 % --   06/02/19  0200 125/62 -- -- 60 12 97 % --   06/02/19 0145 130/57 -- -- 60 17 97 % --   06/02/19 0130 121/58 -- -- 60 -- 97 % --   06/02/19 0100 124/57 -- -- 60 15 98 % --       General:  Sedated and intubated  Chest:   Clear breath sound bilaterally, no wheeze  Cardiac:  Paced regular rythym  Abdomen:  Open abdominal dressing in place with serosanguinous output, soft, non-distended  Extremities:  Pitting edema to bilateral lower extremities    # Pain Assessment:  Current Pain Score 6/2/2019   Patient currently in pain? -   Pain score (0-10) -   Pain location -   Pain descriptors -   rFLACC pain score 0            Data:   All laboratory data related to this surgery reviewed  Results for orders placed or performed during the hospital encounter of 05/30/19 (from the past 24 hour(s))   Lactic acid whole blood   Result Value Ref Range    Lactic Acid 4.1 (HH) 0.7 - 2.0 mmol/L   Glucose by meter   Result Value Ref Range    Glucose 185 (H) 70 - 99 mg/dL   Lactic acid whole blood   Result Value Ref Range    Lactic Acid 3.9 (H) 0.7 - 2.0 mmol/L   Procalcitonin   Result Value Ref Range    Procalcitonin 37.13 (HH) ng/ml   Glucose by meter   Result Value Ref Range    Glucose 151 (H) 70 - 99 mg/dL   Lactic acid whole blood   Result Value Ref Range    Lactic Acid 2.5 (H) 0.7 - 2.0 mmol/L   Glucose by meter   Result Value Ref Range    Glucose 154 (H) 70 - 99 mg/dL   Lactic acid whole blood   Result Value Ref Range    Lactic Acid 2.0 0.7 - 2.0 mmol/L   Glucose by meter   Result Value Ref Range    Glucose 148 (H) 70 - 99 mg/dL   Lactic acid whole blood   Result Value Ref Range    Lactic Acid 1.9 0.7 - 2.0 mmol/L   Lactic acid whole blood   Result Value Ref Range    Lactic Acid 2.0 0.7 - 2.0 mmol/L   CBC with platelets   Result Value Ref Range    WBC 9.1 4.0 - 11.0 10e9/L    RBC Count 3.63 (L) 4.4 - 5.9 10e12/L    Hemoglobin 11.3 (L) 13.3 - 17.7 g/dL    Hematocrit 33.3 (L) 40.0 - 53.0 %    MCV 92 78 - 100 fl    MCH 31.1 26.5 - 33.0 pg    MCHC  33.9 31.5 - 36.5 g/dL    RDW 16.7 (H) 10.0 - 15.0 %    Platelet Count 175 150 - 450 10e9/L   CRP inflammation   Result Value Ref Range    CRP Inflammation 262.0 (H) 0.0 - 8.0 mg/L   Comprehensive metabolic panel   Result Value Ref Range    Sodium 139 133 - 144 mmol/L    Potassium 4.0 3.4 - 5.3 mmol/L    Chloride 106 94 - 109 mmol/L    Carbon Dioxide 26 20 - 32 mmol/L    Anion Gap 7 3 - 14 mmol/L    Glucose 185 (H) 70 - 99 mg/dL    Urea Nitrogen 26 7 - 30 mg/dL    Creatinine 1.02 0.66 - 1.25 mg/dL    GFR Estimate 69 >60 mL/min/[1.73_m2]    GFR Estimate If Black 79 >60 mL/min/[1.73_m2]    Calcium 7.6 (L) 8.5 - 10.1 mg/dL    Bilirubin Total 1.2 0.2 - 1.3 mg/dL    Albumin 1.7 (L) 3.4 - 5.0 g/dL    Protein Total 4.9 (L) 6.8 - 8.8 g/dL    Alkaline Phosphatase 43 40 - 150 U/L    ALT 10 0 - 70 U/L    AST 12 0 - 45 U/L   Procalcitonin   Result Value Ref Range    Procalcitonin 34.29 (HH) ng/ml   CK total   Result Value Ref Range    CK Total 45 30 - 300 U/L   Triglycerides   Result Value Ref Range    Triglycerides 201 (H) <150 mg/dL   Blood gas arterial and oxyhgb   Result Value Ref Range    pH Arterial 7.45 7.35 - 7.45 pH    pCO2 Arterial 39 35 - 45 mm Hg    pO2 Arterial 82 80 - 105 mm Hg    Bicarbonate Arterial 27 21 - 28 mmol/L    FIO2 40%     Oxyhemoglobin Arterial 97 92 - 100 %    Base Excess Art 3.2 mmol/L   XR Chest Port 1 View    Narrative    PROCEDURE:  XR CHEST PORT 1 VW    HISTORY:  VENT,NGT,central line, possible aspiration prior to  intubation.     COMPARISON:  6/1/2019    FINDINGS:   Endotracheal tube and right-sided central line are in stable position.  The cardiac silhouette is stable in size. There are increasing  airspace opacities in the left lung base. There is a small left  pleural effusion. Right basal atelectasis is unchanged. Left-sided  cardiac device is stable.      Impression    IMPRESSION:    1. Worsening left basilar atelectasis or infiltrate and small left  pleural effusion.  2. Stable support  lines and tubes.      MD Pastor BEASLEY MD

## 2019-06-02 NOTE — PLAN OF CARE
DATE:  6/2/2019   TIME OF RECEIPT FROM LAB:  0602  LAB TEST:  Procalcitonin  LAB VALUE: 34.29  RESULTS GIVEN TO PROVIDER:  Kenisha Rolle NP  TIME LAB VALUE REPORTED TO PROVIDER:   0606

## 2019-06-02 NOTE — PROGRESS NOTES
No nebs given this shift. Breath sounds mostly clear    Patient remains on ventilator with settings at:  Mode: AC  RR: 12  VT: 600ml  FIO2: 50%  PEEP: 5  ET tube secured at: 24 Size: 8  Cuff checked: Yes   Breath sounds: clear  SpO2: 96%  Suction: scant amount dark orally  Weaning trial attempted: No   Ambu bag present: Yes

## 2019-06-02 NOTE — PLAN OF CARE
Reason for Restraint being discontinued: to OR  Time Restraint discontinued: 0830  Care Plan : Yes  Supervisor aware.

## 2019-06-02 NOTE — PLAN OF CARE
Pt remains on vent with decrease oxygen demand. His FIO2 is currently at 40% AC 12,  and Peep of 5. His propofol has been weaned down to 30 mcg /kg/min/18.4 ml/hr. Fentanyl gtt is unchanged at 25mcg/hr or 1ml/hr and he has his fentanyl epidural infusing a 3 ml per continuous rate.He grimaces with q 2 hr turns. He has been prepped preoperatively for OR in am per orders with am ABX on call pre-procedure.His abd dressing has lost some of it occlusive suction and has been reinforced at the bottom edge for moderate amt of brwn drainage. The bifurcated abd sx remains at LIS but the amt of drainage into the canister has diminished. His belly remains soft and BS are quiet. His NGT remains patent , was flushed with 30 ml free water and the amt of brown green return has diminished also. His levophed gtt is currently at 0.08 mcg/ kg/min  which is unchanged from previous shift although it was titrated up to 0.1 mcgs for a short time during the night. His urine output has been adequate but remains cldy and slightly orange tinged. He is afebrile and his lactic acid is currenlty 1.9 with am labs ordered. Will continue plan of care. Q 4 hour accuchecks have not required coverage.

## 2019-06-02 NOTE — PROGRESS NOTES
No nebs given this shift. Breath sounds fairly clear. ETT position changed Q2.    Patient remains on ventilator with settings at:  Mode: AC  RR: 12  VT: 600ml  FIO2: weaned to 40%  PEEP: 5  ET tube secured at: 24 Size: 8  Cuff checked: Yes   Breath sounds: Fairly clear  SpO2: 97%  Suction: small amount thick cloudy orally  Weaning trial attempted:  No  Ambu bag present: Yes

## 2019-06-02 NOTE — PROGRESS NOTES
[facility]    Hospitalist Progress Note    Date of Service (when I saw the patient): 06/02/2019    Assessment & Plan        Small bowel perforation (H): Presented with abdominal pain. 5/31/19 morning demonstrated acute abdomen on assessment. Dr. Vilchis was notified and he took him to OR urgently. Please see operative note.   -6/1/19-POD#1 Remains sedated,ventilated, abdomen open. NGT copious black returns, ETT minimal secretions of same color. Abdominal wound open with suction dressing with moderate amounts serosanguinous output.   -6/2/19-POD#2 Planning on second look today. Stable overnight with decreasing NGT output, abdomen soft. UO stable overnight.      Mechanical ventilation: Vent post-op due to severe sepsis with respiratory compromise, open abdomen and plan to return to OR tomorrow for second look. First ABG on arrival to floor showed mixed acidosis-corrected with increase TV from 500 to 600. No known underlying chronic lung disease. CXR shows ETT in satisfactory position. ABG looks great. Today cxr shows left lower lobe infiltrate but minimal. Small effusion as well. Ventilator management done in collaboration with Dr. Vilchis.       Metabolic and respiratory acidosis: Resolved. Initial vent settings , rate 12 (breathing 16-20) and PEEP 5. Increased tidal volume to 550 then 600.       Severe Sepsis (H): Improving. Due to acute peritonitis from small bowel perforation. Toxic appearing on first appearance with severe pain. Given 3 liter fluid bolus, started on IV Zosyn. Lactic acid was 4. To OR urgently for source control. IN pre-op patient developed hypoxia, worsened tachypnea, hypotension. He was started on neosynephrine gtt in the OR.  He continues to require processors, switched over to levophed on arrival to the floor, today he is requiring minimal doses and much less labile. Lactic acid continues to be elevated but likely a combination of factors including increased work of  breathing, large open abdominal wound. Will continue to monitor periodically but all use Procalcitonin and CRP to evaluate response. Continue IVF boluses for decreased urine output, increased levophed needs etc.      AILEEN: Due to severe sepsis with end organ damage. Urine output decent through case but started dropping off post-operatively. Creatinine bumped from 1.08 to 1.29 then 1.59. With levophed MAP has been kept consistently >65. Renal function back to normal already 6/1. Likely 3rd spacing fluid to abdomen and will continue to require further fluid boluses.       Chronic atrial fibrillation (H): Has a pacemaker, has been on Xarelto in the past but was taken off of it due to GI bleeding.       Malignant neoplasm of transverse colon (H): S/P resection 2 months ago at [facility]. He did not have any immediate complications during that hospital stay. He is following-up with oncology.       Cardiac pacemaker in situ      DVT Prophylaxis: Enoxaparin (Lovenox) subcutaneous  Code Status: DNR    Disposition: Expected discharge in several days once stablized.    Kenisha Rolle, CNP    Interval History   Remains sedated, in no acute distress. He does cough with suctioning.     -Data reviewed today: I reviewed all new labs and imaging results over the last 24 hours.     Physical Exam   Temp: 98.9  F (37.2  C) Temp src: Tympanic BP: 112/61 Pulse: 67 Heart Rate: 60 Resp: 13 SpO2: 97 % O2 Device: Mechanical Ventilator    Vitals:    05/31/19 0523 06/01/19 0540 06/02/19 0500   Weight: 102.5 kg (225 lb 15.5 oz) 109.3 kg (240 lb 15.4 oz) 114.2 kg (251 lb 12.3 oz)     Vital Signs with Ranges  Temp:  [98  F (36.7  C)-99.5  F (37.5  C)] 98.9  F (37.2  C)  Pulse:  [65-67] 67  Heart Rate:  [60-78] 60  Resp:  [9-28] 13  BP: ()/(44-66) 112/61  FiO2 (%):  [40 %-50 %] 40 %  SpO2:  [93 %-99 %] 97 %  I/O last 3 completed shifts:  In: 6068.92 [I.V.:6008.92; NG/GT:60]  Out: 1895 [Urine:970; Emesis/NG output:325;  Drains:600]    Peripheral IV 07/24/15 Right Hand (Active)   Number of days: 1409       CVC Triple Lumen 05/31/19 Right (Active)   Site Assessment WDL 6/2/2019  7:35 AM   Dressing Intervention Chlorhexidine sponge;Transparent 6/2/2019  7:35 AM   Dressing Change Due 06/07/19 5/31/2019  8:00 PM   Lumen A - Color BROWN 6/2/2019  7:35 AM   Lumen A - Status saline locked 6/2/2019  7:35 AM   Lumen A - Cap Change Due 06/01/19 6/1/2019  4:00 AM   Lumen B - Color BLUE 6/2/2019  7:35 AM   Lumen B - Status infusing 6/2/2019  7:35 AM   Lumen B - Cap Change Due 06/07/19 5/31/2019  2:30 PM   Lumen C - Color WHITE 6/2/2019  7:35 AM   Lumen C - Status infusing 6/2/2019  7:35 AM   Lumen C - Cap Change Due 06/07/19 5/31/2019  2:30 PM   Number of days: 2       Intrathecal/Epidural Catheter 05/31/19 (Active)   Site Assessment Other (Comment) 6/2/2019  7:35 AM   Line Status Infusing 6/2/2019  7:35 AM   Dressing Type Gauze;Transparent 6/2/2019  7:35 AM   Dressing Status Drainage on dressing 6/2/2019  7:35 AM   Number of days: 2       ETT (adult) 8 (Active)   Secured By Commercial tube salazar 6/2/2019  7:35 AM   Site Appearance Clean and dry 6/2/2019  7:35 AM   Secured at (cm) to lip 24 cm 6/2/2019  7:40 AM   Site of ET Tube Securement At lip 6/2/2019  7:40 AM   Cuff Pressure - Type other (see comments) 6/2/2019  5:49 AM   Cuff Pressure - cm H2O 26 cmH20 6/2/2019  5:49 AM   Tube Care/Reposition repositioned tube center of mouth 6/2/2019  7:40 AM   Bite Block Secure and Patent 6/2/2019  7:35 AM   Safety Measures manual resuscitator at bedside;trach supplies at bedside per guideline (pediatrics) 6/2/2019  7:35 AM   Number of days: 2       Open Drain Medial Abdomen 32 Serbian CASSETTE DRAPE CUT TO SIZE BY MD, 2 TOWELS, 2 CHEST TUBES WITH Y CONNECTOR ATTACHED TO FLORES BAG TO DRAIN (Active)   Site Description WDL X;Leaking at site 6/2/2019  7:34 AM   Dressing Status Dressing Reinforced 6/2/2019  7:34 AM   Drainage Appearance Brown;Yellow  6/2/2019  7:34 AM   Status Other (Comment) 6/2/2019  7:34 AM   Output (ml) 0 ml 6/2/2019  4:00 AM   Number of days: 2       NG/OG Tube Nasogastric 16 fr Left mouth (Active)   Site Description WDL 6/2/2019  7:34 AM   Status Suction-low intermittent 6/2/2019  7:34 AM   Drainage Appearance Brown;Green 6/2/2019  7:34 AM   [place] (cm marking) at nare/mouth 60 cm 6/2/2019  7:34 AM   Flush/Free Water (mL) 30 mL 6/2/2019  4:00 AM   Output (ml) 0 ml 6/2/2019 12:00 AM   Number of days: 2       Urethral Catheter Coude 16 fr (Active)   Tube Description Positional 6/2/2019  7:34 AM   Catheter Care Done 6/2/2019  7:34 AM   Collection Container Standard 6/2/2019  7:34 AM   Securement Method Securing device (Describe) 6/2/2019  7:34 AM   Rationale for Continued Use Strict 1-2 Hour I&O 6/2/2019  7:34 AM   Urine Output 110 mL 6/2/2019  7:34 AM   Number of days: 2       Incision/Surgical Site 05/31/19 Abdomen (Active)   Incision Assessment WDL except;Edges not approximated;Moist;Drainage 6/2/2019  7:35 AM   Magaly-Incision Assessment Erythema 6/2/2019  7:35 AM   Closure Other (Comment) 6/2/2019  7:35 AM   Incision Drainage Amount Moderate 6/2/2019  7:35 AM   Drainage Description Brown 6/2/2019  7:35 AM   Incision Care Barrier Film 6/2/2019  7:35 AM   Dressing Intervention Dressing reinforced 6/2/2019  7:35 AM   Number of days: 2     Line/device assessment(s) completed for medical necessity    Constitutional: Sedated, pale, ill appearing.   Respiratory:Mildly diminished bilaterally otherwise quite clear. Breathing well on vent, peak pressures look good, breathing over set rate.  Cardiovascular: HRR, paced on telemetry. No peripheral edema. Cap refill is brisk, fingers and toes are cool to touch.   GI: Much softer today, wound open with sponge suction dressing overlay. Thin bloody drainage with suction.   Skin/Integumen: Pale, no rashes or open areas.       Medications     fentaNYL (SUBLIMAZE) 2500 mcg in 100 mL infusion 25 mcg/hr  (06/01/19 0553)     lactated ringers 150 mL/hr at 06/02/19 0522     - MEDICATION INSTRUCTIONS -       - MEDICATION INSTRUCTIONS -       norepinephrine 0.06 mcg/kg/min (06/02/19 0430)     propofol (DIPRIVAN) infusion 25 mcg/kg/min (06/02/19 0527)     Another Antibiotic has been ordered.         cefOXitin  2 g Intravenous 30 Min Pre-Op     chlorhexidine  15 mL Mouth/Throat Q12H     enoxaparin  40 mg Subcutaneous Q24H     fentaNYL (SUBLIMAZE) 2 mcg/mL, bupivacaine (MARCAINE) 0.125% in  mL PCEA infusion   EPIDURAL PCA     insulin aspart  1-6 Units Subcutaneous Q4H     lactated ringers  1,000 mL Intravenous Once     pantoprazole (PROTONIX) IV  40 mg Intravenous BID     piperacillin-tazobactam  4.5 g Intravenous Q6H     sodium chloride (PF)  3 mL Intracatheter Q8H       Data   Recent Labs   Lab 06/02/19  0458 06/01/19  0449 06/01/19  0108 05/31/19  2055 05/31/19  1759 05/31/19  1507  05/31/19  0742 05/30/19  2315   WBC 9.1 11.9*  --   --   --  5.4  --  11.9* 11.9*   HGB 11.3* 13.7  --   --   --  15.5  --  16.4 14.4   MCV 92 91  --   --   --  93  --  89 89    213  --   --   --  260  --  261 256   INR  --   --   --   --   --   --   --  1.16 1.05    141  --   --  142  --    < > 140 140   POTASSIUM 4.0 4.1  --   --  3.4  --    < > 3.9 3.7   CHLORIDE 106 108  --   --  108  --    < > 107 104   CO2 26 23  --   --  22  --    < > 21 25   BUN 26 29  --   --  32*  --    < > 28 26   CR 1.02 1.19  --   --  1.53*  --    < > 1.08 1.09   ANIONGAP 7 10  --   --  12  --    < > 12 11   ERIKA 7.6* 7.8*  --   --  7.7*  --    < > 8.4* 8.9   * 237*  --   --  216*  --    < > 229* 170*   ALBUMIN 1.7* 2.0*  --   --   --   --   --   --   --    PROTTOTAL 4.9* 5.0*  --   --   --   --   --   --   --    BILITOTAL 1.2 1.8*  --   --   --   --   --   --   --    ALKPHOS 43 44  --   --   --   --   --   --   --    ALT 10 14  --   --   --   --   --   --   --    AST 12 17  --   --   --   --   --   --   --    TROPI  --   --  0.066*  0.073* 0.084*  --   --   --   --     < > = values in this interval not displayed.       Recent Results (from the past 24 hour(s))   XR Chest Port 1 View    Narrative    PROCEDURE:  XR CHEST PORT 1 VW    HISTORY:  VENT,NGT,central line, possible aspiration prior to  intubation.     COMPARISON:  6/1/2019    FINDINGS:   Endotracheal tube and right-sided central line are in stable position.  The cardiac silhouette is stable in size. There are increasing  airspace opacities in the left lung base. There is a small left  pleural effusion. Right basal atelectasis is unchanged. Left-sided  cardiac device is stable.      Impression    IMPRESSION:    1. Worsening left basilar atelectasis or infiltrate and small left  pleural effusion.  2. Stable support lines and tubes.      AVERY WOOD MD

## 2019-06-02 NOTE — PLAN OF CARE
Spoke with daughter Renetta per phone regarding the need for consent for surgery. She will be here before 0800 with her brother to speak with Dr. Vilchis and sign consent for surgery for their father.

## 2019-06-02 NOTE — PROGRESS NOTES
Talked with son and dtr in law this morning. They confirmed that his usual pharmacy is [facility] and provided his street address ([address] [place] [address]). They request that if SNF is needed that a referral be sent to .

## 2019-06-02 NOTE — PLAN OF CARE
Face to face report given with opportunity to observe patient.  Report given to Jony MCCAULEY.    Carrie Crandall  6/2/2019, 7:20 AM

## 2019-06-02 NOTE — OR NURSING
Pt continues to state no to pain, has very weak whisper, follow simple commands but still very lethargic, family now at bedside. Epidural infusing but pt unable to accurately respond to dermatone questions

## 2019-06-02 NOTE — OR NURSING
"Pt into ICU for post anesthesia recovery, pt initally very lethargic and not respodning, but now following simple commands, when asked if he has pain, able to say \"no\", pt has very weak cough, lungs coarse,  "

## 2019-06-02 NOTE — PLAN OF CARE
BP decreased to 86/44 increased levophed gtt to 0.1. Dr Neumann aware. Pt responded BP increased 121/58.

## 2019-06-02 NOTE — PLAN OF CARE
Alert but remains confused, will follow commands most of the time. VSS, tele reading 100% V paced, w/intermittent AV pacing rate 60-90, 2LPM NC, sat above 90%, denies pain t/o shift, wincing only during repositioning but quickly settles once motion is stopped. LS CTA w/dim bases. Capnography continually occluding, removed d/t ineffectiveness. NG putting out 150. Abd dressing w/scant shadowing marked, unchanged. JOSE drains in place putting out total of 45 to #1 and 60 to #2 serosanguinous drainage. Meeks in place putting out 715 yellow urine. Voice hoarse and quiet. Epidural in place, dermatomes difficult to assess d/t pt repeating same word or phrase each time asked, even if the question asked requires a different response. IVF continues to blue port, white & brown ports SL'd flushed several times this shift w/o difficulty. Call light w/in reach, not using appropriately.     Face to face report given with opportunity to observe patient.    Report given to Carrie GUTIÉRREZ RN.     Jony Bettencourt   6/2/2019  7:21 PM

## 2019-06-02 NOTE — PROVIDER NOTIFICATION
Dr Neumann here aware of urine out put : 40 ml over 2 hours and additional 60 over 45 min. No new orders.

## 2019-06-02 NOTE — ANESTHESIA PREPROCEDURE EVALUATION
Anesthesia Pre-Procedure Evaluation    Patient: Ag Perez   MRN: [id number] : 1940          Preoperative Diagnosis: INCARCERATED INCISIONAL HERNIA    Procedure(s):  EXPLORATORY LAPAROTOMY    Past Medical History:   Diagnosis Date     Arrhythmia      Hypertension      Pacemaker      Past Surgical History:   Procedure Laterality Date     CARDIAC SURGERY       COLONOSCOPY       COLONOSCOPY N/A 2015    Procedure: COLONOSCOPY;  Surgeon: Kameron De Santiago MD;  Location: [facility]       Anesthesia Evaluation     .             ROS/MED HX    ENT/Pulmonary:     (+)KYRIE risk factors hypertension, obese, tobacco use, Past use , . .    Neurologic:  - neg neurologic ROS     Cardiovascular:     (+) hypertension----. Taking blood thinners : . . . pacemaker :. dysrhythmias a-fib, .       METS/Exercise Tolerance:     Hematologic:  - neg hematologic  ROS       Musculoskeletal:   (+) arthritis,  -       GI/Hepatic: Comment: Colon CA with hemicolectomy 3-12-19    (+) Other GI/Hepatic       Renal/Genitourinary: Comment: History of bladder CA        Endo:     (+) Obesity, .      Psychiatric:  - neg psychiatric ROS       Infectious Disease:  - neg infectious disease ROS       Malignancy:   (+) Malignancy History of Other  Other CA status post Surgery         Other:    - neg other ROS                        Physical Exam      Airway   Mallampati: II  TM distance: <3 FB  Neck ROM: limited    Dental     Cardiovascular     PE comment: AV paced    Pulmonary   PE comment: Clear in upper lobes, diminished in middle and lower lobes    Other findings: On vent        Lab Results   Component Value Date    WBC 9.1 2019    HGB 11.3 (L) 2019    HCT 33.3 (L) 2019     2019    .0 (H) 2019     2019    POTASSIUM 4.0 2019    CHLORIDE 106 2019    CO2 26 2019    BUN 26 2019    CR 1.02 2019     (H) 2019    ERIKA 7.6 (L) 2019    PHOS 3.0 2019     "MAG 1.7 06/01/2019    ALBUMIN 1.7 (L) 06/02/2019    PROTTOTAL 4.9 (L) 06/02/2019    ALT 10 06/02/2019    AST 12 06/02/2019    ALKPHOS 43 06/02/2019    BILITOTAL 1.2 06/02/2019    INR 1.16 05/31/2019    TSH 0.25 (L) 05/31/2019    T4 1.25 05/31/2019       Preop Vitals  BP Readings from Last 3 Encounters:   06/02/19 112/61   07/24/15 152/76    Pulse Readings from Last 3 Encounters:   06/01/19 67   07/24/15 68      Resp Readings from Last 3 Encounters:   06/02/19 13   07/24/15 16    SpO2 Readings from Last 3 Encounters:   06/02/19 97%   07/24/15 94%      Temp Readings from Last 1 Encounters:   06/02/19 98.9  F (37.2  C) (Tympanic)    Ht Readings from Last 1 Encounters:   05/31/19 1.651 m (5' 5\")      Wt Readings from Last 1 Encounters:   06/02/19 114.2 kg (251 lb 12.3 oz)    Estimated body mass index is 41.9 kg/m  as calculated from the following:    Height as of 5/31/19: 1.651 m (5' 5\").    Weight as of an earlier encounter on 6/2/19: 114.2 kg (251 lb 12.3 oz).       Anesthesia Plan      History & Physical Review  History and physical reviewed and following examination; no interval change.    ASA Status:  3 .    NPO Status:  > 8 hours    Plan for General and Epidural with Intravenous and Propofol induction. Maintenance will be Balanced.    PONV prophylaxis:  Ondansetron (or other 5HT-3) and Dexamethasone or Solumedrol       Postoperative Care  Postoperative pain management:  IV analgesics and Neuraxial analgesia.      Consents  Anesthetic plan, risks, benefits and alternatives discussed with:  Patient and Daughter/Son.  Use of blood products discussed: No .   .                   JIN Rebollar CRNA  "

## 2019-06-02 NOTE — BRIEF OP NOTE
[facility]    Brief Operative Note    Pre-operative diagnosis: open abdomen  Post-operative diagnosis Small bowel perforation with bowel in discontinuity/open abdomen  Procedure: Procedure(s):  EXPLORATORY LAPAROTOMY, SMALL BOWEL RESECTION-JEJUNUM, SMALL BOWEL ANASTOMOSIS, ABDOMINAL WASH OUT, FASCIAL CLOSURE  Surgeon: Surgeon(s) and Role:     * Pastor Vilchis MD - Primary  Anesthesia: General   Estimated blood loss: Minimal  Drains:  10 flat JOSE drains x2  Specimens:   ID Type Source Tests Collected by Time Destination   A : small bowel- jejunum Tissue Other SURGICAL PATHOLOGY EXAM Pastor Vilchis MD 6/2/2019  9:38 AM      Findings:   Intra-abdominally no evidence of further infection, abscess, enteric contents. Small bowel anastomosis performed and fascial closure.  Complications: None.  Implants:  * No implants in log *

## 2019-06-02 NOTE — ANESTHESIA CARE TRANSFER NOTE
Patient: Ag Perez    Procedure(s):  LAPAROTOMY, EXPLORATORY, POSSIBLE CLOSURE    Diagnosis: open abdomen  Diagnosis Additional Information: No value filed.    Anesthesia Type:   General, Epidural     Note:  Airway :Face Mask  Patient transferred to:PACU  Handoff Report: Identifed the Patient, Identified the Reponsible Provider, Reviewed the pertinent medical history, Discussed the surgical course, Reviewed Intra-OP anesthesia mangement and issues during anesthesia, Set expectations for post-procedure period and Allowed opportunity for questions and acknowledgement of understanding      Vitals: (Last set prior to Anesthesia Care Transfer)    CRNA VITALS  6/2/2019 1030 - 6/2/2019 1127      6/2/2019             Pulse:  63    Ht Rate:  62    SpO2:  100 %    Resp Rate (observed):  3  (Abnormal)     Resp Rate (set):  8                Electronically Signed By: JIN Rebollar CRNA  June 2, 2019  11:27 AM

## 2019-06-03 ENCOUNTER — APPOINTMENT (OUTPATIENT)
Dept: PHYSICAL THERAPY | Facility: HOSPITAL | Age: 79
DRG: 907 | End: 2019-06-03
Payer: MEDICARE

## 2019-06-03 ENCOUNTER — APPOINTMENT (OUTPATIENT)
Dept: OCCUPATIONAL THERAPY | Facility: HOSPITAL | Age: 79
DRG: 907 | End: 2019-06-03
Attending: SURGERY
Payer: MEDICARE

## 2019-06-03 LAB
ALBUMIN SERPL-MCNC: 1.6 G/DL (ref 3.4–5)
ALP SERPL-CCNC: 53 U/L (ref 40–150)
ALT SERPL W P-5'-P-CCNC: 12 U/L (ref 0–70)
ANION GAP SERPL CALCULATED.3IONS-SCNC: 5 MMOL/L (ref 3–14)
AST SERPL W P-5'-P-CCNC: 16 U/L (ref 0–45)
BASOPHILS # BLD AUTO: 0 10E9/L (ref 0–0.2)
BASOPHILS NFR BLD AUTO: 0.2 %
BILIRUB SERPL-MCNC: 1 MG/DL (ref 0.2–1.3)
BUN SERPL-MCNC: 28 MG/DL (ref 7–30)
CALCIUM SERPL-MCNC: 7.6 MG/DL (ref 8.5–10.1)
CHLORIDE SERPL-SCNC: 107 MMOL/L (ref 94–109)
CO2 SERPL-SCNC: 28 MMOL/L (ref 20–32)
CREAT SERPL-MCNC: 0.96 MG/DL (ref 0.66–1.25)
CRP SERPL-MCNC: 188 MG/L (ref 0–8)
DIFFERENTIAL METHOD BLD: ABNORMAL
EOSINOPHIL # BLD AUTO: 0 10E9/L (ref 0–0.7)
EOSINOPHIL NFR BLD AUTO: 0 %
ERYTHROCYTE [DISTWIDTH] IN BLOOD BY AUTOMATED COUNT: 16.5 % (ref 10–15)
GFR SERPL CREATININE-BSD FRML MDRD: 74 ML/MIN/{1.73_M2}
GLUCOSE BLDC GLUCOMTR-MCNC: 149 MG/DL (ref 70–99)
GLUCOSE BLDC GLUCOMTR-MCNC: 155 MG/DL (ref 70–99)
GLUCOSE BLDC GLUCOMTR-MCNC: 158 MG/DL (ref 70–99)
GLUCOSE BLDC GLUCOMTR-MCNC: 174 MG/DL (ref 70–99)
GLUCOSE BLDC GLUCOMTR-MCNC: 191 MG/DL (ref 70–99)
GLUCOSE SERPL-MCNC: 166 MG/DL (ref 70–99)
HCT VFR BLD AUTO: 31.1 % (ref 40–53)
HGB BLD-MCNC: 10.4 G/DL (ref 13.3–17.7)
IMM GRANULOCYTES # BLD: 0.1 10E9/L (ref 0–0.4)
IMM GRANULOCYTES NFR BLD: 0.7 %
LYMPHOCYTES # BLD AUTO: 0.9 10E9/L (ref 0.8–5.3)
LYMPHOCYTES NFR BLD AUTO: 8.4 %
MAGNESIUM SERPL-MCNC: 2.5 MG/DL (ref 1.6–2.3)
MCH RBC QN AUTO: 30.7 PG (ref 26.5–33)
MCHC RBC AUTO-ENTMCNC: 33.4 G/DL (ref 31.5–36.5)
MCV RBC AUTO: 92 FL (ref 78–100)
MONOCYTES # BLD AUTO: 0.6 10E9/L (ref 0–1.3)
MONOCYTES NFR BLD AUTO: 5.1 %
NEUTROPHILS # BLD AUTO: 9.2 10E9/L (ref 1.6–8.3)
NEUTROPHILS NFR BLD AUTO: 85.6 %
NRBC # BLD AUTO: 0 10*3/UL
NRBC BLD AUTO-RTO: 0 /100
PLATELET # BLD AUTO: 167 10E9/L (ref 150–450)
POTASSIUM SERPL-SCNC: 4.3 MMOL/L (ref 3.4–5.3)
PROCALCITONIN SERPL-MCNC: 22.13 NG/ML
PROT SERPL-MCNC: 4.9 G/DL (ref 6.8–8.8)
RBC # BLD AUTO: 3.39 10E12/L (ref 4.4–5.9)
SODIUM SERPL-SCNC: 140 MMOL/L (ref 133–144)
WBC # BLD AUTO: 10.7 10E9/L (ref 4–11)

## 2019-06-03 PROCEDURE — 25000128 H RX IP 250 OP 636: Performed by: SURGERY

## 2019-06-03 PROCEDURE — 25000131 ZZH RX MED GY IP 250 OP 636 PS 637: Performed by: SURGERY

## 2019-06-03 PROCEDURE — 97530 THERAPEUTIC ACTIVITIES: CPT | Mod: GP

## 2019-06-03 PROCEDURE — 00000146 ZZHCL STATISTIC GLUCOSE BY METER IP

## 2019-06-03 PROCEDURE — 80053 COMPREHEN METABOLIC PANEL: CPT | Performed by: SURGERY

## 2019-06-03 PROCEDURE — 97110 THERAPEUTIC EXERCISES: CPT | Mod: GP

## 2019-06-03 PROCEDURE — 01996 DLY HOSP MGMT EDRL RX ADMIN: CPT | Performed by: NURSE ANESTHETIST, CERTIFIED REGISTERED

## 2019-06-03 PROCEDURE — 25800030 ZZH RX IP 258 OP 636: Performed by: SURGERY

## 2019-06-03 PROCEDURE — 84145 PROCALCITONIN (PCT): CPT | Performed by: SURGERY

## 2019-06-03 PROCEDURE — 20000003 ZZH R&B ICU

## 2019-06-03 PROCEDURE — C9113 INJ PANTOPRAZOLE SODIUM, VIA: HCPCS | Performed by: SURGERY

## 2019-06-03 PROCEDURE — 86140 C-REACTIVE PROTEIN: CPT | Performed by: SURGERY

## 2019-06-03 PROCEDURE — 83735 ASSAY OF MAGNESIUM: CPT | Performed by: SURGERY

## 2019-06-03 PROCEDURE — 36415 COLL VENOUS BLD VENIPUNCTURE: CPT | Performed by: SURGERY

## 2019-06-03 PROCEDURE — 85025 COMPLETE CBC W/AUTO DIFF WBC: CPT | Performed by: SURGERY

## 2019-06-03 PROCEDURE — 99232 SBSQ HOSP IP/OBS MODERATE 35: CPT | Performed by: NURSE PRACTITIONER

## 2019-06-03 PROCEDURE — 97161 PT EVAL LOW COMPLEX 20 MIN: CPT | Mod: GP

## 2019-06-03 PROCEDURE — 97165 OT EVAL LOW COMPLEX 30 MIN: CPT | Mod: GO

## 2019-06-03 PROCEDURE — 85049 AUTOMATED PLATELET COUNT: CPT | Performed by: SURGERY

## 2019-06-03 RX ORDER — ATORVASTATIN CALCIUM 10 MG/1
10 TABLET, FILM COATED ORAL EVERY EVENING
COMMUNITY

## 2019-06-03 RX ORDER — TAMSULOSIN HYDROCHLORIDE 0.4 MG/1
0.4 CAPSULE ORAL EVERY EVENING
COMMUNITY

## 2019-06-03 RX ORDER — FERROUS SULFATE 325(65) MG
325 TABLET ORAL
COMMUNITY

## 2019-06-03 RX ORDER — DEXTROSE MONOHYDRATE, SODIUM CHLORIDE, AND POTASSIUM CHLORIDE 50; 1.49; 4.5 G/1000ML; G/1000ML; G/1000ML
INJECTION, SOLUTION INTRAVENOUS CONTINUOUS
Status: DISCONTINUED | OUTPATIENT
Start: 2019-06-03 | End: 2019-06-06

## 2019-06-03 RX ADMIN — POTASSIUM CHLORIDE, DEXTROSE MONOHYDRATE AND SODIUM CHLORIDE: 150; 5; 450 INJECTION, SOLUTION INTRAVENOUS at 10:08

## 2019-06-03 RX ADMIN — ENOXAPARIN SODIUM 40 MG: 40 INJECTION SUBCUTANEOUS at 05:35

## 2019-06-03 RX ADMIN — PANTOPRAZOLE SODIUM 40 MG: 40 INJECTION, POWDER, FOR SOLUTION INTRAVENOUS at 09:14

## 2019-06-03 RX ADMIN — TAZOBACTAM SODIUM AND PIPERACILLIN SODIUM 4.5 G: 500; 4 INJECTION, SOLUTION INTRAVENOUS at 13:24

## 2019-06-03 RX ADMIN — PANTOPRAZOLE SODIUM 40 MG: 40 INJECTION, POWDER, FOR SOLUTION INTRAVENOUS at 20:16

## 2019-06-03 RX ADMIN — FENTANYL CITRATE 50 MCG: 50 INJECTION INTRAMUSCULAR; INTRAVENOUS at 21:35

## 2019-06-03 RX ADMIN — FENTANYL CITRATE 50 MCG: 50 INJECTION INTRAMUSCULAR; INTRAVENOUS at 02:04

## 2019-06-03 RX ADMIN — INSULIN ASPART 2 UNITS: 100 INJECTION, SOLUTION INTRAVENOUS; SUBCUTANEOUS at 13:33

## 2019-06-03 RX ADMIN — TAZOBACTAM SODIUM AND PIPERACILLIN SODIUM 4.5 G: 500; 4 INJECTION, SOLUTION INTRAVENOUS at 01:08

## 2019-06-03 RX ADMIN — TAZOBACTAM SODIUM AND PIPERACILLIN SODIUM 4.5 G: 500; 4 INJECTION, SOLUTION INTRAVENOUS at 19:05

## 2019-06-03 RX ADMIN — TAZOBACTAM SODIUM AND PIPERACILLIN SODIUM 4.5 G: 500; 4 INJECTION, SOLUTION INTRAVENOUS at 06:26

## 2019-06-03 RX ADMIN — POTASSIUM CHLORIDE, DEXTROSE MONOHYDRATE AND SODIUM CHLORIDE: 150; 5; 450 INJECTION, SOLUTION INTRAVENOUS at 18:09

## 2019-06-03 ASSESSMENT — ACTIVITIES OF DAILY LIVING (ADL)
ADLS_ACUITY_SCORE: 13
ADLS_ACUITY_SCORE: 16
ADLS_ACUITY_SCORE: 13
ADLS_ACUITY_SCORE: 16
ADLS_ACUITY_SCORE: 9.5
ADLS_ACUITY_SCORE: 16

## 2019-06-03 ASSESSMENT — MIFFLIN-ST. JEOR: SCORE: 1773.88

## 2019-06-03 NOTE — PROGRESS NOTES
Inpatient Occupational Therapy Evaluation    Name: Ag Perez MRN# [id number]   Age: 79 year old YOB: 1940     Date of Consultation: Mishel 3, 2019  Consultation is requested by:  Dr. Vilchis  Specific Consultation Request: Post operative evaluation for home safety and ADLs  Primary care provider: Ary Montanez    General Information:   Onset Date: 19    History of Current Problem/Admission: SBO (small bowel obstruction) (H) [K56.609]  Small bowel perforation (H) [K63.1]    Prior Level of Function: Pt previously independent in ADLs; did not use an assistive device for mobility. Pt reports he was previously very active.   Ambulation: 0 - Independent   Transferrin - Independent   Toiletin - Independent    Bathin - Independent   Dressin - Independent   Eatin - Independent   Communication: 0 - Understands/communicates without difficulty  Swallowin - swallows foods/liquids without difficulty  Cognition: 0 - no cognitive issues reported    Fall history within the last 6 months: No    Current Living Situation: Pt lives at home alone. 6-7 steps to the deck. Once inside, pt has no steps to manage. Information was obtained from son.     Current Equipment Used at Home: None     Patient & Family Goals: Pt did not verbalize. Discharge plans depend on pt's progress throughout the week.     Past Medical History:   Past Medical History:   Diagnosis Date     Arrhythmia      Hypertension      Pacemaker        Past Surgical History:  Past Surgical History:   Procedure Laterality Date     CARDIAC SURGERY       COLONOSCOPY       COLONOSCOPY N/A 2015    Procedure: COLONOSCOPY;  Surgeon: Kameron De Santiago MD;  Location: [facility]       Medications:   Current Facility-Administered Medications   Medication     dextrose 5% and 0.45% NaCl + KCl 20 mEq/L infusion     glucose gel 15-30 g    Or     dextrose 50 % injection 25-50 mL    Or     glucagon injection 1 mg     diphenhydrAMINE  (BENADRYL) solution 12.5 mg     enoxaparin (LOVENOX) injection 40 mg     fentaNYL (PF) (SUBLIMAZE) injection 25-50 mcg     fentaNYL (SUBLIMAZE) 2 mcg/mL, bupivacaine (MARCAINE) 0.125% in  mL PCEA infusion     heparin lock flush 10 UNIT/ML injection 5-10 mL     heparin lock flush 10 UNIT/ML injection 5-10 mL     insulin aspart (NovoLOG) inj (RAPID ACTING)     ipratropium - albuterol 0.5 mg/2.5 mg/3 mL (DUONEB) neb solution 3 mL     lidocaine (LMX4) kit     lidocaine (LMX4) kit     lidocaine 1 % 0.1-1 mL     lidocaine 1 % 0.1-1 mL     medication instruction     medication instruction     nalbuphine (NUBAIN) injection 2.5-5 mg     naloxone (NARCAN) injection 0.1-0.4 mg     naloxone (NARCAN) injection 0.1-0.4 mg     ondansetron (ZOFRAN-ODT) ODT tab 4 mg    Or     ondansetron (ZOFRAN) injection 4 mg     pantoprazole (PROTONIX) 40 mg IV push injection     piperacillin-tazobactam (ZOSYN) intermittent infusion 4.5 g     sodium chloride (PF) 0.9% PF flush 10 mL     sodium chloride (PF) 0.9% PF flush 10-20 mL     sodium chloride (PF) 0.9% PF flush 10-20 mL     Facility-Administered Medications Ordered in Other Encounters   Medication     fentaNYL 2 mcg/mL, bupivacaine 0.125% in NS BOLUS       Weight Bearing Status: N/A     Cognitive Status Examination:  Orientation: lethargic, confused and oriented to person and year; disoriented to place and month (was close, stated it was May)  Level of Consciousness: lethargic and confused  Follows Commands and Answers Questions: 75% of the time  Personal Safety and Judgement: Intact  Memory: Immediate recall impaired, Short-term memory impaired and Long-term memory intact  Comments: At this time, pt presents with some confusion and difficulties following commands and instructions, although he was pleasant and cooperative. Pt also stated he was in [place]. Likely cognition is affected by anesthesia.     Pain:   Currently in pain? No  Pain Location?   Pain Ratin (No  pain)    Occupational Therapy Evaluation:   Integumentary/Edema: Epidural in mid back  Range of Motion: BUE's WFL   Strength: MMT deferred due to surgery; generalized weakness and deconditioning present   Hand Strength: Functional   Muscle Tone Assessment: No deficits   Coordination: Impaired     Mobility:   Transfer Skills: MaxA x 2 sit<>stand from chair with maximal difficulties. Pt unable to remain standing, therefore was unable to take steps during evaluation. Pt also required verbal cues for transfers.   Toilet Transfer: Unable at this time. Catheter present   Balance: Impaired      ADLs:   Toileting: Catheter   Grooming: Set-up     IADLs:   Previous Responsibilities of the Patient: Meal Prep, Housekeeping, Laundry, Shopping, Medication Management, Finances  and Driving   Comments: Did not discuss yard work today      Activities of Daily Living Analysis:   Impairments Contributing to Impaired Activities of Daily Living: Balance impaired, Cognition impaired, coordination impaired, ROM decreased, strength decreased, and activity tolerance decreased  Comments:     Occupational Therapy Interventions: ADL Retraining , IADL retraining, balance retraining , bed mobility, cognition , ROM , strengthening , stretching , transfer training and progressive activity/exercise    Clinical Impressions:  Criteria for Skilled Therapeutic Intervention Met: Yes, treatment indicated  OT Diagnosis: Impaired ADLs  Influenced by the following impairments: Impaired balance, impaired cognition, impaired coordination, weakness, deconditioning   Functional limitations due to impairment: Functional mobility, toileting/transfers, dressing, grooming, bathing  Clinical presentation: Evolving/Changing  Clinical presentation rationale: Clinical judgement   Clinical Decision making (complexity): Low Complexity  Frequency: 5 times/week  Predicted Duration of Therapy Intervention (days/wks): 5 days  Anticipated Discharge Disposition: Home with Home  Therapy vs Transitional Care Facility   Anticipated Equipment Needs at Discharge:   Risks and Benefits of therapy have been explained: Yes  Patient, Family & other staff in agreement with plan of care: Yes  Clinical Impression Comments: Pt exhibits impaired ADLs due to generalized weakness and deconditioning, as well as impaired balance, cognition, and coordination. Pt will benefit from ongoing skilled OT intervention to address his deficits and increase his independence in ADLs/IADLs. Discharge recommendations are TBD as this time; either STR or home with home therapy depending on pt's progress throughout the week. Pt lives alone and although his family appears very involved, they work full time. Plan to treat during pt's stay.     Total Eval Time: 15

## 2019-06-03 NOTE — PLAN OF CARE
Face to face report given with opportunity to observe patient.    Report given Kwame Crandall   6/3/2019  7:30 AM

## 2019-06-03 NOTE — PROGRESS NOTES
[facility]    Hospitalist Progress Note    Date of Service (when I saw the patient): 06/03/2019    Assessment & Plan        Small bowel perforation (H): Presented with abdominal pain. 5/31/19 morning demonstrated acute abdomen on assessment. Dr. Vilchis was notified and he took him to OR urgently. Please see operative note.   -6/1/19-POD#1 Remains sedated,ventilated, abdomen open. NGT copious black returns, ETT minimal secretions of same color. Abdominal wound open with suction dressing with moderate amounts serosanguinous output.   -6/2/19-POD#2 Planning on second look today. Stable overnight with decreasing NGT output, abdomen soft. UO stable overnight.  -6/3/19- POD#3 small bowel resection. 6/2 he had wash out, facial closure. Please see operative note. He was extubated post op, taken off levohed and is doing extremely well. NGT in place. Pain minimal. No nausea. Epidural in place and running along with fentanyl IV PRN. UO and blood pressures have been excellent.      Mechanical ventilation: Vent post-op due to severe sepsis with respiratory compromise, open abdomen and plan to return to OR tomorrow for second look. First ABG on arrival to floor showed mixed acidosis-corrected with increase TV from 500 to 600. No known underlying chronic lung disease. Extubated post-op 6/2 and respiratory status has remained good. Weaned completely off oxygen today without issue.       Metabolic and respiratory acidosis: Resolved. While on mechanical ventilation. Initial vent settings , rate 12 (breathing 16-20) and PEEP 5. Increased tidal volume to 550 then 600 and acidosis resolved..       Severe Sepsis with septic shock (H): Resolved. Due to acute peritonitis from small bowel perforation. Toxic appearing on first appearance with severe pain. Given 3 liter fluid bolus, started on IV Zosyn. Lactic acid was 4. To OR urgently for source control. IN pre-op patient developed hypoxia, worsened tachypnea,  hypotension. He was started on neosynephrine gtt in the OR.  Levophed off post-op 6/2. Afebrile, normal WBC. Lactica wilbur normalized 6/2 as well.     AILEEN: Due to severe sepsis with end organ damage. Urine output decent through case but started dropping off post-operatively. Creatinine bumped from 1.08 to 1.29 then 1.59. With levophed MAP has been kept consistently >65. Renal function back to normal already 6/1.     Chronic atrial fibrillation (H): Has a pacemaker, has been on Xarelto in the past but was taken off of it due to GI bleeding.       Malignant neoplasm of transverse colon (H): S/P resection 2 months ago at [facility]. He did not have any immediate complications during that hospital stay. He is following-up with oncology.       Cardiac pacemaker in situ      DVT Prophylaxis: Enoxaparin (Lovenox) subcutaneous  Code Status: DNR    Disposition: Expected discharge in several days once stablized.    Kenisha Rolle, CNP    Interval History   Confused but easily reoriented. Denies any pain. No chest pain, dyspnea, nausea.      -Data reviewed today: I reviewed all new labs and imaging results over the last 24 hours.     Physical Exam   Temp: 98.7  F (37.1  C) Temp src: Tympanic BP: 147/67 Pulse: 70 Heart Rate: 64 Resp: 18 SpO2: 93 % O2 Device: (P) Nasal cannula Oxygen Delivery: (P) 1 LPM  Vitals:    06/01/19 0540 06/02/19 0500 06/03/19 0500   Weight: 109.3 kg (240 lb 15.4 oz) 114.2 kg (251 lb 12.3 oz) 114.2 kg (251 lb 12.3 oz)     Vital Signs with Ranges  Temp:  [98.6  F (37  C)-100  F (37.8  C)] 98.7  F (37.1  C)  Pulse:  [70] 70  Heart Rate:  [60-96] 64  Resp:  [0-28] 18  BP: ()/(50-77) 147/67  SpO2:  [90 %-98 %] 93 %  I/O last 3 completed shifts:  In: 3516.67 [I.V.:3486.67; NG/GT:30]  Out: 2435 [Urine:1605; Emesis/NG output:275; Drains:320; Other:225; Blood:10]    Peripheral IV 07/24/15 Right Hand (Active)   Number of days: 1410       CVC Triple Lumen 05/31/19 Right (Active)   Site Assessment  [facility] 6/3/2019 11:54 AM   Dressing Intervention Chlorhexidine sponge;Transparent 6/3/2019 11:54 AM   Dressing Change Due 06/07/19 5/31/2019  8:00 PM   Lumen A - Color BROWN 6/3/2019 11:54 AM   Lumen A - Status saline locked 6/3/2019 11:54 AM   Lumen A - Cap Change Due 06/01/19 6/1/2019  4:00 AM   Lumen B - Color BLUE 6/3/2019 11:54 AM   Lumen B - Status infusing 6/3/2019 11:54 AM   Lumen B - Cap Change Due 06/07/19 5/31/2019  2:30 PM   Lumen C - Color WHITE 6/3/2019 11:54 AM   Lumen C - Status saline locked 6/3/2019 11:54 AM   Lumen C - Cap Change Due 06/07/19 5/31/2019  2:30 PM   Number of days: 3       Intrathecal/Epidural Catheter 05/31/19 (Active)   Site Assessment Drainage 6/3/2019 11:54 AM   Line Status Infusing 6/3/2019 11:54 AM   Dressing Type Gauze;Transparent 6/3/2019 11:54 AM   Dressing Status Drainage on dressing 6/3/2019 11:54 AM   Number of days: 3       Closed/Suction Drain 1 Right Abdomen Bulb  (Active)   Site Description [facility] 6/3/2019 11:53 AM   Dressing Status Normal: Clean, Dry & Intact 6/3/2019 11:53 AM   Drainage Appearance Serosanguenous 6/3/2019 11:53 AM   Status To bulb suction 6/3/2019 11:53 AM   Output (ml) 15 ml 6/3/2019 11:53 AM   Number of days: 1       Closed/Suction Drain 2 Left Abdomen Bulb  (Active)   Site Description [facility] 6/3/2019 11:53 AM   Dressing Status Normal: Clean, Dry & Intact;Dressing Marked 6/3/2019 11:53 AM   Drainage Appearance Serosanguenous 6/3/2019 11:53 AM   Status To bulb suction 6/3/2019 11:53 AM   Output (ml) 20 ml 6/3/2019 11:53 AM   Number of days: 1       NG/OG Tube Nasogastric 16 fr Right nostril (Active)   Site Description [facility] 6/3/2019 11:53 AM   Status Suction-low intermittent 6/3/2019 11:53 AM   Drainage Appearance Green;Brown 6/3/2019 11:53 AM   Placement Check [place] unchanged 6/3/2019 11:53 AM   [place] (cm marking) at nare/mouth 60 cm 6/3/2019 11:53 AM   Output (ml) 100 ml 6/3/2019 11:53 AM   Number of days: 1       Urethral Catheter Coude 16 fr (Active)    Tube Description Positional 6/3/2019  8:00 AM   Catheter Care Done 6/3/2019  8:00 AM   Collection Container Standard 6/3/2019  8:00 AM   Securement Method Securing device (Describe) 6/3/2019  8:00 AM   Rationale for Continued Use Strict 1-2 Hour I&O 6/3/2019 10:00 AM   Urine Output 150 mL 6/3/2019 10:00 AM   Number of days: 3       Incision/Surgical Site 06/02/19 Abdomen (Active)   Incision Assessment UTV 6/3/2019  8:00 AM   Magaly-Incision Assessment UTV 6/3/2019  8:00 AM   Incision Drainage Amount UTV 6/3/2019  8:00 AM   Drainage Description Serosanguinous 6/3/2019  4:00 AM   Dressing Intervention Clean, dry, intact;Dressing marked - No change 6/3/2019  8:00 AM   Number of days: 1     Line/device assessment(s) completed for medical necessity    Constitutional: Sedated, pale, ill appearing.   Respiratory: Clear bilaterally. Strong cough. Minimal sputum production. No crackles, wheezes or rhonchi. Bit diminished in the bases.  Cardiovascular: HRR, paced on telemetry. No peripheral edema. Cap refill is brisk, fingers and toes are warm.  GI: Soft,bowel sounds absent, wound open to fascia, large dressing in place.   Skin/Integumen: Pale, no rashes or open areas.       Medications     dextrose 5% and 0.45% NaCl + KCl 20 mEq/L 125 mL/hr at 06/03/19 1008     - MEDICATION INSTRUCTIONS -       - MEDICATION INSTRUCTIONS -         enoxaparin  40 mg Subcutaneous Q24H     fentaNYL (SUBLIMAZE) 2 mcg/mL, bupivacaine (MARCAINE) 0.125% in  mL PCEA infusion   EPIDURAL PCA     heparin lock flush  5-10 mL Intracatheter Q24H     insulin aspart  1-6 Units Subcutaneous Q4H     pantoprazole (PROTONIX) IV  40 mg Intravenous BID     piperacillin-tazobactam  4.5 g Intravenous Q6H     sodium chloride (PF)  10 mL Intracatheter Q8H       Data   Recent Labs   Lab 06/03/19  0450 06/02/19  0458 06/01/19  0449  06/01/19  0108 05/31/19  2055 05/31/19  1759  05/31/19  0742 05/30/19  2315   WBC 10.7 9.1 11.9*  --   --   --   --    < > 11.9*  11.9*   HGB 10.4* 11.3* 13.7  --   --   --   --    < > 16.4 14.4   MCV 92 92 91  --   --   --   --    < > 89 89    175 213  --   --   --   --    < > 261 256   INR  --   --   --   --   --   --   --   --  1.16 1.05    139 141  --   --   --  142   < > 140 140   POTASSIUM 4.3 4.0 4.1  --   --   --  3.4   < > 3.9 3.7   CHLORIDE 107 106 108  --   --   --  108   < > 107 104   CO2 28 26 23  --   --   --  22   < > 21 25   BUN 28 26 29  --   --   --  32*   < > 28 26   CR 0.96 1.02 1.19  --   --   --  1.53*   < > 1.08 1.09   ANIONGAP 5 7 10  --   --   --  12   < > 12 11   ERIKA 7.6* 7.6* 7.8*  --   --   --  7.7*   < > 8.4* 8.9   * 185* 237*  --   --   --  216*   < > 229* 170*   ALBUMIN 1.6* 1.7* 2.0*   < >  --   --   --   --   --   --    PROTTOTAL 4.9* 4.9* 5.0*   < >  --   --   --   --   --   --    BILITOTAL 1.0 1.2 1.8*   < >  --   --   --   --   --   --    ALKPHOS 53 43 44   < >  --   --   --   --   --   --    ALT 12 10 14   < >  --   --   --   --   --   --    AST 16 12 17   < >  --   --   --   --   --   --    TROPI  --   --   --   --  0.066* 0.073* 0.084*  --   --   --     < > = values in this interval not displayed.       No results found for this or any previous visit (from the past 24 hour(s)).

## 2019-06-03 NOTE — PLAN OF CARE
Discharge Planner OT   Patient plan for discharge: Discharge plans depend on pt's progress throughout the week  Current status: MaxA x 2 for functional mobility; set up for grooming  Barriers to return to prior living situation: Generalized weakness and deconditioning, stairs to enter home, living alone, fall risk   Recommendations for discharge: TBD - home with home care vs STR at SNF depending on progress throughout the week  Rationale for recommendations: Pt exhibits impaired ADLs due to generalized weakness and deconditioning, as well as impaired balance, cognition, and coordination. Pt will benefit from ongoing skilled OT intervention to address his deficits and increase his independence in ADLs/IADLs. Discharge recommendations are TBD as this time; either STR or home with home therapy depending on pt's progress throughout the week. Pt lives alone and although his family appears very involved, they work full time. Plan to treat during pt's stay.        Entered by: Nimisha Zeng 06/03/2019 10:38 AM

## 2019-06-03 NOTE — PROVIDER NOTIFICATION
Late entry: Anesthesia Cici notified per phone no current order in [place] for Cadd pump infusion running currently at 8 ml continuous infusion since surgery. Total volume cleared 114.95 ml  Double checked with second RN Ximena GRANDA RN. Anesthesia coming in will place order on arrival. Cadd pump key removed from [employer] override and started at same infusion parameters set in surgery .Awaiting new order.

## 2019-06-03 NOTE — PROGRESS NOTES
Checked in with Ruddy.  Advised if a wound vac is needed and a SNF is needed for STR, [facility] would be the closest SNF. Brian is in agreement if a SNF is needed. Denies questions or concerns at this time.  CM will continue to remain available for support and resources.

## 2019-06-03 NOTE — PLAN OF CARE
Son, Heaven, brought in pt prepackaged medications from home from [facility]. Med rec completed.

## 2019-06-03 NOTE — PROGRESS NOTES
[facility] General Surgery Progress Note         Assessment and Plan:    Assessment:   80 yo male POD #3 s/p exploratory laparotomy from small bowel perforation with fascial dehiscence and POD #1 s/p exploratory laparotomy with small bowel anastomosis and fascial closure      Plan:   Neuro: continue Fentanyl IV and will keep EDC  Cardiac: HR and BP stable  Pulm: incentive spirometry, wean O2 as tolerated  GI: NPO, await return of bowel function   : Continue to monitor UOP with khalil, when mobile may discontinue  FEN: change over to maintenance fluids  Prophylaxis: SCDs, Lovenox daily  Heme/ID: continue Zosyn 4.5 q6 for perforated small bowel, sepsis  Leukocytosis normalized, inflammatory markers continue to decrease PCT, CRP    PT/OT consult  Out of bed up to chair    Additional problems to be followed by medicine team           Interval History:   The patient remained sedated and intubated overnight. His pressor requirements have gone down throughout the night. No acute events overnight          Significant Problems:    Active Problems:    Sepsis (H)    Chronic atrial fibrillation (H)    Malignant neoplasm of transverse colon (H)    Small bowel perforation (H)    Cardiac pacemaker in situ            Review of Systems:    The patient denies any chest pain, shortness of breath, excessive pain, fever, chills, purulent drainage from the wound, nausea or vomiting.         Medications:     All medications related to the patient's surgery have been reviewed  Current Facility-Administered Medications   Medication     glucose gel 15-30 g    Or     dextrose 50 % injection 25-50 mL    Or     glucagon injection 1 mg     diphenhydrAMINE (BENADRYL) solution 12.5 mg     enoxaparin (LOVENOX) injection 40 mg     fentaNYL (PF) (SUBLIMAZE) injection 25-50 mcg     fentaNYL (SUBLIMAZE) 2 mcg/mL, bupivacaine (MARCAINE) 0.125% in  mL PCEA infusion     heparin lock flush 10 UNIT/ML injection 5-10 mL     heparin lock  flush 10 UNIT/ML injection 5-10 mL     insulin aspart (NovoLOG) inj (RAPID ACTING)     ipratropium - albuterol 0.5 mg/2.5 mg/3 mL (DUONEB) neb solution 3 mL     lactated ringers infusion     lidocaine (LMX4) kit     lidocaine (LMX4) kit     lidocaine 1 % 0.1-1 mL     lidocaine 1 % 0.1-1 mL     medication instruction     medication instruction     nalbuphine (NUBAIN) injection 2.5-5 mg     naloxone (NARCAN) injection 0.1-0.4 mg     naloxone (NARCAN) injection 0.1-0.4 mg     ondansetron (ZOFRAN-ODT) ODT tab 4 mg    Or     ondansetron (ZOFRAN) injection 4 mg     pantoprazole (PROTONIX) 40 mg IV push injection     piperacillin-tazobactam (ZOSYN) intermittent infusion 4.5 g     sodium chloride (PF) 0.9% PF flush 10 mL     sodium chloride (PF) 0.9% PF flush 10-20 mL     sodium chloride (PF) 0.9% PF flush 10-20 mL     Facility-Administered Medications Ordered in Other Encounters   Medication     fentaNYL 2 mcg/mL, bupivacaine 0.125% in NS BOLUS             Physical Exam:     Vitals were reviewed  Patient Vitals for the past 8 hrs:   BP Temp Temp src Heart Rate Resp SpO2 Weight   06/03/19 0800 126/65 98.7  F (37.1  C) Tympanic 62 19 96 % --   06/03/19 0600 135/69 -- -- 65 22 95 % --   06/03/19 0500 118/63 -- -- 64 22 92 % 114.2 kg (251 lb 12.3 oz)   06/03/19 0400 94/51 -- -- 61 20 93 % --   06/03/19 0315 -- -- -- -- 20 93 % --   06/03/19 0300 92/50 -- -- 62 19 92 % --   06/03/19 0245 -- -- -- -- 18 (!) 91 % --   06/03/19 0230 -- -- -- -- 19 (!) 90 % --   06/03/19 0215 -- -- -- -- 18 (!) 91 % --   06/03/19 0200 120/63 -- -- 62 (!) 28 95 % --   06/03/19 0145 -- -- -- -- 21 95 % --   06/03/19 0130 -- -- -- -- 22 95 % --   06/03/19 0100 103/59 -- -- 65 19 95 % --   06/03/19 0045 -- -- -- -- 17 95 % --   06/03/19 0030 -- -- -- -- 20 94 % --       General:  Awake, alert, responds to questions appropriately  Chest:   Clear breath sound bilaterally, no wheeze  Cardiac:  Paced regular rythym  Abdomen: dressing with mild stride  through, fascia closed with no purulent JOSE gaytan serosangenous  Extremities:  Pitting edema to bilateral lower extremities    # Pain Assessment:  Current Pain Score 6/3/2019   Patient currently in pain? denies   Pain score (0-10) -   Pain location -   Pain descriptors -   rFLACC pain score -            Data:   All laboratory data related to this surgery reviewed  Results for orders placed or performed during the hospital encounter of 05/30/19 (from the past 24 hour(s))   XR Abdomen 1 View    Narrative    PROCEDURE:  XR ABDOMEN 1 VW    HISTORY:  exploratory laparotomy.    TECHNIQUE:  AP and supine radiographs of the abdomen.    COMPARISON:  5/30/2019    FINDINGS:     The diaphragms are excluded from the field-of-view.    2 surgical drains project over the right abdomen. An enteric tube is  partially visualized. A probable Meeks catheter is seen.    No dilated loops of small bowel or air-fluid levels are seen.      Impression    IMPRESSION:    Multiple drains in place.    LIZET ALBARADO MD   Glucose by meter   Result Value Ref Range    Glucose 145 (H) 70 - 99 mg/dL   Glucose by meter   Result Value Ref Range    Glucose 129 (H) 70 - 99 mg/dL   Glucose by meter   Result Value Ref Range    Glucose 171 (H) 70 - 99 mg/dL   Glucose by meter   Result Value Ref Range    Glucose 149 (H) 70 - 99 mg/dL   Comprehensive metabolic panel   Result Value Ref Range    Sodium 140 133 - 144 mmol/L    Potassium 4.3 3.4 - 5.3 mmol/L    Chloride 107 94 - 109 mmol/L    Carbon Dioxide 28 20 - 32 mmol/L    Anion Gap 5 3 - 14 mmol/L    Glucose 166 (H) 70 - 99 mg/dL    Urea Nitrogen 28 7 - 30 mg/dL    Creatinine 0.96 0.66 - 1.25 mg/dL    GFR Estimate 74 >60 mL/min/[1.73_m2]    GFR Estimate If Black 86 >60 mL/min/[1.73_m2]    Calcium 7.6 (L) 8.5 - 10.1 mg/dL    Bilirubin Total 1.0 0.2 - 1.3 mg/dL    Albumin 1.6 (L) 3.4 - 5.0 g/dL    Protein Total 4.9 (L) 6.8 - 8.8 g/dL    Alkaline Phosphatase 53 40 - 150 U/L    ALT 12 0 - 70 U/L    AST 16 0  - 45 U/L   Magnesium   Result Value Ref Range    Magnesium 2.5 (H) 1.6 - 2.3 mg/dL   CBC with platelets differential   Result Value Ref Range    WBC 10.7 4.0 - 11.0 10e9/L    RBC Count 3.39 (L) 4.4 - 5.9 10e12/L    Hemoglobin 10.4 (L) 13.3 - 17.7 g/dL    Hematocrit 31.1 (L) 40.0 - 53.0 %    MCV 92 78 - 100 fl    MCH 30.7 26.5 - 33.0 pg    MCHC 33.4 31.5 - 36.5 g/dL    RDW 16.5 (H) 10.0 - 15.0 %    Platelet Count 167 150 - 450 10e9/L    Diff Method Automated Method     % Neutrophils 85.6 %    % Lymphocytes 8.4 %    % Monocytes 5.1 %    % Eosinophils 0.0 %    % Basophils 0.2 %    % Immature Granulocytes 0.7 %    Nucleated RBCs 0 0 /100    Absolute Neutrophil 9.2 (H) 1.6 - 8.3 10e9/L    Absolute Lymphocytes 0.9 0.8 - 5.3 10e9/L    Absolute Monocytes 0.6 0.0 - 1.3 10e9/L    Absolute Eosinophils 0.0 0.0 - 0.7 10e9/L    Absolute Basophils 0.0 0.0 - 0.2 10e9/L    Abs Immature Granulocytes 0.1 0 - 0.4 10e9/L    Absolute Nucleated RBC 0.0    CRP inflammation   Result Value Ref Range    CRP Inflammation 188.0 (H) 0.0 - 8.0 mg/L   Procalcitonin   Result Value Ref Range    Procalcitonin 22.13 (HH) ng/ml         Pastor Vilchis MD

## 2019-06-03 NOTE — PHARMACY
[facility]    Pharmacy      Antimicrobial Stewardship Note     Current antimicrobial therapy:  Anti-infectives (From now, onward)    Start     Dose/Rate Route Frequency Ordered Stop    05/31/19 0900  piperacillin-tazobactam (ZOSYN) intermittent infusion 4.5 g      4.5 g  200 mL/hr over 30 Minutes Intravenous EVERY 6 HOURS 05/31/19 0847            Indication: Intra-abdominal infection     Days of Therapy: 4 (and 3 previous days of cephalosporin therapy)     Pertinent labs:  Creatinine   Creatinine   Date Value Ref Range Status   06/03/2019 0.96 0.66 - 1.25 mg/dL Final   06/02/2019 1.02 0.66 - 1.25 mg/dL Final   06/01/2019 1.19 0.66 - 1.25 mg/dL Final     WBC   WBC   Date Value Ref Range Status   06/03/2019 10.7 4.0 - 11.0 10e9/L Final   06/02/2019 9.1 4.0 - 11.0 10e9/L Final   06/01/2019 11.9 (H) 4.0 - 11.0 10e9/L Final     Procalcitonin   Procalcitonin   Date Value Ref Range Status   06/03/2019 22.13 (HH) ng/ml Final     Comment:     Critical Value called to and read back by  HOLLY BERRY AT 0552 BY TF  >/= 10.00 ng/ml   Very high likelihood of severe sepsis or septic shock.  Recommendation: Strongly recommend initiating or continuing antibiotics.   Evaluate culture results and clinical features to target antibacterial   therapy. Obtain blood cultures and other relevant cultures if not done.Repeat   PCT in 2 days to guide antibiotic de-escalation. Consider de-escalating   antibiotics when PCT concentration is <80% of peak or abs PCT <1.     06/02/2019 34.29 (HH) ng/ml Final     Comment:     Critical Value called to and read back by  ANNALIES STROMBERG AT 0603 06/02/19 AJE  >/= 10.00 ng/ml   Very high likelihood of severe sepsis or septic shock.  Recommendation: Strongly recommend initiating or continuing antibiotics.   Evaluate culture results and clinical features to target antibacterial   therapy. Obtain blood cultures and other relevant cultures if not done.Repeat   PCT in 2 days to guide  antibiotic de-escalation. Consider de-escalating   antibiotics when PCT concentration is <80% of peak or abs PCT <1.     06/01/2019 37.13 (HH) ng/ml Final     Comment:     Critical Value called to and read back by  DIONE SINGH AT 1328 ON 06/01/19 AJE  >/= 10.00 ng/ml   Very high likelihood of severe sepsis or septic shock.  Recommendation: Strongly recommend initiating or continuing antibiotics.   Evaluate culture results and clinical features to target antibacterial   therapy. Obtain blood cultures and other relevant cultures if not done.Repeat   PCT in 2 days to guide antibiotic de-escalation. Consider de-escalating   antibiotics when PCT concentration is <80% of peak or abs PCT <1.       CRP   CRP Inflammation   Date Value Ref Range Status   06/03/2019 188.0 (H) 0.0 - 8.0 mg/L Final   06/02/2019 262.0 (H) 0.0 - 8.0 mg/L Final     Culture Results:   7-Day Micro Results     Procedure Component Value Units Date/Time   Active MRSA Surveillance Culture    Order Status: Canceled Lab Status: No result    Specimen: Nasal Swab    Blood culture [[id number]] Collected: 05/31/19 0909   Order Status: Completed Lab Status: Preliminary result Updated: 06/03/19 0614   Specimen: Blood     Specimen Description Blood    Culture Micro No growth after 3 days   Blood culture [[id number]] Collected: 05/31/19 0902   Order Status: Completed Lab Status: Preliminary result Updated: 06/03/19 0614   Specimen: Blood     Specimen Description Blood    Culture Micro No growth after 3 days   Active MRSA Surveillance Culture [[id number]] Collected: 05/31/19 0430   Order Status: Completed Lab Status: Final result Updated: 06/01/19 0618   Specimen: Swab from Nose     Specimen Description Swab    Culture Micro No MRSA isolated     Recommendations/Interventions:  1. No recommendations at this time. Will continue to monitor.     Guevara Teague  Mishel 3, 2019

## 2019-06-03 NOTE — PLAN OF CARE
Discharge Planner PT   Patient plan for discharge: Patient to be either discharged home or to short term nursing home stay based on independence w/ wound vac and progress w/ ambulation.  Further assessment throughout the week required.  Current status: MaxA x 2 for all transfers and gait   Barriers to return to prior living situation: Independent ambulation including 7 stairs, management of wound and transportation for appointments   Recommendations for discharge: To be Dc'd to home or short term SNF based on progress by Friday 6/7/19  Rationale for recommendations: Patient PLOF independent for all tasks around the home.  Still demonstrating significant fatigue and lethargy requiring further assessment as week progresses.       Entered by: Vinny Monson 06/03/2019 10:50 AM

## 2019-06-03 NOTE — PROVIDER NOTIFICATION
.DATE:  6/3/2019   TIME OF RECEIPT FROM LAB:  LAB TEST:  0553  LAB VALUE:  Pro jose miguel 22.13  RESULTS GIVEN WITH READ-BACK TO (PROVIDER):  Dr Neumann  TIME LAB VALUE REPORTED TO PROVIDER:   0557

## 2019-06-03 NOTE — PLAN OF CARE
Family called and requested to bring home med list or medications to verify. Family will bring in.

## 2019-06-03 NOTE — PLAN OF CARE
Pt is improving post operatively and through the night. His cognition is improving. He was very confused at the beginning of the shift and unable to express himself verbally. He is now following simple commands and talking in short sentences. He did require q 2 hr doses of iv fentanyl in order to relax and sleep. He would otherwise become increasingly restless. The NGT is of particular irritation for him and he has made multiple attempts to touch and pull on it. He was successful on the previous shift. His epidural pump remains at a continuous infusion of 8 ml of fentanyl continuous post  op.  He is not clear enough to self administer bumps. His movements are becoming stronger and more purposeful through out the night. His respiratory rate is stable . O2 was increased to 3 L NC while sleeping. AM labs ordered. Will continue plan of care. BS remain quiet and abdominal dressing intact with some shadow (not increasing). Bilateral JOSE's are draining serosanguinous fluid.BBS remain diminished. He has a good strong independent cough without product. He remains afebrile.

## 2019-06-03 NOTE — PROGRESS NOTES
Inpatient Physical Therapy Evaluation    Name: Ag Perez MRN# [id number]   Age: 79 year old YOB: 1940     Date of Consultation: Mishel 3, 2019  Consultation is requested by:  Pastor Vilchis  Specific Consultation Request:  Post Operative eval for ambulation/home safety   Primary care provider: Ary Montanez    General Information:   Onset Date: 6/3/19    History of Current Problem/Admission: SBO (small bowel obstruction) (H) [K56.609]  Small bowel perforation (H) [K63.1]    Prior Level of Function: Patient was living independently in his home [facility]  He is independent in preparing meals, driving, grocery shopping and self cares.  Patient does have 6-7 steps to get into his home per his son.  Patient was previously very active with walking daily, managing his property and working on projects around the home.    Ambulation: 0 - Independent   Transferrin - Independent   Toiletin - Independent    Bathin - Independent   Dressin - Independent   Eatin - Independent   Communication: 0 - Understands/communicates without difficulty  Swallowin - swallows foods/liquids without difficulty  Cognition: 0 - no cognitive issues reported    Fall history within the last 6 months: No    Current Living Situation: Patient has 6-7 stairs to enter the home. Patient has a railing on either side.      Current Equipment Used at Home: n/a     Patient & Family Goals: Patient goals are to return home, though he will be placed with a wound vac potentially making selfing management difficult.  Patient does have family assistance, though they do work full time jobs.      Past Medical History:   Past Medical History:   Diagnosis Date     Arrhythmia      Hypertension      Pacemaker        Past Surgical History:  Past Surgical History:   Procedure Laterality Date     CARDIAC SURGERY       COLONOSCOPY       COLONOSCOPY N/A 2015    Procedure: COLONOSCOPY;  Surgeon: Kameron De Santiago MD;   Location: [facility] OR       Medications:   Current Facility-Administered Medications   Medication     dextrose 5% and 0.45% NaCl + KCl 20 mEq/L infusion     glucose gel 15-30 g    Or     dextrose 50 % injection 25-50 mL    Or     glucagon injection 1 mg     diphenhydrAMINE (BENADRYL) solution 12.5 mg     enoxaparin (LOVENOX) injection 40 mg     fentaNYL (PF) (SUBLIMAZE) injection 25-50 mcg     fentaNYL (SUBLIMAZE) 2 mcg/mL, bupivacaine (MARCAINE) 0.125% in  mL PCEA infusion     heparin lock flush 10 UNIT/ML injection 5-10 mL     heparin lock flush 10 UNIT/ML injection 5-10 mL     insulin aspart (NovoLOG) inj (RAPID ACTING)     ipratropium - albuterol 0.5 mg/2.5 mg/3 mL (DUONEB) neb solution 3 mL     lidocaine (LMX4) kit     lidocaine (LMX4) kit     lidocaine 1 % 0.1-1 mL     lidocaine 1 % 0.1-1 mL     medication instruction     medication instruction     nalbuphine (NUBAIN) injection 2.5-5 mg     naloxone (NARCAN) injection 0.1-0.4 mg     naloxone (NARCAN) injection 0.1-0.4 mg     ondansetron (ZOFRAN-ODT) ODT tab 4 mg    Or     ondansetron (ZOFRAN) injection 4 mg     pantoprazole (PROTONIX) 40 mg IV push injection     piperacillin-tazobactam (ZOSYN) intermittent infusion 4.5 g     sodium chloride (PF) 0.9% PF flush 10 mL     sodium chloride (PF) 0.9% PF flush 10-20 mL     sodium chloride (PF) 0.9% PF flush 10-20 mL     Facility-Administered Medications Ordered in Other Encounters   Medication     fentaNYL 2 mcg/mL, bupivacaine 0.125% in NS BOLUS       Weight Bearing Status: No WB restrictions - Epidural mid back and avoid w/ placement of belt      Cognitive Status Examination:  Orientation: lethargic and mildly confused  Level of Consciousness: lethargic  Follows Commands and Answers Questions: 75% of the time  Personal Safety and Judgement: Intact  Memory: Immediate recall impaired  Comments: not oriented to location, very polite and friendly.  Difficulty and delay w/ purposeful movements likely still affected by  anesthesia    Pain:   Currently in pain? No  Pain Location? Patient denies pain at this time   Pain Ratin (No pain)    Physical Therapy Evaluation:   Integumentary/Edema: Epidural in back and stomach surgery not observed  ROM: LE WFL  Strength: General strength impaired 4/5 for LE and UE strength w/ push forward requiring further assessment   Bed Mobility: MaxA  Transfers: MaxA x 2 from chair - poor tolerance once up w/ posterior lean able to stand 15 sec w/ support  Gait: not assessed this day  Stairs: NT  Balance: Poor  Sensory: NT  Coordination: Poor     Physical Therapy Interventions: Bed Mobility, Gait Training , Strengthening and Transfer Training    Clinical Impressions:  Criteria for Skilled Therapeutic Intervention Met: Yes, treatment indicated  PT Diagnosis: General Weakness, Gait Impairment  Influenced by the following impairments: LE strength deficits, Gait Impairments  Functional limitations due to impairment: Transfers, Sit to stand, Walking, Bed mobility   Clinical presentation: Evolving/Changing  Clinical presentation rationale: Therapist Discretion  Clinical Decision making (complexity): Low Complexity  Frequency: 6 times/week  Predicted Duration of Therapy Intervention (days/wks): 5 days  Anticipated Discharge Disposition: Home with Assist or Transitional Care Facility dependent on progress over next 2-3 days   Anticipated Equipment Needs at Discharge: front wheeled walker  Risks and Benefits of therapy have been explained: Yes  Patient, Family & other staff in agreement with plan of care: Yes  Clinical Impression Comments: Patient very independent prior to surgical intervention.  Doing well at this point though yet lethargic and weak.  Likely to significantly improve over next 2-3 days while will help w/ placement based on PLOF.    Total Eval Time: 10

## 2019-06-04 ENCOUNTER — APPOINTMENT (OUTPATIENT)
Dept: PHYSICAL THERAPY | Facility: HOSPITAL | Age: 79
DRG: 907 | End: 2019-06-04
Payer: MEDICARE

## 2019-06-04 ENCOUNTER — APPOINTMENT (OUTPATIENT)
Dept: OCCUPATIONAL THERAPY | Facility: HOSPITAL | Age: 79
DRG: 907 | End: 2019-06-04
Payer: MEDICARE

## 2019-06-04 LAB
ANION GAP SERPL CALCULATED.3IONS-SCNC: 3 MMOL/L (ref 3–14)
BUN SERPL-MCNC: 25 MG/DL (ref 7–30)
CALCIUM SERPL-MCNC: 7.7 MG/DL (ref 8.5–10.1)
CHLORIDE SERPL-SCNC: 110 MMOL/L (ref 94–109)
CO2 SERPL-SCNC: 28 MMOL/L (ref 20–32)
COPATH REPORT: NORMAL
COPATH REPORT: NORMAL
CREAT SERPL-MCNC: 1.03 MG/DL (ref 0.66–1.25)
CRP SERPL-MCNC: 116 MG/L (ref 0–8)
ERYTHROCYTE [DISTWIDTH] IN BLOOD BY AUTOMATED COUNT: 16.4 % (ref 10–15)
GFR SERPL CREATININE-BSD FRML MDRD: 68 ML/MIN/{1.73_M2}
GLUCOSE BLDC GLUCOMTR-MCNC: 126 MG/DL (ref 70–99)
GLUCOSE BLDC GLUCOMTR-MCNC: 127 MG/DL (ref 70–99)
GLUCOSE BLDC GLUCOMTR-MCNC: 144 MG/DL (ref 70–99)
GLUCOSE BLDC GLUCOMTR-MCNC: 145 MG/DL (ref 70–99)
GLUCOSE BLDC GLUCOMTR-MCNC: 154 MG/DL (ref 70–99)
GLUCOSE SERPL-MCNC: 153 MG/DL (ref 70–99)
HCT VFR BLD AUTO: 31.2 % (ref 40–53)
HGB BLD-MCNC: 10.3 G/DL (ref 13.3–17.7)
MCH RBC QN AUTO: 30.6 PG (ref 26.5–33)
MCHC RBC AUTO-ENTMCNC: 33 G/DL (ref 31.5–36.5)
MCV RBC AUTO: 93 FL (ref 78–100)
PLATELET # BLD AUTO: 169 10E9/L (ref 150–450)
POTASSIUM SERPL-SCNC: 4.1 MMOL/L (ref 3.4–5.3)
RBC # BLD AUTO: 3.37 10E12/L (ref 4.4–5.9)
SODIUM SERPL-SCNC: 141 MMOL/L (ref 133–144)
WBC # BLD AUTO: 11.9 10E9/L (ref 4–11)

## 2019-06-04 PROCEDURE — 25800030 ZZH RX IP 258 OP 636: Performed by: INTERNAL MEDICINE

## 2019-06-04 PROCEDURE — 25800030 ZZH RX IP 258 OP 636: Performed by: NURSE ANESTHETIST, CERTIFIED REGISTERED

## 2019-06-04 PROCEDURE — 25800030 ZZH RX IP 258 OP 636: Performed by: SURGERY

## 2019-06-04 PROCEDURE — 85027 COMPLETE CBC AUTOMATED: CPT | Performed by: NURSE PRACTITIONER

## 2019-06-04 PROCEDURE — 00000146 ZZHCL STATISTIC GLUCOSE BY METER IP

## 2019-06-04 PROCEDURE — 99233 SBSQ HOSP IP/OBS HIGH 50: CPT | Performed by: INTERNAL MEDICINE

## 2019-06-04 PROCEDURE — 25000128 H RX IP 250 OP 636: Performed by: NURSE ANESTHETIST, CERTIFIED REGISTERED

## 2019-06-04 PROCEDURE — 86140 C-REACTIVE PROTEIN: CPT | Performed by: NURSE PRACTITIONER

## 2019-06-04 PROCEDURE — 27110038 ZZH RX 271: Performed by: NURSE ANESTHETIST, CERTIFIED REGISTERED

## 2019-06-04 PROCEDURE — 97530 THERAPEUTIC ACTIVITIES: CPT | Mod: GP

## 2019-06-04 PROCEDURE — 80048 BASIC METABOLIC PNL TOTAL CA: CPT | Performed by: NURSE PRACTITIONER

## 2019-06-04 PROCEDURE — 25000128 H RX IP 250 OP 636: Performed by: SURGERY

## 2019-06-04 PROCEDURE — 25000128 H RX IP 250 OP 636: Performed by: INTERNAL MEDICINE

## 2019-06-04 PROCEDURE — C9113 INJ PANTOPRAZOLE SODIUM, VIA: HCPCS | Performed by: SURGERY

## 2019-06-04 PROCEDURE — 01996 DLY HOSP MGMT EDRL RX ADMIN: CPT | Performed by: NURSE ANESTHETIST, CERTIFIED REGISTERED

## 2019-06-04 PROCEDURE — 97530 THERAPEUTIC ACTIVITIES: CPT | Mod: GO

## 2019-06-04 PROCEDURE — 36415 COLL VENOUS BLD VENIPUNCTURE: CPT | Performed by: NURSE PRACTITIONER

## 2019-06-04 PROCEDURE — 12000000 ZZH R&B MED SURG/OB

## 2019-06-04 RX ORDER — FUROSEMIDE 10 MG/ML
20 INJECTION INTRAMUSCULAR; INTRAVENOUS ONCE
Status: COMPLETED | OUTPATIENT
Start: 2019-06-04 | End: 2019-06-04

## 2019-06-04 RX ORDER — HYDROMORPHONE HYDROCHLORIDE 1 MG/ML
0.2 INJECTION, SOLUTION INTRAMUSCULAR; INTRAVENOUS; SUBCUTANEOUS
Status: DISCONTINUED | OUTPATIENT
Start: 2019-06-04 | End: 2019-06-11 | Stop reason: HOSPADM

## 2019-06-04 RX ORDER — HALOPERIDOL 5 MG/ML
2 INJECTION INTRAMUSCULAR ONCE
Status: COMPLETED | OUTPATIENT
Start: 2019-06-04 | End: 2019-06-04

## 2019-06-04 RX ORDER — KETOROLAC TROMETHAMINE 15 MG/ML
15 INJECTION, SOLUTION INTRAMUSCULAR; INTRAVENOUS EVERY 6 HOURS
Status: COMPLETED | OUTPATIENT
Start: 2019-06-04 | End: 2019-06-05

## 2019-06-04 RX ORDER — HALOPERIDOL 5 MG/ML
1 INJECTION INTRAMUSCULAR EVERY 6 HOURS PRN
Status: DISCONTINUED | OUTPATIENT
Start: 2019-06-04 | End: 2019-06-05

## 2019-06-04 RX ADMIN — HYDROMORPHONE HYDROCHLORIDE 0.2 MG: 1 INJECTION, SOLUTION INTRAMUSCULAR; INTRAVENOUS; SUBCUTANEOUS at 12:41

## 2019-06-04 RX ADMIN — HALOPERIDOL LACTATE 1 MG: 5 INJECTION, SOLUTION INTRAMUSCULAR at 21:31

## 2019-06-04 RX ADMIN — TAZOBACTAM SODIUM AND PIPERACILLIN SODIUM 4.5 G: 500; 4 INJECTION, SOLUTION INTRAVENOUS at 00:42

## 2019-06-04 RX ADMIN — POTASSIUM CHLORIDE, DEXTROSE MONOHYDRATE AND SODIUM CHLORIDE: 150; 5; 450 INJECTION, SOLUTION INTRAVENOUS at 21:08

## 2019-06-04 RX ADMIN — ENOXAPARIN SODIUM 40 MG: 40 INJECTION SUBCUTANEOUS at 14:50

## 2019-06-04 RX ADMIN — KETOROLAC TROMETHAMINE 15 MG: 15 INJECTION, SOLUTION INTRAMUSCULAR; INTRAVENOUS at 20:29

## 2019-06-04 RX ADMIN — PANTOPRAZOLE SODIUM 40 MG: 40 INJECTION, POWDER, FOR SOLUTION INTRAVENOUS at 20:29

## 2019-06-04 RX ADMIN — FUROSEMIDE 20 MG: 10 INJECTION, SOLUTION INTRAVENOUS at 19:18

## 2019-06-04 RX ADMIN — KETOROLAC TROMETHAMINE 15 MG: 15 INJECTION, SOLUTION INTRAMUSCULAR; INTRAVENOUS at 14:38

## 2019-06-04 RX ADMIN — TAZOBACTAM SODIUM AND PIPERACILLIN SODIUM 4.5 G: 500; 4 INJECTION, SOLUTION INTRAVENOUS at 06:52

## 2019-06-04 RX ADMIN — PIPERACILLIN SODIUM AND TAZOBACTAM SODIUM 4.5 G: 4; .5 INJECTION, POWDER, LYOPHILIZED, FOR SOLUTION INTRAVENOUS at 13:00

## 2019-06-04 RX ADMIN — Medication 8 ML/HR: at 04:59

## 2019-06-04 RX ADMIN — HALOPERIDOL LACTATE 2 MG: 5 INJECTION, SOLUTION INTRAMUSCULAR at 23:12

## 2019-06-04 RX ADMIN — KETOROLAC TROMETHAMINE 15 MG: 15 INJECTION, SOLUTION INTRAMUSCULAR; INTRAVENOUS at 10:26

## 2019-06-04 RX ADMIN — PANTOPRAZOLE SODIUM 40 MG: 40 INJECTION, POWDER, FOR SOLUTION INTRAVENOUS at 10:26

## 2019-06-04 RX ADMIN — PIPERACILLIN SODIUM AND TAZOBACTAM SODIUM 4.5 G: 4; .5 INJECTION, POWDER, LYOPHILIZED, FOR SOLUTION INTRAVENOUS at 18:54

## 2019-06-04 RX ADMIN — POTASSIUM CHLORIDE, DEXTROSE MONOHYDRATE AND SODIUM CHLORIDE: 150; 5; 450 INJECTION, SOLUTION INTRAVENOUS at 10:26

## 2019-06-04 RX ADMIN — HYDROMORPHONE HYDROCHLORIDE 0.2 MG: 1 INJECTION, SOLUTION INTRAMUSCULAR; INTRAVENOUS; SUBCUTANEOUS at 21:57

## 2019-06-04 ASSESSMENT — ACTIVITIES OF DAILY LIVING (ADL)
ADLS_ACUITY_SCORE: 10
ADLS_ACUITY_SCORE: 11

## 2019-06-04 ASSESSMENT — MIFFLIN-ST. JEOR: SCORE: 1775.88

## 2019-06-04 NOTE — PROGRESS NOTES
[facility]    Hospitalist Progress Note    Date of Service (when I saw the patient): 06/04/2019    Assessment & Plan     Small bowel perforation (H): Presented with abdominal pain. 5/31/19 morning demonstrated acute abdomen on assessment. Dr. Vilchis was notified and he took him to OR urgently. Please see operative note.   -6/1/19- POD #1 Remains sedated,ventilated, abdomen open. NGT copious black returns, ETT minimal secretions of same color. Abdominal wound open with suction dressing with moderate amounts serosanguinous output.   -6/2/19-POD#2 Planning on second look today. Stable overnight with decreasing NGT output, abdomen soft. UO stable overnight.  -6/3/19- POD#3 small bowel resection. 6/2 he had wash out, facial closure. Please see operative note. He was extubated post op, taken off levohed and is doing extremely well. NGT in place. Pain minimal. No nausea. Epidural in place and running along with fentanyl IV PRN. UO and blood pressures have been excellent.  -6/4/19 POD#4 NG tube removed by surgery, still NPO.  Still with epidural in place.     Pulm: Vent post-op due to severe sepsis with respiratory compromise, open abdomen and plan to return to OR tomorrow for second look. First ABG on arrival to floor showed mixed acidosis-corrected with increase TV from 500 to 600. No known underlying chronic lung disease. Extubated post-op 6/2 and respiratory status has remained good. Weaned completely off oxygen without issue.        Metabolic and respiratory acidosis: Resolved. While on mechanical ventilation. Initial vent settings , rate 12 (breathing 16-20) and PEEP 5. Increased tidal volume to 550 then 600 and acidosis resolved..        Severe Sepsis with septic shock (H): Resolved. Due to acute peritonitis from small bowel perforation. Toxic appearing on first appearance with severe pain. Given 3 liter fluid bolus, started on IV Zosyn. Lactic acid was 4. To OR urgently for source control. IN  pre-op patient developed hypoxia, worsened tachypnea, hypotension. He was started on neosynephrine gtt in the OR.  Levophed off post-op 6/2. Afebrile, normal WBC. Lactica wilbur normalized 6/2 as well.      AILEEN: Due to severe sepsis with end organ damage. Urine output decent through case but started dropping off post-operatively. Creatinine bumped from 1.08 to 1.29 then 1.59. With levophed MAP has been kept consistently >65. Renal function back to normal already 6/1.       Chronic atrial fibrillation (H): Has a pacemaker, 100% paced on telemetry monitor, has been on Xarelto in the past but was taken off of it due to GI bleeding.        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 He did not have any immediate complications during that hospital stay. He is following-up with oncology.        Cardiac pacemaker in situ     DVT Prophylaxis: Enoxaparin (Lovenox) subcutaneous  Code Status: DNR     Disposition: Expected discharge in several days once tolerating oral diet.    Henna Macedo MD      Interval History   Patient seen at bedside.  Patient is confused, insisting that he is in [place].  Otherwise patient does not complain of any pain currently.  No nausea.  Still not passing flatus, no bowel movements.  No other acute overnight, no new symptoms.    -Data reviewed today: I reviewed all new labs and imaging results over the last 24 hours. I personally reviewed no images or EKG's today.    Physical Exam   Temp: 99  F (37.2  C) Temp src: Tympanic BP: 145/68   Heart Rate: 60 Resp: 19 SpO2: 93 % O2 Device: Nasal cannula Oxygen Delivery: 2 LPM  Vitals:    06/02/19 0500 06/03/19 0500 06/04/19 0534   Weight: 114.2 kg (251 lb 12.3 oz) 114.2 kg (251 lb 12.3 oz) 114.4 kg (252 lb 3.3 oz)     Vital Signs with Ranges  Temp:  [99  F (37.2  C)-99.2  F (37.3  C)] 99  F (37.2  C)  Heart Rate:  [60-74] 60  Resp:  [14-22] 19  BP: (109-166)/(63-80) 145/68  SpO2:  [87 %-98 %] 93  %    Intake/Output Summary (Last 24 hours) at 6/4/2019 0906  Last data filed at 6/4/2019 0800  Gross per 24 hour   Intake 3277 ml   Output 1853 ml   Net 1424 ml       Peripheral IV 07/24/15 Right Hand (Active)   Number of days: 1411       CVC Triple Lumen 05/31/19 Right (Active)   Site Assessment WDL 6/4/2019  8:00 AM   Dressing Intervention Chlorhexidine sponge;Transparent 6/4/2019  8:00 AM   Dressing Change Due 06/07/19 5/31/2019  8:00 PM   Lumen A - Color BROWN 6/4/2019  8:00 AM   Lumen A - Status saline locked 6/4/2019  8:00 AM   Lumen A - Cap Change Due 06/01/19 6/1/2019  4:00 AM   Lumen B - Color BLUE 6/4/2019  8:00 AM   Lumen B - Status infusing 6/4/2019  8:00 AM   Lumen B - Cap Change Due 06/07/19 5/31/2019  2:30 PM   Lumen C - Color WHITE 6/4/2019  8:00 AM   Lumen C - Status saline locked 6/4/2019  8:00 AM   Lumen C - Cap Change Due 06/07/19 5/31/2019  2:30 PM   Number of days: 4       Intrathecal/Epidural Catheter 05/31/19 (Active)   Site Assessment Drainage 6/3/2019  8:15 PM   Line Status Infusing 6/4/2019  5:00 AM   Dressing Type Transparent 6/4/2019  5:00 AM   Dressing Status Dressing  dry and intact 6/3/2019  8:15 PM   Number of days: 4       Closed/Suction Drain 1 Right Abdomen Bulb  (Active)   Site Description UT 6/4/2019  1:00 AM   Dressing Status Normal: Clean, Dry & Intact 6/4/2019  1:00 AM   Drainage Appearance Serosanguenous 6/4/2019  1:00 AM   Status To bulb suction 6/4/2019  1:00 AM   Output (ml) 10 ml 6/4/2019  8:00 AM   Number of days: 2       Closed/Suction Drain 2 Left Abdomen Bulb  (Active)   Site Description UTV 6/4/2019  1:00 AM   Dressing Status Normal: Clean, Dry & Intact 6/4/2019  1:00 AM   Drainage Appearance Serosanguenous 6/4/2019  1:00 AM   Status To bulb suction 6/4/2019  1:00 AM   Output (ml) 20 ml 6/4/2019  8:00 AM   Number of days: 2       NG/OG Tube Nasogastric Right nostril (Active)   Site Description WDL 6/4/2019  5:00 AM   Status Suction-low intermittent 6/4/2019  5:00  AM   Drainage Appearance Brown;Green 6/3/2019  4:00 PM   Placement Check [place] unchanged 6/4/2019  1:00 AM   [place] (cm marking) at nare/mouth 60 cm 6/4/2019  5:00 AM   Output (ml) 50 ml 6/4/2019  5:00 AM   Number of days: 1       Urethral Catheter Coude 16 fr (Active)   Tube Description Positional 6/3/2019  4:00 PM   Catheter Care Done 6/3/2019  4:00 PM   Collection Container Standard 6/4/2019  1:00 AM   Securement Method Securing device (Describe) 6/4/2019  1:00 AM   Rationale for Continued Use Strict 1-2 Hour I&O 6/4/2019  1:00 AM   Urine Output 225 mL 6/4/2019  8:00 AM   Number of days: 4       Incision/Surgical Site 06/02/19 Abdomen (Active)   Incision Assessment UTV 6/4/2019  1:00 AM   Magaly-Incision Assessment UTV 6/4/2019  1:00 AM   Incision Drainage Amount UTV 6/4/2019  1:00 AM   Drainage Description [facility] 6/4/2019  1:00 AM   Dressing Intervention Clean, dry, intact 6/4/2019  1:00 AM   Number of days: 2     Line/device assessment(s) completed for medical necessity    Constitutional - AA, NAD, confused  HEENT - atraumatic, normocephalic, NG tube and NC in place  Neck - supple, no masses, no JVD  CVS - S1 S2 RRR, no murmurs, rubs, gallops  Respiratory - CTA b/l  GI - soft, NT, ND, incision c/d/i, no organomegaly  Musculoskeletal - no LE edema, no lesions  Neuro - oriented x 1, no gross focal deficits    Medications     dextrose 5% and 0.45% NaCl + KCl 20 mEq/L 125 mL/hr at 06/03/19 1809       enoxaparin  40 mg Subcutaneous Q24H     heparin lock flush  5-10 mL Intracatheter Q24H     insulin aspart  1-6 Units Subcutaneous Q4H     ketorolac  15 mg Intravenous Q6H     pantoprazole (PROTONIX) IV  40 mg Intravenous BID     piperacillin-tazobactam  4.5 g Intravenous Q6H     sodium chloride (PF)  10 mL Intracatheter Q8H       Data   Recent Labs   Lab 06/04/19  0456 06/03/19  0450 06/02/19  0458  06/01/19  0108 05/31/19 2055 05/31/19  1759  05/31/19  0742 05/30/19  2315   WBC 11.9* 10.7 9.1   < >  --   --   --    <  > 11.9* 11.9*   HGB 10.3* 10.4* 11.3*   < >  --   --   --    < > 16.4 14.4   MCV 93 92 92   < >  --   --   --    < > 89 89    167 175   < >  --   --   --    < > 261 256   INR  --   --   --   --   --   --   --   --  1.16 1.05    140 139   < >  --   --  142   < > 140 140   POTASSIUM 4.1 4.3 4.0   < >  --   --  3.4   < > 3.9 3.7   CHLORIDE 110* 107 106   < >  --   --  108   < > 107 104   CO2 28 28 26   < >  --   --  22   < > 21 25   BUN 25 28 26   < >  --   --  32*   < > 28 26   CR 1.03 0.96 1.02   < >  --   --  1.53*   < > 1.08 1.09   ANIONGAP 3 5 7   < >  --   --  12   < > 12 11   ERIKA 7.7* 7.6* 7.6*   < >  --   --  7.7*   < > 8.4* 8.9   * 166* 185*   < >  --   --  216*   < > 229* 170*   ALBUMIN  --  1.6* 1.7*   < >  --   --   --   --   --   --    PROTTOTAL  --  4.9* 4.9*   < >  --   --   --   --   --   --    BILITOTAL  --  1.0 1.2   < >  --   --   --   --   --   --    ALKPHOS  --  53 43   < >  --   --   --   --   --   --    ALT  --  12 10   < >  --   --   --   --   --   --    AST  --  16 12   < >  --   --   --   --   --   --    TROPI  --   --   --   --  0.066* 0.073* 0.084*  --   --   --     < > = values in this interval not displayed.     Lactic Acid   Date Value Ref Range Status   06/02/2019 2.0 0.7 - 2.0 mmol/L Final   06/02/2019 1.9 0.7 - 2.0 mmol/L Final   06/01/2019 2.0 0.7 - 2.0 mmol/L Final       No results found for this or any previous visit (from the past 24 hour(s)).    Henna Macedo MD

## 2019-06-04 NOTE — PLAN OF CARE
Discharge Planner PT   Patient plan for discharge: Patient unable to verbalize plan.   Current status: Patient had decline from a mobility standpoint compared to morning session. Patient was Max A to dependent for supine to sit and sit to supine transfer and required Max A to maintain sitting EOB. Recommending Nia for transfers until mobility status improved for patient and staff safety.     Unable to determine if there is a difference between R and L UEs and LEs in terms of coordination during afternoon session due to limited performance of mobility. Patient was able to punch R UE in air from supine position and open hand and wiggle fingers. Patient unwilling to make fist with R hand when cued.   Barriers to return to prior living situation: 7 stairs to enter home, large change in cognitive baseline compared to how patient was functioning prior to admission.   Recommendations for discharge: TCU/SNF  Rationale for recommendations: Large change in mobility and cognitive status.        Entered by: April Florez 06/04/2019 3:01 PM

## 2019-06-04 NOTE — PLAN OF CARE
Pt is alert and disoriented to situation and time. Dr. Vilchis at bedside this AM to do first dressing change, pt tolerated well. BS- not audible. Lung sounds- clear, coarse in bases. Epidural removed this AM by EKATERINA Son. Scheduled Toradol given for pain, otherwise pt denying pain. Pt made step down and transferred to Med Surg.    Face to face report given with opportunity to observe patient.    Report given to Patrick Choe   6/4/2019  1:47 PM

## 2019-06-04 NOTE — PHARMACY
[facility]    Pharmacy      Antimicrobial Stewardship Note     Current antimicrobial therapy:  Anti-infectives (From now, onward)    Start     Dose/Rate Route Frequency Ordered Stop    06/04/19 1300  piperacillin-tazobactam (ZOSYN) 4.5 g in sodium chloride 0.9 % 100 mL intermittent infusion      4.5 g  100 mL/hr over 1 Hours Intravenous EVERY 6 HOURS 06/04/19 1209          Indication: perforated small bowel, sepsis    Days of Therapy: 5     Pertinent labs:  Creatinine   Creatinine   Date Value Ref Range Status   06/04/2019 1.03 0.66 - 1.25 mg/dL Final   06/03/2019 0.96 0.66 - 1.25 mg/dL Final   06/02/2019 1.02 0.66 - 1.25 mg/dL Final     WBC   WBC   Date Value Ref Range Status   06/04/2019 11.9 (H) 4.0 - 11.0 10e9/L Final   06/03/2019 10.7 4.0 - 11.0 10e9/L Final   06/02/2019 9.1 4.0 - 11.0 10e9/L Final     Procalcitonin   Procalcitonin   Date Value Ref Range Status   06/03/2019 22.13 (HH) ng/ml Final     Comment:     Critical Value called to and read back by  HOLLY BERRY AT 0552 BY TF  >/= 10.00 ng/ml   Very high likelihood of severe sepsis or septic shock.  Recommendation: Strongly recommend initiating or continuing antibiotics.   Evaluate culture results and clinical features to target antibacterial   therapy. Obtain blood cultures and other relevant cultures if not done.Repeat   PCT in 2 days to guide antibiotic de-escalation. Consider de-escalating   antibiotics when PCT concentration is <80% of peak or abs PCT <1.     06/02/2019 34.29 (HH) ng/ml Final     Comment:     Critical Value called to and read back by  ANNALIES STROMBERG AT 0603 06/02/19 AJE  >/= 10.00 ng/ml   Very high likelihood of severe sepsis or septic shock.  Recommendation: Strongly recommend initiating or continuing antibiotics.   Evaluate culture results and clinical features to target antibacterial   therapy. Obtain blood cultures and other relevant cultures if not done.Repeat   PCT in 2 days to guide antibiotic  de-escalation. Consider de-escalating   antibiotics when PCT concentration is <80% of peak or abs PCT <1.     06/01/2019 37.13 (HH) ng/ml Final     Comment:     Critical Value called to and read back by  DIONE SINGH AT 1328 ON 06/01/19 AJE  >/= 10.00 ng/ml   Very high likelihood of severe sepsis or septic shock.  Recommendation: Strongly recommend initiating or continuing antibiotics.   Evaluate culture results and clinical features to target antibacterial   therapy. Obtain blood cultures and other relevant cultures if not done.Repeat   PCT in 2 days to guide antibiotic de-escalation. Consider de-escalating   antibiotics when PCT concentration is <80% of peak or abs PCT <1.       CRP   CRP Inflammation   Date Value Ref Range Status   06/04/2019 116.0 (H) 0.0 - 8.0 mg/L Final   06/03/2019 188.0 (H) 0.0 - 8.0 mg/L Final   06/02/2019 262.0 (H) 0.0 - 8.0 mg/L Final       Culture Results:   Procedure Component Value Units Date/Time   Active MRSA Surveillance Culture    Order Status: Canceled Lab Status: No result    Specimen: Nasal Swab    Blood culture [[id number]] Collected: 05/31/19 0909   Order Status: Completed Lab Status: Preliminary result Updated: 06/04/19 0610   Specimen: Blood     Specimen Description Blood    Culture Micro No growth after 4 days   Blood culture [[id number]] Collected: 05/31/19 0902   Order Status: Completed Lab Status: Preliminary result Updated: 06/04/19 0610   Specimen: Blood     Specimen Description Blood    Culture Micro No growth after 4 days   Active MRSA Surveillance Culture [[id number]] Collected: 05/31/19 0430   Order Status: Completed Lab Status: Final result Updated: 06/01/19 0618   Specimen: Swab from Nose     Specimen Description Swab    Culture Micro No MRSA isolated       Recommendations/Interventions:  1. No recommendations at this time. Will continue to monitor.     Karishma Pineda, THADDEUS  June 4, 2019

## 2019-06-04 NOTE — PROGRESS NOTES
[facility] General Surgery Progress Note         Assessment and Plan:    Assessment:   80 yo male POD #4 s/p exploratory laparotomy from small bowel perforation with fascial dehiscence and POD #2 s/p exploratory laparotomy with small bowel anastomosis and fascial closure      Plan:   Neuro: EDC discontinued and will add on intermittent IV narcotics  Cardiac: HR and BP stable  Pulm: incentive spirometry, wean O2 as tolerated  GI: NPO, await return of bowel function   : Continue to monitor UOP with khalil, when mobile may discontinue  FEN: continue maintenance IVF  Prophylaxis: SCDs, Lovenox daily  Heme/ID: continue Zosyn 4.5 q6 for perforated small bowel, sepsis  BID dressing changes to midline wound and plan for VAC within 24-48 hours     PT/OT continue for further mobilization  Await return of bowel function  Transfer to [facility]    Additional problems to be followed by medicine team           Interval History:   The patient did well overnight with no acute events. He has no abdominal discomfort. He has not had any bowel function.          Significant Problems:    Active Problems:    Sepsis (H)    Chronic atrial fibrillation (H)    Malignant neoplasm of transverse colon (H)    Small bowel perforation (H)    Cardiac pacemaker in situ            Review of Systems:    The patient denies any chest pain, shortness of breath, excessive pain, fever, chills, purulent drainage from the wound, nausea or vomiting.         Medications:     All medications related to the patient's surgery have been reviewed  Current Facility-Administered Medications   Medication     dextrose 5% and 0.45% NaCl + KCl 20 mEq/L infusion     glucose gel 15-30 g    Or     dextrose 50 % injection 25-50 mL    Or     glucagon injection 1 mg     diphenhydrAMINE (BENADRYL) solution 12.5 mg     enoxaparin (LOVENOX) injection 40 mg     fentaNYL (PF) (SUBLIMAZE) injection 25-50 mcg     heparin lock flush 10 UNIT/ML injection 5-10 mL     heparin  lock flush 10 UNIT/ML injection 5-10 mL     HYDROmorphone (PF) (DILAUDID) injection 0.2 mg     insulin aspart (NovoLOG) inj (RAPID ACTING)     ipratropium - albuterol 0.5 mg/2.5 mg/3 mL (DUONEB) neb solution 3 mL     ketorolac (TORADOL) injection 15 mg     lidocaine (LMX4) kit     lidocaine (LMX4) kit     lidocaine 1 % 0.1-1 mL     lidocaine 1 % 0.1-1 mL     nalbuphine (NUBAIN) injection 2.5-5 mg     naloxone (NARCAN) injection 0.1-0.4 mg     naloxone (NARCAN) injection 0.1-0.4 mg     ondansetron (ZOFRAN-ODT) ODT tab 4 mg    Or     ondansetron (ZOFRAN) injection 4 mg     pantoprazole (PROTONIX) 40 mg IV push injection     piperacillin-tazobactam (ZOSYN) infusion 4.5 g     sodium chloride (PF) 0.9% PF flush 10 mL     sodium chloride (PF) 0.9% PF flush 10-20 mL     sodium chloride (PF) 0.9% PF flush 10-20 mL     Facility-Administered Medications Ordered in Other Encounters   Medication     fentaNYL 2 mcg/mL, bupivacaine 0.125% in NS BOLUS             Physical Exam:     Vitals were reviewed  Patient Vitals for the past 8 hrs:   BP Temp Temp src Heart Rate Resp SpO2 Weight   06/04/19 1115 149/68 99.1  F (37.3  C) Temporal 63 20 (!) 88 % --   06/04/19 1000 163/98 -- -- 82 12 -- --   06/04/19 0900 139/71 -- -- 63 -- -- --   06/04/19 0800 133/72 -- -- 64 17 -- --   06/04/19 0700 145/68 -- -- 60 23 -- --   06/04/19 0534 -- -- -- -- -- -- 114.4 kg (252 lb 3.3 oz)   06/04/19 0524 -- -- -- -- 19 93 % --   06/04/19 0515 -- -- -- -- 14 (!) 87 % --   06/04/19 0500 145/68 99  F (37.2  C) Tympanic 60 19 92 % --   06/04/19 0445 -- -- -- -- 21 95 % --   06/04/19 0430 109/64 -- -- 60 18 94 % --   06/04/19 0400 109/63 -- -- 60 17 93 % --   06/04/19 0345 -- -- -- -- 19 93 % --       General:  Awake, alert, responds to questions appropriately  Chest:   Clear breath sound bilaterally, no wheeze  Cardiac:  Paced regular rythym  Abdomen: dressing with mild stride through, fascia closed with no purulent debri, JOSE serosangenous  Extremities:   Pitting edema to bilateral lower extremities    # Pain Assessment:  Current Pain Score 6/4/2019   Patient currently in pain? denies   Pain score (0-10) -   Pain location -   Pain descriptors -   rFLACC pain score 0            Data:   All laboratory data related to this surgery reviewed  Results for orders placed or performed during the hospital encounter of 05/30/19 (from the past 24 hour(s))   Glucose by meter   Result Value Ref Range    Glucose 191 (H) 70 - 99 mg/dL   Glucose by meter   Result Value Ref Range    Glucose 158 (H) 70 - 99 mg/dL   Glucose by meter   Result Value Ref Range    Glucose 174 (H) 70 - 99 mg/dL   Glucose by meter   Result Value Ref Range    Glucose 145 (H) 70 - 99 mg/dL   CBC with platelets   Result Value Ref Range    WBC 11.9 (H) 4.0 - 11.0 10e9/L    RBC Count 3.37 (L) 4.4 - 5.9 10e12/L    Hemoglobin 10.3 (L) 13.3 - 17.7 g/dL    Hematocrit 31.2 (L) 40.0 - 53.0 %    MCV 93 78 - 100 fl    MCH 30.6 26.5 - 33.0 pg    MCHC 33.0 31.5 - 36.5 g/dL    RDW 16.4 (H) 10.0 - 15.0 %    Platelet Count 169 150 - 450 10e9/L   Basic metabolic panel   Result Value Ref Range    Sodium 141 133 - 144 mmol/L    Potassium 4.1 3.4 - 5.3 mmol/L    Chloride 110 (H) 94 - 109 mmol/L    Carbon Dioxide 28 20 - 32 mmol/L    Anion Gap 3 3 - 14 mmol/L    Glucose 153 (H) 70 - 99 mg/dL    Urea Nitrogen 25 7 - 30 mg/dL    Creatinine 1.03 0.66 - 1.25 mg/dL    GFR Estimate 68 >60 mL/min/[1.73_m2]    GFR Estimate If Black 78 >60 mL/min/[1.73_m2]    Calcium 7.7 (L) 8.5 - 10.1 mg/dL   CRP inflammation   Result Value Ref Range    CRP Inflammation 116.0 (H) 0.0 - 8.0 mg/L   Glucose by meter   Result Value Ref Range    Glucose 144 (H) 70 - 99 mg/dL         Pastor Vilchis MD

## 2019-06-04 NOTE — PLAN OF CARE
Face to face report given with opportunity to observe patient.    Report given to Bryleigh RN and Sadia Chaudhary   6/4/2019  7:40 AM

## 2019-06-04 NOTE — PROGRESS NOTES
Referral sent to [facility] ([facility] in [place] for screening. CM remains available.

## 2019-06-04 NOTE — PLAN OF CARE
Pt has been alert and disoriented to situation and time. VSS to baseline, max temp 99.1. Lungs CTA, BS-not audible, pt denies passing gas. CMS intact. CVC patent, blue-infusing, white and brown- SL. Dressing to abdomen CDI, unchanged from previous marking. Right and left JOSE drains patent with 70 ml total.[place]eeks patent with adequate output. NGT tube patent. Pt did pull out NGT tube this AM, tolerating insertion of new NGT. Sitter at bedside d/t increased confusion starting around 1200. MD notified. Epidural infusing, pt did utilize PCEA pump button x3 this shift for increased pain. Otherwise pt denying pain.  Pt up to chair this AM with assist of 3 using walker/gait belt.     Face to face report given with opportunity to observe patient.    Report given to Rosa Choe   6/3/2019  8:16 PM

## 2019-06-04 NOTE — PROGRESS NOTES
"CLINICAL NUTRITION SERVICES  -  ASSESSMENT NOTE    Ag Perez : NPO/Clear Liquids    79 yom admitted for sepsis and small bowel perforation. 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 Pt is POD# 4 for small bowel resection.  NPO day 5. Noted weight gain over the past few months. Noted hypoactive bowel sounds, NG tube low intermittent suction and no BM since before admit. Will continue to follow.    Diet Order: NPO    Height: 5' 5\"  Weight: 252 lbs 3.3 oz  Body mass index is 41.97 kg/m .  Weight Status:  Obesity Grade III BMI >40  IBWR: 111-149lb  Weight History: pt +14.5L per I/Os  240lb - 6/1/19 - admit weight  217lb - 4/30/19  221lb - 4/16/19    Estimated Energy Needs: 0155-8733 kcals ([place] Jeor stress factor 1.1-1.3) - admit weight  Estimated Protein Needs:  grams protein (1.2-1.5 g pro/Kg)- highest IBW  Estimated Fluid Needs: 9167-1894  mL (1 mL/Kcal)    Malnutrition Diagnosis: Patient does not meet two of the criteria necessary for diagnosing malnutrition.     NUTRITION DIAGNOSIS:  Inadequate oral intake related to GI dysfunction as evidenced by bowel perforation with hypoactive bowel sounds    NUTRITION RECOMMENDATIONS  - Advance diet as medically appropirate    MONITORING AND EVALUATION:  RD will monitor intake, thomas, labs      "

## 2019-06-04 NOTE — PLAN OF CARE
"  Problem: Respiratory Compromise (Bowel Resection)  Goal: Effective Oxygenation and Ventilation  6/4/2019 0527 by Emma Chaudhary, RN  Outcome: No Change   Apnea with sleep. 2lpm NC during the night. Sats >90% while pt is awake.     Problem: Postoperative Urinary Retention (Bowel Resection)  Goal: Effective Urinary Elimination  6/4/2019 0527 by Emma Chaudhary, RN  Outcome: No Change   Adequate UOP    Problem: Pain (Bowel Resection)  Goal: Acceptable Pain Control  6/4/2019 0527 by Emma Chaudhary, RN  Outcome: No Change   Denies pain this am. Fentanyl 8ml/hr basal epidural.    Problem: Adult Inpatient Plan of Care  Goal: Plan of Care Review  6/4/2019 0527 by Emma Chaudhary, RN  Outcome: No Change   Stable. CVC patent.   Zozyn q 6hr.  Pulling at tubes and lines t/o night. Pt was anxious t/o night during overhead codes being called, worried staff was \"rushing into the storms outside\"   More calm and directable this am. Knows self, believes he is in [facility].   "

## 2019-06-04 NOTE — PLAN OF CARE
Patient transferred from bed to chair with ass of 3 and walker. Upon return to bed becomes very argumentative and combative; attempting OOB and striking at staff . Dr Vilchis notified with no new orders at this time. When asked if his belly hurt he states yes, medicated with 0.2 mg dilaudid. 1:1 staff remains at bedside for safety.

## 2019-06-04 NOTE — PLAN OF CARE
Discharge Planner OT   Patient plan for discharge: Pt did not verbalize; recommending placement at this time.   Current status: Maximal assistance for LB dressing, moderate assistance x 2 for supine>sit, moderate-maximal for sitting up on EOB, maximal assistance x 2-3 for bed>chair transfer. Pt had difficulties following directions due to confusion.   Barriers to return to prior living situation: Confusion, generalized weakness, deconditioning, living alone, fall risk, requiring assistance x 2-3 at times  Recommendations for discharge: Short term rehab  Rationale for recommendations: Pt exhibited significant difficulties with ADLs/functional mobility, including LB dressing, bed mobility, sit<>stand transfers, and bed>chair transfer. R side exhibited increased dyscoordination/weakness during transfers. However, once pt was sitting, he moved B UE/LE's equally. Unsure if this was R sided deficits, or just difficulties following directions as pt continues to exhibit confusion, too. Also, pt appeared bothered with trying to move his extremities with all of the cords he is connected to. Nursing and PT advised to monitor.        Entered by: Nimisha Zeng 06/04/2019 11:28 AM

## 2019-06-04 NOTE — PLAN OF CARE
"Assumed care of patient at 1100 after receiving report from Sadia PULIDO RN. Transfers to room 3110 via chair. Alert, disoriented to date and situation. Very pleasant and cooperative. Did attempt to remove NG tube but was prevented before completely removing, tube was inserted back to th 60 ai, Dr Vilchis informed and came to bedside to confirm correct placement. No CXR required. Remains in chair until about 1230; then Transfers to bed with assist of 3 and walker. Upon getting into bed he suddenly becomes very angry and agitated, attempting get  to get out of bed, being combative with staff trying to prevent OOB. Is now disoriented to place as well. Paranoid. Dr Vilchis notified, no new orders at this time. Does admit to abdominal pain thus medicated with 0.2 mg dilaudid. FLACC scale used for assessment. Eventually calms but remains disoriented and is certain that staff are \"trying to kill me.\" PT sees patient and recommends Nia lift for transfers. See their  note.  Abd dressing remains CDI. JOSE drains x 2 with serosanguinous drainage. Meeks catheter for I/Os.  1:1 staffing for patient safety.  Face to face report given with opportunity to observe patient.    Report given to Francia Grey   6/4/2019  3:25 PM      "

## 2019-06-05 ENCOUNTER — APPOINTMENT (OUTPATIENT)
Dept: PHYSICAL THERAPY | Facility: HOSPITAL | Age: 79
DRG: 907 | End: 2019-06-05
Payer: MEDICARE

## 2019-06-05 ENCOUNTER — APPOINTMENT (OUTPATIENT)
Dept: OCCUPATIONAL THERAPY | Facility: HOSPITAL | Age: 79
DRG: 907 | End: 2019-06-05
Payer: MEDICARE

## 2019-06-05 LAB
ANION GAP SERPL CALCULATED.3IONS-SCNC: 5 MMOL/L (ref 3–14)
BASOPHILS # BLD AUTO: 0 10E9/L (ref 0–0.2)
BASOPHILS NFR BLD AUTO: 0 %
BUN SERPL-MCNC: 23 MG/DL (ref 7–30)
CALCIUM SERPL-MCNC: 7.6 MG/DL (ref 8.5–10.1)
CHLORIDE SERPL-SCNC: 108 MMOL/L (ref 94–109)
CO2 SERPL-SCNC: 27 MMOL/L (ref 20–32)
CREAT SERPL-MCNC: 1.01 MG/DL (ref 0.66–1.25)
DIFFERENTIAL METHOD BLD: ABNORMAL
EOSINOPHIL # BLD AUTO: 0.3 10E9/L (ref 0–0.7)
EOSINOPHIL NFR BLD AUTO: 3 %
ERYTHROCYTE [DISTWIDTH] IN BLOOD BY AUTOMATED COUNT: 16.1 % (ref 10–15)
GFR SERPL CREATININE-BSD FRML MDRD: 69 ML/MIN/{1.73_M2}
GLUCOSE BLDC GLUCOMTR-MCNC: 115 MG/DL (ref 70–99)
GLUCOSE BLDC GLUCOMTR-MCNC: 123 MG/DL (ref 70–99)
GLUCOSE BLDC GLUCOMTR-MCNC: 125 MG/DL (ref 70–99)
GLUCOSE BLDC GLUCOMTR-MCNC: 135 MG/DL (ref 70–99)
GLUCOSE SERPL-MCNC: 127 MG/DL (ref 70–99)
HCT VFR BLD AUTO: 31 % (ref 40–53)
HGB BLD-MCNC: 10.5 G/DL (ref 13.3–17.7)
LYMPHOCYTES # BLD AUTO: 1.2 10E9/L (ref 0.8–5.3)
LYMPHOCYTES NFR BLD AUTO: 13 %
MCH RBC QN AUTO: 31.2 PG (ref 26.5–33)
MCHC RBC AUTO-ENTMCNC: 33.9 G/DL (ref 31.5–36.5)
MCV RBC AUTO: 92 FL (ref 78–100)
MONOCYTES # BLD AUTO: 1 10E9/L (ref 0–1.3)
MONOCYTES NFR BLD AUTO: 11 %
NEUTROPHILS # BLD AUTO: 6.9 10E9/L (ref 1.6–8.3)
NEUTROPHILS NFR BLD AUTO: 73 %
PLATELET # BLD AUTO: 179 10E9/L (ref 150–450)
PLATELET # BLD EST: ABNORMAL 10*3/UL
POTASSIUM SERPL-SCNC: 4.1 MMOL/L (ref 3.4–5.3)
RBC # BLD AUTO: 3.37 10E12/L (ref 4.4–5.9)
RBC MORPH BLD: ABNORMAL
SODIUM SERPL-SCNC: 140 MMOL/L (ref 133–144)
VARIANT LYMPHS BLD QL SMEAR: PRESENT
WBC # BLD AUTO: 9.5 10E9/L (ref 4–11)

## 2019-06-05 PROCEDURE — 25000128 H RX IP 250 OP 636: Performed by: SURGERY

## 2019-06-05 PROCEDURE — 40000275 ZZH STATISTIC RCP TIME EA 10 MIN

## 2019-06-05 PROCEDURE — C9113 INJ PANTOPRAZOLE SODIUM, VIA: HCPCS | Performed by: SURGERY

## 2019-06-05 PROCEDURE — 25800030 ZZH RX IP 258 OP 636: Performed by: INTERNAL MEDICINE

## 2019-06-05 PROCEDURE — 99232 SBSQ HOSP IP/OBS MODERATE 35: CPT | Performed by: INTERNAL MEDICINE

## 2019-06-05 PROCEDURE — 12000000 ZZH R&B MED SURG/OB

## 2019-06-05 PROCEDURE — 85025 COMPLETE CBC W/AUTO DIFF WBC: CPT | Performed by: INTERNAL MEDICINE

## 2019-06-05 PROCEDURE — 97530 THERAPEUTIC ACTIVITIES: CPT | Mod: GO

## 2019-06-05 PROCEDURE — 25000132 ZZH RX MED GY IP 250 OP 250 PS 637: Performed by: INTERNAL MEDICINE

## 2019-06-05 PROCEDURE — 36415 COLL VENOUS BLD VENIPUNCTURE: CPT | Performed by: INTERNAL MEDICINE

## 2019-06-05 PROCEDURE — 80048 BASIC METABOLIC PNL TOTAL CA: CPT | Performed by: INTERNAL MEDICINE

## 2019-06-05 PROCEDURE — 25800030 ZZH RX IP 258 OP 636: Performed by: SURGERY

## 2019-06-05 PROCEDURE — 97530 THERAPEUTIC ACTIVITIES: CPT | Mod: GP

## 2019-06-05 PROCEDURE — 00000146 ZZHCL STATISTIC GLUCOSE BY METER IP

## 2019-06-05 RX ORDER — TAMSULOSIN HYDROCHLORIDE 0.4 MG/1
0.4 CAPSULE ORAL EVERY EVENING
Status: DISCONTINUED | OUTPATIENT
Start: 2019-06-05 | End: 2019-06-11 | Stop reason: HOSPADM

## 2019-06-05 RX ORDER — FUROSEMIDE 10 MG/ML
20 INJECTION INTRAMUSCULAR; INTRAVENOUS ONCE
Status: COMPLETED | OUTPATIENT
Start: 2019-06-05 | End: 2019-06-05

## 2019-06-05 RX ORDER — HALOPERIDOL 5 MG/ML
2 INJECTION INTRAMUSCULAR EVERY 6 HOURS PRN
Status: DISCONTINUED | OUTPATIENT
Start: 2019-06-05 | End: 2019-06-06

## 2019-06-05 RX ADMIN — PIPERACILLIN SODIUM AND TAZOBACTAM SODIUM 4.5 G: 4; .5 INJECTION, POWDER, LYOPHILIZED, FOR SOLUTION INTRAVENOUS at 18:38

## 2019-06-05 RX ADMIN — PIPERACILLIN SODIUM AND TAZOBACTAM SODIUM 4.5 G: 4; .5 INJECTION, POWDER, LYOPHILIZED, FOR SOLUTION INTRAVENOUS at 12:45

## 2019-06-05 RX ADMIN — POTASSIUM CHLORIDE, DEXTROSE MONOHYDRATE AND SODIUM CHLORIDE: 150; 5; 450 INJECTION, SOLUTION INTRAVENOUS at 17:21

## 2019-06-05 RX ADMIN — KETOROLAC TROMETHAMINE 15 MG: 15 INJECTION, SOLUTION INTRAMUSCULAR; INTRAVENOUS at 02:44

## 2019-06-05 RX ADMIN — PIPERACILLIN SODIUM AND TAZOBACTAM SODIUM 4.5 G: 4; .5 INJECTION, POWDER, LYOPHILIZED, FOR SOLUTION INTRAVENOUS at 01:11

## 2019-06-05 RX ADMIN — HYDROMORPHONE HYDROCHLORIDE 0.2 MG: 1 INJECTION, SOLUTION INTRAMUSCULAR; INTRAVENOUS; SUBCUTANEOUS at 06:29

## 2019-06-05 RX ADMIN — PIPERACILLIN SODIUM AND TAZOBACTAM SODIUM 4.5 G: 4; .5 INJECTION, POWDER, LYOPHILIZED, FOR SOLUTION INTRAVENOUS at 07:02

## 2019-06-05 RX ADMIN — HYDROMORPHONE HYDROCHLORIDE 0.2 MG: 1 INJECTION, SOLUTION INTRAMUSCULAR; INTRAVENOUS; SUBCUTANEOUS at 01:24

## 2019-06-05 RX ADMIN — PANTOPRAZOLE SODIUM 40 MG: 40 INJECTION, POWDER, FOR SOLUTION INTRAVENOUS at 20:40

## 2019-06-05 RX ADMIN — POTASSIUM CHLORIDE, DEXTROSE MONOHYDRATE AND SODIUM CHLORIDE: 150; 5; 450 INJECTION, SOLUTION INTRAVENOUS at 06:31

## 2019-06-05 RX ADMIN — KETOROLAC TROMETHAMINE 15 MG: 15 INJECTION, SOLUTION INTRAMUSCULAR; INTRAVENOUS at 14:35

## 2019-06-05 RX ADMIN — KETOROLAC TROMETHAMINE 15 MG: 15 INJECTION, SOLUTION INTRAMUSCULAR; INTRAVENOUS at 08:41

## 2019-06-05 RX ADMIN — FUROSEMIDE 20 MG: 10 INJECTION, SOLUTION INTRAVENOUS at 10:28

## 2019-06-05 RX ADMIN — ENOXAPARIN SODIUM 40 MG: 40 INJECTION SUBCUTANEOUS at 14:35

## 2019-06-05 RX ADMIN — HYDROMORPHONE HYDROCHLORIDE 0.2 MG: 1 INJECTION, SOLUTION INTRAMUSCULAR; INTRAVENOUS; SUBCUTANEOUS at 03:41

## 2019-06-05 RX ADMIN — TAMSULOSIN HYDROCHLORIDE 0.4 MG: 0.4 CAPSULE ORAL at 20:40

## 2019-06-05 RX ADMIN — PANTOPRAZOLE SODIUM 40 MG: 40 INJECTION, POWDER, FOR SOLUTION INTRAVENOUS at 08:41

## 2019-06-05 ASSESSMENT — MIFFLIN-ST. JEOR: SCORE: 1801.88

## 2019-06-05 ASSESSMENT — ACTIVITIES OF DAILY LIVING (ADL)
ADLS_ACUITY_SCORE: 11
ADLS_ACUITY_SCORE: 10
ADLS_ACUITY_SCORE: 10
ADLS_ACUITY_SCORE: 12
ADLS_ACUITY_SCORE: 12
ADLS_ACUITY_SCORE: 10

## 2019-06-05 NOTE — PROGRESS NOTES
[facility]    Hospitalist Progress Note    Date of Service (when I saw the patient): 06/05/2019    Assessment & Plan     Small bowel perforation (H): Presented with abdominal pain. 5/31/19 morning demonstrated acute abdomen on assessment. Dr. Vilchis was notified and he took him to OR urgently. Please see operative note.   -6/1/19- POD #1 Remains sedated,ventilated, abdomen open. NGT copious black returns, ETT minimal secretions of same color. Abdominal wound open with suction dressing with moderate amounts serosanguinous output.   -6/2/19-POD#2 Planning on second look today. Stable overnight with decreasing NGT output, abdomen soft. UO stable overnight.  -6/3/19- POD#3 small bowel resection. 6/2 he had wash out, facial closure. Please see operative note. He was extubated post op, taken off levohed and is doing extremely well. NGT in place. Pain minimal. No nausea. Epidural in place and running along with fentanyl IV PRN. UO and blood pressures have been excellent.  -6/4/19 POD#4 Still NPO.  Still with epidural in place.  -6/5/19 POD #5 clamped NG, wound vac placement today     Pulm: Vent post-op due to severe sepsis with respiratory compromise, open abdomen and plan to return to OR tomorrow for second look. First ABG on arrival to floor showed mixed acidosis-corrected with increase TV from 500 to 600. No known underlying chronic lung disease. Extubated post-op 6/2 and respiratory status has remained good. Weaned completely off oxygen without issues.        Metabolic and respiratory acidosis: Resolved.        Severe Sepsis with septic shock (H): Resolved. Due to acute peritonitis from small bowel perforation. Toxic appearing on first appearance with severe pain. Given 3 liter fluid bolus, started on IV Zosyn. Lactic acid was 4. To OR urgently for source control. IN pre-op patient developed hypoxia, worsened tachypnea, hypotension. He was started on neosynephrine gtt in the OR.  Levophed off post-op  6/2. Afebrile, normal WBC. Lactica wilbur normalized 6/2 as well.      AILEEN: Due to severe sepsis with end organ damage. Urine output decent through case but started dropping off post-operatively. Creatinine bumped from 1.08 to 1.29 then 1.59. With levophed MAP has been kept consistently >65. Renal function back to normal 6/1.       Chronic atrial fibrillation (H): Has a pacemaker, 100% paced on telemetry monitor, has been on Xarelto in the past but was taken off of it due to GI bleeding.        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 He did not have any immediate complications during that hospital stay. He is following-up with oncology.        Cardiac pacemaker in situ     DVT Prophylaxis: Enoxaparin (Lovenox) subcutaneous  Code Status: DNR     Disposition: Expected discharge in several days with return of spontaneous gut function.    Henna Macedo MD      Interval History   Patient seen at bedside.  Patient is still somewhat confused.  No flatus or BM yet.  Pain controlled.  No acute events overnight, no new symptoms.    -Data reviewed today: I reviewed all new labs and imaging results over the last 24 hours. I personally reviewed no images or EKG's today.    Physical Exam   Temp: 98.6  F (37  C) Temp src: Tympanic BP: 165/67   Heart Rate: 60 Resp: 22 SpO2: 92 % O2 Device: Nasal cannula Oxygen Delivery: 3 LPM  Vitals:    06/03/19 0500 06/04/19 0534 06/05/19 0500   Weight: 114.2 kg (251 lb 12.3 oz) 114.4 kg (252 lb 3.3 oz) 117 kg (257 lb 15 oz)     Vital Signs with Ranges  Temp:  [98.1  F (36.7  C)-99.1  F (37.3  C)] 98.6  F (37  C)  Heart Rate:  [60-63] 60  Resp:  [20-22] 22  BP: (139-165)/(64-68) 165/67  SpO2:  [88 %-94 %] 92 %      Intake/Output Summary (Last 24 hours) at 6/5/2019 1104  Last data filed at 6/5/2019 1034  Gross per 24 hour   Intake 2601 ml   Output 2160 ml   Net 441 ml       Peripheral IV 07/24/15 Right Hand (Active)   Number of days: 1412        Peripheral IV 06/05/19 Right Lower forearm (Active)   Number of days: 0       CVC Triple Lumen 05/31/19 Right (Active)   Site Assessment WDL 6/5/2019  9:00 AM   Dressing Intervention Chlorhexidine sponge 6/5/2019  1:10 AM   Dressing Change Due 06/07/19 5/31/2019  8:00 PM   Lumen A - Color BROWN 6/5/2019  9:00 AM   Lumen A - Status saline locked 6/5/2019  9:00 AM   Lumen A - Cap Change Due 06/01/19 6/1/2019  4:00 AM   Lumen B - Color BLUE 6/5/2019  9:00 AM   Lumen B - Status infusing 6/5/2019  9:00 AM   Lumen B - Cap Change Due 06/07/19 5/31/2019  2:30 PM   Lumen C - Color WHITE 6/5/2019  9:00 AM   Lumen C - Status infusing 6/5/2019  9:00 AM   Lumen C - Cap Change Due 06/07/19 5/31/2019  2:30 PM   Number of days: 5       Intrathecal/Epidural Catheter 05/31/19 (Active)   Site Assessment Drainage 6/3/2019  8:15 PM   Line Status Stopped 6/4/2019  8:00 AM   Dressing Type Transparent 6/4/2019  5:00 AM   Dressing Status Dressing  dry and intact 6/3/2019  8:15 PM   Number of days: 5       Closed/Suction Drain 1 Right Abdomen Bulb  (Active)   Site Description [facility] 6/5/2019  9:00 AM   Dressing Status Normal: Clean, Dry & Intact 6/5/2019  9:00 AM   Drainage Appearance Serosanguenous 6/5/2019  9:00 AM   Status To bulb suction 6/5/2019  9:00 AM   Output (ml) 30 ml 6/5/2019  8:37 AM   Number of days: 3       Closed/Suction Drain 2 Left Abdomen Bulb  (Active)   Site Description [facility] 6/5/2019  9:00 AM   Dressing Status Normal: Clean, Dry & Intact 6/5/2019  9:00 AM   Drainage Appearance Serosanguenous 6/5/2019  9:00 AM   Status To bulb suction 6/5/2019  9:00 AM   Output (ml) 40 ml 6/5/2019  6:31 AM   Number of days: 3       NG/OG Tube Nasogastric Right nostril (Active)   Site Description [facility] 6/5/2019  1:30 AM   Status Clamped 6/5/2019  9:00 AM   Drainage Appearance Brown;Green 6/5/2019  1:30 AM   Placement Check [place] unchanged 6/5/2019  1:30 AM   [place] (cm marking) at nare/mouth 60 cm 6/5/2019  9:00 AM   Output (ml) 10 ml  6/4/2019 10:08 PM   Number of days: 2       Urethral Catheter Coude 16 fr (Active)   Tube Description Positional 6/5/2019 10:34 AM   Catheter Care Done 6/5/2019 12:57 AM   Collection Container Standard 6/5/2019 10:34 AM   Securement Method Securing device (Describe) 6/5/2019 10:34 AM   Rationale for Continued Use Strict 1-2 Hour I&O 6/5/2019 10:34 AM   Urine Output 125 mL 6/5/2019 10:34 AM   Number of days: 5       Incision/Surgical Site 06/02/19 Abdomen (Active)   Incision Assessment Moist;Drainage 6/5/2019  9:00 AM   Magaly-Incision Assessment Erythema 6/5/2019  1:30 AM   Incision Drainage Amount Small 6/5/2019  9:00 AM   Drainage Description Serosanguinous 6/5/2019  9:00 AM   Incision Care Other (Comment) 6/4/2019  7:30 PM   Dressing Intervention Clean, dry, intact 6/5/2019  6:31 AM   Number of days: 3     Line/device assessment(s) completed for medical necessity    Constitutional - AA, NAD, confused  HEENT - atraumatic, normocephalic, NG tube and NC in place  Neck - supple, no masses, no JVD  CVS - S1 S2 RRR, no murmurs, rubs, gallops  Respiratory - CTA b/l  GI - soft, NT, ND, incision c/d/i, no organomegaly  Musculoskeletal - no LE edema, no lesions  Neuro - oriented x 1, no gross focal deficits    Medications     dextrose 5% and 0.45% NaCl + KCl 20 mEq/L 100 mL/hr at 06/05/19 0631       enoxaparin  40 mg Subcutaneous Q24H     heparin lock flush  5-10 mL Intracatheter Q24H     insulin aspart  1-6 Units Subcutaneous Q4H     ketorolac  15 mg Intravenous Q6H     pantoprazole (PROTONIX) IV  40 mg Intravenous BID     piperacillin-tazobactam  4.5 g Intravenous Q6H     sodium chloride (PF)  10 mL Intracatheter Q8H       Data   Recent Labs   Lab 06/05/19  0520 06/04/19  0456 06/03/19  0450 06/02/19  0458  06/01/19  0108 05/31/19  2055 05/31/19  1759  05/31/19  0742 05/30/19  2315   WBC 9.5 11.9* 10.7 9.1   < >  --   --   --    < > 11.9* 11.9*   HGB 10.5* 10.3* 10.4* 11.3*   < >  --   --   --    < > 16.4 14.4   MCV 92 93  92 92   < >  --   --   --    < > 89 89    169 167 175   < >  --   --   --    < > 261 256   INR  --   --   --   --   --   --   --   --   --  1.16 1.05    141 140 139   < >  --   --  142   < > 140 140   POTASSIUM 4.1 4.1 4.3 4.0   < >  --   --  3.4   < > 3.9 3.7   CHLORIDE 108 110* 107 106   < >  --   --  108   < > 107 104   CO2 27 28 28 26   < >  --   --  22   < > 21 25   BUN 23 25 28 26   < >  --   --  32*   < > 28 26   CR 1.01 1.03 0.96 1.02   < >  --   --  1.53*   < > 1.08 1.09   ANIONGAP 5 3 5 7   < >  --   --  12   < > 12 11   ERIKA 7.6* 7.7* 7.6* 7.6*   < >  --   --  7.7*   < > 8.4* 8.9   * 153* 166* 185*   < >  --   --  216*   < > 229* 170*   ALBUMIN  --   --  1.6* 1.7*   < >  --   --   --   --   --   --    PROTTOTAL  --   --  4.9* 4.9*   < >  --   --   --   --   --   --    BILITOTAL  --   --  1.0 1.2   < >  --   --   --   --   --   --    ALKPHOS  --   --  53 43   < >  --   --   --   --   --   --    ALT  --   --  12 10   < >  --   --   --   --   --   --    AST  --   --  16 12   < >  --   --   --   --   --   --    TROPI  --   --   --   --   --  0.066* 0.073* 0.084*  --   --   --     < > = values in this interval not displayed.     Lactic Acid   Date Value Ref Range Status   06/02/2019 2.0 0.7 - 2.0 mmol/L Final   06/02/2019 1.9 0.7 - 2.0 mmol/L Final   06/01/2019 2.0 0.7 - 2.0 mmol/L Final       No results found for this or any previous visit (from the past 24 hour(s)).    Henna Macedo MD

## 2019-06-05 NOTE — PLAN OF CARE
"Most recent vitals: /67 (BP Location: Right arm)   Pulse 70   Temp 99.1  F (37.3  C) (Temporal)   Resp 20   Ht 1.651 m (5' 5\")   Wt 114.4 kg (252 lb 3.3 oz)   SpO2 93%   BMI 41.97 kg/m    Pain Management:  had denied pain throughout this shift.  Approx 2200 patient restless making attempts to get out of bed. Paranoid and hallucinating- why are those kids behaving that?!\"  Another time reports seeing a black cat.  Picking at the air.  Medicated with haldol 1 mg at this time after about 15-20 minutes continues to be restless, attempting to get out of bed.  Using Flacc scale scoring 6/10 medicated with Dilaudid 0.2mg- will continue to monitor.   Orientation:  disoriented to place, time and situation. Follows commands pleasant at times.  Confused and disoriented and difficult to reassure others.  1:1 sitter present at bedside to keep patient safe from pulling out  and oxygen, NGT, central line and khalil which he has made attempts to pull out.  Has made attempts to kick at staff as they are trying to direct him into staying in bed as he is trying to get out.    Cardiac:  telemetry intact- 100% paced 60's  Positive for edema to bilateral arms/hands- left greater than right.    Respiratory:  encouraging coughing and deep breathing.    GI:  bowel sounds absent, no flatus.  Dressing change done as directed this shift.  NGT intact- minimal returns green color in tubing. JOSE drains intact- output as charted.   :  decreased urine output this shift- Dr. Vilchis here and notified, new orders to administer 20mg IV Lasix. Khalil intact- has had total of   Nutrition:  NPO   IVF: Central line triple lumen- blue lumen in maintenance fluids infusing. White and brown positive for blood return and flushed with saline this shift currently saline locked.   ABX:  Zosyn as scheduled  Mobility: turned and repositioned this shift- has remained in bed with bedside   Safety:  requiring bedside attendant to keep patient safe in bed " and drains and tubes in place.      6/4/2019  10:15 PM  Francia Leal      2300- patient continues to be agitated, kicking out at staff as they try to keep him safe in bed.  Updated Dr. Crook and new orders rec'd.   2312- 2 mg haldol adminstered.  Patient reassured and continues to have bedside attendant holding hand to help calm him.    2340- patient's eyes closed, sleeping, respirations regular and unlabors- sats 93% on 2LPM HR 60.  Arms elevated on pillows.    Face to face report given with opportunity to observe patient.    Report given to Lorenzo PUENTE RN.     Francia Leal   6/5/2019  12:14 AM

## 2019-06-05 NOTE — PLAN OF CARE
Discharge Planner PT   Patient plan for discharge: SNF  Current status: Pt seated in recliner at start of treatment. Pt had been trying to get out of chair and was sliding down to foot rest. Needed to use yogesh to position pt back in chair. From there pt was able to complete sit to stand transfer with mod A x2 using FWW. Once standing pt able to tolerate standing for about 5 minutes with frequent cues for posture and safety. From there pt tolerated ambulating about 10' with FWW and min A x2. At edoge of bed pt transferred to sitting with min A. Once at edge of bed pt tolerated seated LAQ, marching and ankle pumps x10 each. Nursing aide noticed pt had lost some packing from wound. Assisted pt to supine with mod A x2. Nurse's aide with pt at end of treatment  Barriers to return to prior living situation: Weakness, Ax2 for mobility, decreased activity tolerance, cognition  Recommendations for discharge: SNF for short term rehab         Entered by: Cristina Robins 06/05/2019 2:11 PM

## 2019-06-05 NOTE — PLAN OF CARE
"VSS, afebrile, HRR distant, telemetry reads 100% V Paced with intermittent AV pacing 60's, per ICU staff. Lungs clear, bowels faint and hypoactive, abdominal dressing CDI, JOSE drains output of 70 mL on #1 and 80 mL on #2, NG has had scant output of brown/green. Pt remains on 3L of O2 to maintain O2 sat mid 90's. Pt is still confused however he is now aware he is confused, and remembers the abdominal surgery and states this happened after his last surgery. The confusion comes and goes, and sometimes it can be worse than others. Pt will tell you \"yes\" to the pain medication if asked if he would like it, unable to give number, but Flacc scores reveal pain 5-6/10, pt is able to rest after pain medications given. Meeks remains intact with adequate output but on the lower side for my shift @ 450 mL total, kendal/dark yellow. Pt is very edematous, 3+ on hips, 2+ legs, and 2+ arms. 1:1 is present, and is a great help to prevent pt from removing tubes and wires.     Face to face report given with opportunity to observe patient.    Report given to PARISA Fisher   6/5/2019  8:48 AM    "

## 2019-06-05 NOTE — PROGRESS NOTES
[facility] General Surgery Progress Note         Assessment and Plan:    Assessment:   80 yo male POD #5 s/p exploratory laparotomy from small bowel perforation with fascial dehiscence and POD #3 s/p exploratory laparotomy with small bowel anastomosis and fascial closure      Plan:   At this time we will perform a clamping trial for his NGT. If residuals less than 400 after 4 hours then can pull the NGT. Will remove his khalil and monitor UOP, takes hydrochlorothiazide at home until gut function returns will give lasix. Continue PT/OT. Await return of bowel function. Wound VAC placement today.    Additional problems to be followed by medicine team           Interval History:   The patient did well overnight with no acute events. He has had no return of bowel function as of yet. He continues to have post operative delirium.          Significant Problems:    Active Problems:    Sepsis (H)    Chronic atrial fibrillation (H)    Malignant neoplasm of transverse colon (H)    Small bowel perforation (H)    Cardiac pacemaker in situ            Review of Systems:    The patient denies any chest pain, shortness of breath, excessive pain, fever, chills, purulent drainage from the wound, nausea or vomiting.         Medications:     All medications related to the patient's surgery have been reviewed  Current Facility-Administered Medications   Medication     dextrose 5% and 0.45% NaCl + KCl 20 mEq/L infusion     glucose gel 15-30 g    Or     dextrose 50 % injection 25-50 mL    Or     glucagon injection 1 mg     diphenhydrAMINE (BENADRYL) solution 12.5 mg     enoxaparin (LOVENOX) injection 40 mg     fentaNYL (PF) (SUBLIMAZE) injection 25-50 mcg     haloperidol lactate (HALDOL) injection 2 mg     heparin lock flush 10 UNIT/ML injection 5-10 mL     heparin lock flush 10 UNIT/ML injection 5-10 mL     HYDROmorphone (PF) (DILAUDID) injection 0.2 mg     insulin aspart (NovoLOG) inj (RAPID ACTING)     ipratropium - albuterol  0.5 mg/2.5 mg/3 mL (DUONEB) neb solution 3 mL     ketorolac (TORADOL) injection 15 mg     lidocaine (LMX4) kit     lidocaine (LMX4) kit     lidocaine 1 % 0.1-1 mL     lidocaine 1 % 0.1-1 mL     nalbuphine (NUBAIN) injection 2.5-5 mg     naloxone (NARCAN) injection 0.1-0.4 mg     naloxone (NARCAN) injection 0.1-0.4 mg     ondansetron (ZOFRAN-ODT) ODT tab 4 mg    Or     ondansetron (ZOFRAN) injection 4 mg     pantoprazole (PROTONIX) 40 mg IV push injection     piperacillin-tazobactam (ZOSYN) 4.5 g in sodium chloride 0.9 % 100 mL intermittent infusion     sodium chloride (PF) 0.9% PF flush 10 mL     sodium chloride (PF) 0.9% PF flush 10-20 mL     sodium chloride (PF) 0.9% PF flush 10-20 mL     Facility-Administered Medications Ordered in Other Encounters   Medication     fentaNYL 2 mcg/mL, bupivacaine 0.125% in NS BOLUS             Physical Exam:     Vitals were reviewed  Patient Vitals for the past 8 hrs:   BP Temp Temp src Heart Rate Resp SpO2 Weight   06/05/19 0859 165/67 98.6  F (37  C) Tympanic -- 22 92 % --   06/05/19 0740 -- -- -- -- -- 93 % --   06/05/19 0632 158/65 98.6  F (37  C) Tympanic 60 20 94 % --   06/05/19 0500 -- -- -- -- -- -- 117 kg (257 lb 15 oz)       General:  Awake, alert, responds to questions appropriately  Chest:   Clear breath sound bilaterally, no wheeze  Cardiac:  Paced regular rythym  Abdomen: dressing with mild stride through, fascia closed with no purulent JOSE gaytan serosangenous  Extremities:  Pitting edema to bilateral lower extremities    # Pain Assessment:  Current Pain Score 6/5/2019   Patient currently in pain? no   Pain score (0-10) -   Pain location -   Pain descriptors -   rFLACC pain score -            Data:   All laboratory data related to this surgery reviewed  Results for orders placed or performed during the hospital encounter of 05/30/19 (from the past 24 hour(s))   Glucose by meter   Result Value Ref Range    Glucose 144 (H) 70 - 99 mg/dL   Glucose by meter   Result  Value Ref Range    Glucose 126 (H) 70 - 99 mg/dL   Glucose by meter   Result Value Ref Range    Glucose 127 (H) 70 - 99 mg/dL   Glucose by meter   Result Value Ref Range    Glucose 154 (H) 70 - 99 mg/dL   Glucose by meter   Result Value Ref Range    Glucose 125 (H) 70 - 99 mg/dL   Basic metabolic panel   Result Value Ref Range    Sodium 140 133 - 144 mmol/L    Potassium 4.1 3.4 - 5.3 mmol/L    Chloride 108 94 - 109 mmol/L    Carbon Dioxide 27 20 - 32 mmol/L    Anion Gap 5 3 - 14 mmol/L    Glucose 127 (H) 70 - 99 mg/dL    Urea Nitrogen 23 7 - 30 mg/dL    Creatinine 1.01 0.66 - 1.25 mg/dL    GFR Estimate 69 >60 mL/min/[1.73_m2]    GFR Estimate If Black 80 >60 mL/min/[1.73_m2]    Calcium 7.6 (L) 8.5 - 10.1 mg/dL   CBC with platelets differential   Result Value Ref Range    WBC 9.5 4.0 - 11.0 10e9/L    RBC Count 3.37 (L) 4.4 - 5.9 10e12/L    Hemoglobin 10.5 (L) 13.3 - 17.7 g/dL    Hematocrit 31.0 (L) 40.0 - 53.0 %    MCV 92 78 - 100 fl    MCH 31.2 26.5 - 33.0 pg    MCHC 33.9 31.5 - 36.5 g/dL    RDW 16.1 (H) 10.0 - 15.0 %    Platelet Count 179 150 - 450 10e9/L    Diff Method Manual Differential     % Neutrophils 73.0 %    % Lymphocytes 13.0 %    % Monocytes 11.0 %    % Eosinophils 3.0 %    % Basophils 0.0 %    Absolute Neutrophil 6.9 1.6 - 8.3 10e9/L    Absolute Lymphocytes 1.2 0.8 - 5.3 10e9/L    Absolute Monocytes 1.0 0.0 - 1.3 10e9/L    Absolute Eosinophils 0.3 0.0 - 0.7 10e9/L    Absolute Basophils 0.0 0.0 - 0.2 10e9/L    Reactive Lymphs Present     RBC Morphology Consistent with reported results     Platelet Estimate       Automated count confirmed.  Platelet morphology is normal.         Pastor Vilchis MD

## 2019-06-05 NOTE — PLAN OF CARE
Discharge Planner OT   Patient plan for discharge: Pt unable to verbalize   Current status: Set-up and verbal cues for grooming tasks in supine; MaxA for LB dressing; pt also participated in BUE AROM including elbow, wrist, and hand  Barriers to return to prior living situation: Confusion, weakness, impaired activity tolerance, living alone, high fall risk  Recommendations for discharge: TCU  Rationale for recommendations: Pt functioning below his baseline and would not be safe to return home alone.        Entered by: Nimisha Zeng 06/05/2019 2:18 PM

## 2019-06-06 ENCOUNTER — APPOINTMENT (OUTPATIENT)
Dept: PHYSICAL THERAPY | Facility: HOSPITAL | Age: 79
DRG: 907 | End: 2019-06-06
Payer: MEDICARE

## 2019-06-06 ENCOUNTER — APPOINTMENT (OUTPATIENT)
Dept: OCCUPATIONAL THERAPY | Facility: HOSPITAL | Age: 79
DRG: 907 | End: 2019-06-06
Payer: MEDICARE

## 2019-06-06 LAB
ANION GAP SERPL CALCULATED.3IONS-SCNC: 7 MMOL/L (ref 3–14)
BACTERIA SPEC CULT: NORMAL
BACTERIA SPEC CULT: NORMAL
BASOPHILS # BLD AUTO: 0 10E9/L (ref 0–0.2)
BASOPHILS NFR BLD AUTO: 0 %
BUN SERPL-MCNC: 17 MG/DL (ref 7–30)
CALCIUM SERPL-MCNC: 7.6 MG/DL (ref 8.5–10.1)
CHLORIDE SERPL-SCNC: 108 MMOL/L (ref 94–109)
CO2 SERPL-SCNC: 25 MMOL/L (ref 20–32)
CREAT SERPL-MCNC: 0.95 MG/DL (ref 0.66–1.25)
DIFFERENTIAL METHOD BLD: ABNORMAL
EOSINOPHIL # BLD AUTO: 0.4 10E9/L (ref 0–0.7)
EOSINOPHIL NFR BLD AUTO: 3 %
ERYTHROCYTE [DISTWIDTH] IN BLOOD BY AUTOMATED COUNT: 15.9 % (ref 10–15)
GFR SERPL CREATININE-BSD FRML MDRD: 74 ML/MIN/{1.73_M2}
GLUCOSE BLDC GLUCOMTR-MCNC: 125 MG/DL (ref 70–99)
GLUCOSE BLDC GLUCOMTR-MCNC: 137 MG/DL (ref 70–99)
GLUCOSE BLDC GLUCOMTR-MCNC: 147 MG/DL (ref 70–99)
GLUCOSE BLDC GLUCOMTR-MCNC: 166 MG/DL (ref 70–99)
GLUCOSE SERPL-MCNC: 136 MG/DL (ref 70–99)
HCT VFR BLD AUTO: 31.2 % (ref 40–53)
HGB BLD-MCNC: 10.6 G/DL (ref 13.3–17.7)
LYMPHOCYTES # BLD AUTO: 0.8 10E9/L (ref 0.8–5.3)
LYMPHOCYTES NFR BLD AUTO: 7 %
MCH RBC QN AUTO: 30.7 PG (ref 26.5–33)
MCHC RBC AUTO-ENTMCNC: 34 G/DL (ref 31.5–36.5)
MCV RBC AUTO: 90 FL (ref 78–100)
METAMYELOCYTES # BLD: 0.4 10E9/L
METAMYELOCYTES NFR BLD MANUAL: 3 %
MONOCYTES # BLD AUTO: 0.2 10E9/L (ref 0–1.3)
MONOCYTES NFR BLD AUTO: 2 %
MYELOCYTES # BLD: 0.2 10E9/L
MYELOCYTES NFR BLD MANUAL: 2 %
NEUTROPHILS # BLD AUTO: 9.1 10E9/L (ref 1.6–8.3)
NEUTROPHILS NFR BLD AUTO: 78 %
NEUTS BAND # BLD AUTO: 0.6 10E9/L (ref 0–0.6)
NEUTS BAND NFR BLD MANUAL: 5 %
PLATELET # BLD AUTO: 229 10E9/L (ref 150–450)
PLATELET # BLD EST: ABNORMAL 10*3/UL
POTASSIUM SERPL-SCNC: 4.1 MMOL/L (ref 3.4–5.3)
RBC # BLD AUTO: 3.45 10E12/L (ref 4.4–5.9)
RBC MORPH BLD: ABNORMAL
SODIUM SERPL-SCNC: 140 MMOL/L (ref 133–144)
SPECIMEN SOURCE: NORMAL
SPECIMEN SOURCE: NORMAL
WBC # BLD AUTO: 11.7 10E9/L (ref 4–11)

## 2019-06-06 PROCEDURE — 12000000 ZZH R&B MED SURG/OB

## 2019-06-06 PROCEDURE — 25000128 H RX IP 250 OP 636: Performed by: INTERNAL MEDICINE

## 2019-06-06 PROCEDURE — 25000128 H RX IP 250 OP 636: Performed by: SURGERY

## 2019-06-06 PROCEDURE — 25800030 ZZH RX IP 258 OP 636: Performed by: SURGERY

## 2019-06-06 PROCEDURE — 25800030 ZZH RX IP 258 OP 636: Performed by: INTERNAL MEDICINE

## 2019-06-06 PROCEDURE — 97530 THERAPEUTIC ACTIVITIES: CPT | Mod: GP

## 2019-06-06 PROCEDURE — 85025 COMPLETE CBC W/AUTO DIFF WBC: CPT | Performed by: SURGERY

## 2019-06-06 PROCEDURE — 36415 COLL VENOUS BLD VENIPUNCTURE: CPT | Performed by: SURGERY

## 2019-06-06 PROCEDURE — 80048 BASIC METABOLIC PNL TOTAL CA: CPT | Performed by: SURGERY

## 2019-06-06 PROCEDURE — 00000146 ZZHCL STATISTIC GLUCOSE BY METER IP

## 2019-06-06 PROCEDURE — C9113 INJ PANTOPRAZOLE SODIUM, VIA: HCPCS | Performed by: SURGERY

## 2019-06-06 PROCEDURE — 25000132 ZZH RX MED GY IP 250 OP 250 PS 637: Performed by: SURGERY

## 2019-06-06 PROCEDURE — 97530 THERAPEUTIC ACTIVITIES: CPT | Mod: GO

## 2019-06-06 PROCEDURE — 99232 SBSQ HOSP IP/OBS MODERATE 35: CPT | Performed by: INTERNAL MEDICINE

## 2019-06-06 PROCEDURE — A9270 NON-COVERED ITEM OR SERVICE: HCPCS | Performed by: SURGERY

## 2019-06-06 RX ORDER — HALOPERIDOL 2 MG/ML
2 SOLUTION ORAL ONCE
Status: DISCONTINUED | OUTPATIENT
Start: 2019-06-06 | End: 2019-06-06 | Stop reason: CLARIF

## 2019-06-06 RX ORDER — HALOPERIDOL 5 MG/ML
2 INJECTION INTRAMUSCULAR ONCE
Status: COMPLETED | OUTPATIENT
Start: 2019-06-06 | End: 2019-06-06

## 2019-06-06 RX ORDER — HALOPERIDOL 5 MG/ML
3 INJECTION INTRAMUSCULAR ONCE
Status: COMPLETED | OUTPATIENT
Start: 2019-06-06 | End: 2019-06-06

## 2019-06-06 RX ORDER — TAMSULOSIN HYDROCHLORIDE 0.4 MG/1
0.4 CAPSULE ORAL DAILY
Status: DISCONTINUED | OUTPATIENT
Start: 2019-06-06 | End: 2019-06-06

## 2019-06-06 RX ORDER — HYDROCHLOROTHIAZIDE 25 MG/1
25 TABLET ORAL DAILY
Status: DISCONTINUED | OUTPATIENT
Start: 2019-06-06 | End: 2019-06-06

## 2019-06-06 RX ORDER — LISINOPRIL 40 MG/1
40 TABLET ORAL DAILY
Status: DISCONTINUED | OUTPATIENT
Start: 2019-06-06 | End: 2019-06-11 | Stop reason: HOSPADM

## 2019-06-06 RX ORDER — FUROSEMIDE 10 MG/ML
20 INJECTION INTRAMUSCULAR; INTRAVENOUS
Status: DISCONTINUED | OUTPATIENT
Start: 2019-06-06 | End: 2019-06-09

## 2019-06-06 RX ADMIN — HALOPERIDOL LACTATE 3 MG: 5 INJECTION, SOLUTION INTRAMUSCULAR at 20:20

## 2019-06-06 RX ADMIN — DIPHENHYDRAMINE HYDROCHLORIDE 12.5 MG: 25 SOLUTION ORAL at 00:19

## 2019-06-06 RX ADMIN — HYDROMORPHONE HYDROCHLORIDE 0.2 MG: 1 INJECTION, SOLUTION INTRAMUSCULAR; INTRAVENOUS; SUBCUTANEOUS at 19:25

## 2019-06-06 RX ADMIN — LISINOPRIL 40 MG: 40 TABLET ORAL at 13:09

## 2019-06-06 RX ADMIN — HALOPERIDOL LACTATE 2 MG: 5 INJECTION, SOLUTION INTRAMUSCULAR at 19:05

## 2019-06-06 RX ADMIN — PANTOPRAZOLE SODIUM 40 MG: 40 INJECTION, POWDER, FOR SOLUTION INTRAVENOUS at 09:42

## 2019-06-06 RX ADMIN — PIPERACILLIN SODIUM AND TAZOBACTAM SODIUM 4.5 G: 4; .5 INJECTION, POWDER, LYOPHILIZED, FOR SOLUTION INTRAVENOUS at 01:34

## 2019-06-06 RX ADMIN — FUROSEMIDE 20 MG: 10 INJECTION, SOLUTION INTRAVENOUS at 16:24

## 2019-06-06 RX ADMIN — FUROSEMIDE 20 MG: 10 INJECTION, SOLUTION INTRAVENOUS at 09:45

## 2019-06-06 RX ADMIN — HALOPERIDOL LACTATE 2 MG: 5 INJECTION, SOLUTION INTRAMUSCULAR at 01:51

## 2019-06-06 RX ADMIN — TAZOBACTAM SODIUM AND PIPERACILLIN SODIUM 3.38 G: 375; 3 INJECTION, SOLUTION INTRAVENOUS at 13:33

## 2019-06-06 RX ADMIN — POTASSIUM CHLORIDE, DEXTROSE MONOHYDRATE AND SODIUM CHLORIDE: 150; 5; 450 INJECTION, SOLUTION INTRAVENOUS at 04:59

## 2019-06-06 RX ADMIN — ENOXAPARIN SODIUM 40 MG: 40 INJECTION SUBCUTANEOUS at 13:10

## 2019-06-06 RX ADMIN — PIPERACILLIN SODIUM AND TAZOBACTAM SODIUM 4.5 G: 4; .5 INJECTION, POWDER, LYOPHILIZED, FOR SOLUTION INTRAVENOUS at 07:13

## 2019-06-06 ASSESSMENT — ACTIVITIES OF DAILY LIVING (ADL)
ADLS_ACUITY_SCORE: 10.5
ADLS_ACUITY_SCORE: 11
ADLS_ACUITY_SCORE: 10.5
ADLS_ACUITY_SCORE: 11
ADLS_ACUITY_SCORE: 11.5
ADLS_ACUITY_SCORE: 12

## 2019-06-06 ASSESSMENT — MIFFLIN-ST. JEOR: SCORE: 1748.88

## 2019-06-06 NOTE — PLAN OF CARE
Pt presented with VSS, denying pain throughout the shift. Continuing on 3L of O2 with sats around 92%. Pt is oriented to only self. Pt very restless throughout the night. Benadryl given to help him sleep with no results. Haldol given - pt seemed to settle down a bit, getting approximately 2 hours of broken sleep. Wound vac is WDL, incision has minimal amount of erythema. JOSE's draining serosanguineous fluid as charted. Voiding adequately. Lungs are clear. HR regular. Pt resting in bed, will continue to monitor.     Vital signs:  Temp: 99.8  F (37.7  C) Temp src: Tympanic BP: 133/58   Heart Rate: 70 Resp: 22 SpO2: 92 % O2 Device: Nasal cannula Oxygen Delivery: 3 LPM

## 2019-06-06 NOTE — PLAN OF CARE
Pt is A&O to self, pleasantly confused.  Redirectable.  VSS this a.m with no c/o pain - giving 30 mg IV Toradol as scheduled.  Bwls -unable to hear bwl sounds, no pasing of gas.  NG clamped for 4 hours this a.m then set to gravity with no output, NG removed.  2 Peripheral lines placed, 1 at TKO and the other with D51/2 K20 100 mls/hr infusing.  Getting IV antbtx.  Central line removed with gauze and  tegaderm dressing applied.  IV lasix 20 mg given once with 1440 output as charted, khalil removed at 1615 with large void at 2020-incontinence. Sips of clears started by Dr. Vilchis.  PTA Flomax started by hospitalist.  NO n/v noted.    Dressing to abd changed once today, 2 JOSE drains to bulb suction draining serosanguinous fluid, output as charted.  Dr. Vilchis placed wound vac at 2130.  Tele removed.  SCD's in place.  PT attempted to walk pt from chair to bed, pt unstable and weak, using yogesh.  Pt has bedside sitter in room. BP elevated as charted this evening.  Alarms in use and call light n reach.      Face to face report given with opportunity to observe patient.    Report given to Yulisa Barraza   6/5/2019  9:22 PM

## 2019-06-06 NOTE — PLAN OF CARE
Discharge Planner OT   Patient plan for discharge: Pt unable to verbalize.   Current status: MaxA for LB dressing, set-up for grooming tasks in sitting, Min-ModA x 2 for toileting/transfers  Barriers to return to prior living situation: Confusion, impaired activity tolerance, weakness, living alone, high fall risk  Recommendations for discharge: TCU/SNF  Rationale for recommendations: Pt still requiring assistance x 2 for ADLs and functional mobility        Entered by: Nimisha Zeng 06/06/2019 11:46 AM

## 2019-06-06 NOTE — PROGRESS NOTES
[facility] General Surgery Progress Note         Assessment and Plan:    Assessment:   80 yo male POD #6 s/p exploratory laparotomy from small bowel perforation with fascial dehiscence and POD #4 s/p exploratory laparotomy with small bowel anastomosis and fascial closure      Plan:   With patients return of bowel function with slowly advance diet as tolerated  Continue diuresis and can transition to home diuretics when able  Will adjust abx to 3.375 zosyn with f/u inflammatory markers to determine continuation  Decrease IVF  Wound care referral for wound vac  PT/OT  Nursing home placement when GI function returns fully    Additional problems to be followed by medicine team           Interval History:   The patient did well overnight with no acute events. He has a small smear of stool this morning. he has had no abdominal pain or discomfort.        Significant Problems:    Active Problems:    Sepsis (H)    Chronic atrial fibrillation (H)    Malignant neoplasm of transverse colon (H)    Small bowel perforation (H)    Cardiac pacemaker in situ            Review of Systems:    The patient denies any chest pain, shortness of breath, excessive pain, fever, chills, purulent drainage from the wound, nausea or vomiting.         Medications:     All medications related to the patient's surgery have been reviewed  Current Facility-Administered Medications   Medication     benazepril (LOTENSIN) tablet 40 mg     dextrose 5% and 0.45% NaCl + KCl 20 mEq/L infusion     glucose gel 15-30 g    Or     dextrose 50 % injection 25-50 mL    Or     glucagon injection 1 mg     diphenhydrAMINE (BENADRYL) solution 12.5 mg     enoxaparin (LOVENOX) injection 40 mg     fentaNYL (PF) (SUBLIMAZE) injection 25-50 mcg     furosemide (LASIX) injection 20 mg     heparin lock flush 10 UNIT/ML injection 5-10 mL     HYDROmorphone (PF) (DILAUDID) injection 0.2 mg     insulin aspart (NovoLOG) inj (RAPID ACTING)     ipratropium - albuterol 0.5  mg/2.5 mg/3 mL (DUONEB) neb solution 3 mL     [START ON 6/7/2019] levothyroxine (SYNTHROID/LEVOTHROID) tablet 125 mcg     lidocaine (LMX4) kit     lidocaine (LMX4) kit     lidocaine 1 % 0.1-1 mL     lidocaine 1 % 0.1-1 mL     nalbuphine (NUBAIN) injection 2.5-5 mg     naloxone (NARCAN) injection 0.1-0.4 mg     naloxone (NARCAN) injection 0.1-0.4 mg     ondansetron (ZOFRAN-ODT) ODT tab 4 mg    Or     ondansetron (ZOFRAN) injection 4 mg     piperacillin-tazobactam (ZOSYN) infusion 3.375 g     sodium chloride (PF) 0.9% PF flush 10-20 mL     sodium chloride (PF) 0.9% PF flush 10-20 mL     tamsulosin (FLOMAX) capsule 0.4 mg     Facility-Administered Medications Ordered in Other Encounters   Medication     fentaNYL 2 mcg/mL, bupivacaine 0.125% in NS BOLUS             Physical Exam:     Vitals were reviewed  Patient Vitals for the past 8 hrs:   BP Temp Temp src Heart Rate Resp SpO2 Weight   06/06/19 1044 159/70 98.9  F (37.2  C) Tympanic 70 20 93 % --   06/06/19 0551 -- -- -- -- -- -- 111.7 kg (246 lb 4.1 oz)   06/06/19 0505 133/58 99.8  F (37.7  C) Tympanic 70 22 92 % --       General:  Awake, alert, responds to questions appropriately  Chest:   Clear breath sound bilaterally, no wheeze  Cardiac:  Paced regular rythym  Abdomen: wound vac intact with no air leak, JPs serosangenous  Extremities:  Pitting edema to bilateral lower extremities    # Pain Assessment:  Current Pain Score 6/6/2019   Patient currently in pain? no   Pain score (0-10) -   Pain location -   Pain descriptors -   rFLACC pain score -            Data:   All laboratory data related to this surgery reviewed  Results for orders placed or performed during the hospital encounter of 05/30/19 (from the past 24 hour(s))   Glucose by meter   Result Value Ref Range    Glucose 135 (H) 70 - 99 mg/dL   Glucose by meter   Result Value Ref Range    Glucose 123 (H) 70 - 99 mg/dL   Glucose by meter   Result Value Ref Range    Glucose 115 (H) 70 - 99 mg/dL   Glucose by  meter   Result Value Ref Range    Glucose 125 (H) 70 - 99 mg/dL   Basic metabolic panel   Result Value Ref Range    Sodium 140 133 - 144 mmol/L    Potassium 4.1 3.4 - 5.3 mmol/L    Chloride 108 94 - 109 mmol/L    Carbon Dioxide 25 20 - 32 mmol/L    Anion Gap 7 3 - 14 mmol/L    Glucose 136 (H) 70 - 99 mg/dL    Urea Nitrogen 17 7 - 30 mg/dL    Creatinine 0.95 0.66 - 1.25 mg/dL    GFR Estimate 74 >60 mL/min/[1.73_m2]    GFR Estimate If Black 86 >60 mL/min/[1.73_m2]    Calcium 7.6 (L) 8.5 - 10.1 mg/dL   CBC with platelets differential   Result Value Ref Range    WBC 11.7 (H) 4.0 - 11.0 10e9/L    RBC Count 3.45 (L) 4.4 - 5.9 10e12/L    Hemoglobin 10.6 (L) 13.3 - 17.7 g/dL    Hematocrit 31.2 (L) 40.0 - 53.0 %    MCV 90 78 - 100 fl    MCH 30.7 26.5 - 33.0 pg    MCHC 34.0 31.5 - 36.5 g/dL    RDW 15.9 (H) 10.0 - 15.0 %    Platelet Count 229 150 - 450 10e9/L    Diff Method Manual Differential     % Neutrophils 78.0 %    % Lymphocytes 7.0 %    % Monocytes 2.0 %    % Eosinophils 3.0 %    % Basophils 0.0 %    % Band 5.0 %    % Metamyelocytes 3.0 %    % Myelocytes 2.0 %    Absolute Neutrophil 9.1 (H) 1.6 - 8.3 10e9/L    Absolute Lymphocytes 0.8 0.8 - 5.3 10e9/L    Absolute Monocytes 0.2 0.0 - 1.3 10e9/L    Absolute Eosinophils 0.4 0.0 - 0.7 10e9/L    Absolute Basophils 0.0 0.0 - 0.2 10e9/L    Absolute Bands 0.6 0.0 - 0.6 10e9/L    Absolute Metamyelocytes 0.4 (H) 0 10e9/L    Absolute Myelocytes 0.2 (H) 0 10e9/L    RBC Morphology Consistent with reported results     Platelet Estimate       Automated count confirmed.  Platelet morphology is normal.   Glucose by meter   Result Value Ref Range    Glucose 166 (H) 70 - 99 mg/dL         Pastor Vilchis MD

## 2019-06-06 NOTE — PLAN OF CARE
Discharge Planner PT   Patient plan for discharge: SNF  Current status: Pt seated in recliner at start of treatment. Pt very willing to participate in PT today. Pt transferred from sit to stand with min A x2 using FWW with cues for hand placement. Pt ambulated 10ft to commode with min A x2. Needed lots of cues for safety as pt turned to commode as tried to release walker prematurely. Once using commode pt having issues and needed a larger commode. Pt ambulated about 10ft to larger commode with min A x2, improved safety with turn. From commode pt ambulated about 5ft to recliner with min A x2. Nurse's aide with pt at end of session  Barriers to return to prior living situation: A x2 for mobility, weakness, decreased activity tolerance  Recommendations for discharge: SNF  Rationale for recommendations: Pt making progress but still not safe to discharge home.        Entered by: Cristina Robins 06/06/2019 1:15 PM

## 2019-06-06 NOTE — PLAN OF CARE
Face to face report given with opportunity to observe patient.    Report given to Natalia Montemayor   6/6/2019  8:49 AM

## 2019-06-06 NOTE — PLAN OF CARE
Pt alert to self, pleasantly confused and redirectable.  VSS, some PTA meds restarted today.  No c/o pain. Remains on 3 LPM O2 with SATS 92-93%, lungs clear.  VPM started today, has had family and friends in room most of day shift so unable to tell if this safety intervention is appropriate.  Alarms on in bed and in chair.  Improved mobility today, walking from chair to bed and in room with PT and asst x2 with staff and use of walker and much more stable on feet today.  Pt up 3 kg since admit, edematous mild to moderate t/o, lasix 20 mg IV increased to 2x daily, with output as charted, both continent and incontinent.  RUQ and LUQ bwls hypoactive, unable to detect activity in lower quadrants.  Passing gas, had 2 BM's today.  Tolerating clears, ADAT.  Noted some coughing after drinking fluids this a.m  but improved since then.  IV fluids decreased to 50 mls/hr, continuing with IV antbtx.  Wound vac in place with 50 mls output as charted.  2 JOSE drains in place as well - output as charted.  Up in chair t/o shift, back in bedd at approx 1415    Call light in reach.        At 1430, I noted pt half way OOB with legs to floor, buttocks at edge of bed and lying sideways across bed on back.  No [facility] person monitoring or responding.  After a minute I asked again if anyone was monitoring and a man responded yes from [facility].  I asked why he didn't intervene for the safety of this pt and he said pt was being watched.  I then called [facility] facility and asked why this pt wasn't being monitored and was told they are busy and got an admission and were unable to watch pt.  I stated someone should have contacted us if they were unable to monitor pt, asked for manager to speak and phone was given to co-worker with no success of speaking with supervisor or manager.  Pt unsafe to be using VPM, VPM turned off and removed.  Pt is now a 1:1 again.     Face to face report given with opportunity to observe patient.    Report given to Shanthi  PARISA Barraza   6/6/2019  3:19 PM

## 2019-06-06 NOTE — PHARMACY
[facility]    Pharmacy      Antimicrobial Stewardship Note     Current antimicrobial therapy:  Anti-infectives (From now, onward)    Start     Dose/Rate Route Frequency Ordered Stop    06/04/19 1300  piperacillin-tazobactam (ZOSYN) 4.5 g in sodium chloride 0.9 % 100 mL intermittent infusion      4.5 g  100 mL/hr over 1 Hours Intravenous EVERY 6 HOURS 06/04/19 1209          Indication: IAI    Days of Therapy: 7     Pertinent labs:  Creatinine   Creatinine   Date Value Ref Range Status   06/06/2019 0.95 0.66 - 1.25 mg/dL Final   06/05/2019 1.01 0.66 - 1.25 mg/dL Final   06/04/2019 1.03 0.66 - 1.25 mg/dL Final     WBC   WBC   Date Value Ref Range Status   06/06/2019 11.7 (H) 4.0 - 11.0 10e9/L Final   06/05/2019 9.5 4.0 - 11.0 10e9/L Final   06/04/2019 11.9 (H) 4.0 - 11.0 10e9/L Final     Procalcitonin   Procalcitonin   Date Value Ref Range Status   06/03/2019 22.13 (HH) ng/ml Final     Comment:     Critical Value called to and read back by  HOLLY BERRY AT 0552 BY TF  >/= 10.00 ng/ml   Very high likelihood of severe sepsis or septic shock.  Recommendation: Strongly recommend initiating or continuing antibiotics.   Evaluate culture results and clinical features to target antibacterial   therapy. Obtain blood cultures and other relevant cultures if not done.Repeat   PCT in 2 days to guide antibiotic de-escalation. Consider de-escalating   antibiotics when PCT concentration is <80% of peak or abs PCT <1.     06/02/2019 34.29 (HH) ng/ml Final     Comment:     Critical Value called to and read back by  ANNALIES STROMBERG AT 0603 06/02/19 AJE  >/= 10.00 ng/ml   Very high likelihood of severe sepsis or septic shock.  Recommendation: Strongly recommend initiating or continuing antibiotics.   Evaluate culture results and clinical features to target antibacterial   therapy. Obtain blood cultures and other relevant cultures if not done.Repeat   PCT in 2 days to guide antibiotic de-escalation. Consider  de-escalating   antibiotics when PCT concentration is <80% of peak or abs PCT <1.     06/01/2019 37.13 (HH) ng/ml Final     Comment:     Critical Value called to and read back by  DIONE SINGH AT 1328 ON 06/01/19 AJE  >/= 10.00 ng/ml   Very high likelihood of severe sepsis or septic shock.  Recommendation: Strongly recommend initiating or continuing antibiotics.   Evaluate culture results and clinical features to target antibacterial   therapy. Obtain blood cultures and other relevant cultures if not done.Repeat   PCT in 2 days to guide antibiotic de-escalation. Consider de-escalating   antibiotics when PCT concentration is <80% of peak or abs PCT <1.       CRP   CRP Inflammation   Date Value Ref Range Status   06/04/2019 116.0 (H) 0.0 - 8.0 mg/L Final   06/03/2019 188.0 (H) 0.0 - 8.0 mg/L Final   06/02/2019 262.0 (H) 0.0 - 8.0 mg/L Final     Culture Results:   7-Day Micro Results     Procedure Component Value Units Date/Time   Active MRSA Surveillance Culture    Order Status: Canceled Lab Status: No result    Specimen: Nasal Swab    Blood culture [[id number]] Collected: 05/31/19 0909   Order Status: Completed Lab Status: Preliminary result Updated: 06/05/19 0607   Specimen: Blood     Specimen Description Blood    Culture Micro No growth after 5 days   Blood culture [[id number]] Collected: 05/31/19 0902   Order Status: Completed Lab Status: Preliminary result Updated: 06/05/19 0607   Specimen: Blood     Specimen Description Blood    Culture Micro No growth after 5 days   Active MRSA Surveillance Culture [[id number]] Collected: 05/31/19 0430   Order Status: Completed Lab Status: Final result Updated: 06/01/19 0618   Specimen: Swab from Nose     Specimen Description Swab    Culture Micro No MRSA isolated     Recommendations/Interventions:  1. No recommendations at this time. Will continue to monitor.     Guevara Teague  June 6, 2019

## 2019-06-06 NOTE — PROVIDER NOTIFICATION
Dr. Crook notified of pt's ongoing restlessness, increasing inability to be redirected, and lack of sleep. Gave Benadryl with no results. Gave PRN haldol per Dr. Crook, will continue to monitor.

## 2019-06-06 NOTE — PROGRESS NOTES
[facility]    Hospitalist Progress Note    Date of Service (when I saw the patient): 06/06/2019    Assessment & Plan     Small bowel perforation (H): Presented with abdominal pain. 5/31/19 morning demonstrated acute abdomen on assessment. Dr. Vilchis was notified and he took him to OR urgently. Please see operative note.   -6/1/19- POD #1 Remains sedated,ventilated, abdomen open. NGT copious black returns, ETT minimal secretions of same color. Abdominal wound open with suction dressing with moderate amounts serosanguinous output.   -6/2/19-POD#2 Planning on second look today. Stable overnight with decreasing NGT output, abdomen soft. UO stable overnight.  -6/3/19- POD#3 small bowel resection. 6/2 he had wash out, facial closure. Please see operative note. He was extubated post op, taken off levohed and is doing extremely well. NGT in place. Pain minimal. No nausea. Epidural in place and running along with fentanyl IV PRN. UO and blood pressures have been excellent.  -6/4/19 POD#4 Still NPO.  Still with epidural in place.  -6/5/19 POD #5  NG removed, wound vac placed     Pulm: Vent post-op due to severe sepsis with respiratory compromise, open abdomen and plan to return to OR tomorrow for second look. First ABG on arrival to floor showed mixed acidosis-corrected with increase TV from 500 to 600. No known underlying chronic lung disease. Extubated post-op 6/2 and respiratory status has remained good.  Currently on 3L O2.  - patient is up 10 kg from admission  - will gently diurese to see if it will improve oxygenation        Metabolic and respiratory acidosis: Resolved.        Severe Sepsis with septic shock (H): Resolved. Due to acute peritonitis from small bowel perforation. Toxic appearing on first appearance with severe pain. Given 3 liter fluid bolus, started on IV Zosyn. Lactic acid was 4. To OR urgently for source control. IN pre-op patient developed hypoxia, worsened tachypnea, hypotension. He  "was started on neosynephrine gtt in the OR.  Levophed off post-op 6/2. Afebrile, normal WBC. Lactica wilbur normalized 6/2 as well.      AILEEN: Resolved.  Due to severe sepsis with end organ damage. Urine output decent through case but started dropping off post-operatively. Creatinine bumped from 1.08 to 1.29 then 1.59. With levophed MAP has been kept consistently >65. Renal function back to normal 6/1.       Chronic atrial fibrillation (H): Has a pacemaker, 100% paced on telemetry monitor, has been on Xarelto in the past but was taken off of it due to GI bleeding.        Malignant neoplasm of transverse colon (H): S/P resection 2 months ago at [facility]. He did not have any immediate complications during that hospital stay. He is following-up with oncology.        Cardiac pacemaker in situ     DVT Prophylaxis: Enoxaparin (Lovenox) subcutaneous  Code Status: DNR     Disposition: Expected discharge in several days with return of spontaneous gut function, safe discharge, will need TCU.    Henna Macedo MD      Interval History   Patient seen at bedside.  Patient is still confused, talking about \"phantoms\".  Staff reporting some smearing of stool, but no mae minal bowel movement yet.  Denies any pain, smith nausea.     -Data reviewed today: I reviewed all new labs and imaging results over the last 24 hours. I personally reviewed no images or EKG's today.    Physical Exam   Temp: 99.8  F (37.7  C) Temp src: Tympanic BP: 133/58   Heart Rate: 70 Resp: 22 SpO2: 92 % O2 Device: Nasal cannula Oxygen Delivery: 3 LPM  Vitals:    06/04/19 0534 06/05/19 0500 06/06/19 0551   Weight: 114.4 kg (252 lb 3.3 oz) 117 kg (257 lb 15 oz) 111.7 kg (246 lb 4.1 oz)     Vital Signs with Ranges  Temp:  [98.4  F (36.9  C)-99.9  F (37.7  C)] 99.8  F (37.7  C)  Heart Rate:  [65-76] 70  Resp:  [22] 22  BP: (133-168)/(58-71) 133/58  SpO2:  [92 %-94 %] 92 %      Intake/Output Summary (Last 24 hours) at 6/6/2019 0956  Last data filed at " 6/6/2019 0937  Gross per 24 hour   Intake 2946 ml   Output 2813 ml   Net 133 ml       Peripheral IV 07/24/15 Right Hand (Active)   Number of days: 1413       Peripheral IV 06/05/19 Right Lower forearm (Active)   Site Assessment WDL 6/6/2019  5:08 AM   Line Status Infusing 6/6/2019  5:08 AM   Phlebitis Scale 0-->no symptoms 6/6/2019  5:08 AM   Infiltration Scale 0 6/6/2019  5:08 AM   Number of days: 1       Closed/Suction Drain 1 Right Abdomen Bulb  (Active)   Site Description UTV 6/5/2019 10:35 PM   Dressing Status Normal: Clean, Dry & Intact 6/5/2019 10:35 PM   Drainage Appearance Serosanguenous 6/6/2019  5:00 AM   Status To bulb suction 6/5/2019 10:35 PM   Output (ml) 40 ml 6/6/2019  5:00 AM   Number of days: 4       Closed/Suction Drain 2 Left Abdomen Bulb  (Active)   Site Description UTV 6/5/2019 10:35 PM   Dressing Status Normal: Clean, Dry & Intact 6/5/2019 10:35 PM   Drainage Appearance Serosanguenous 6/6/2019  5:00 AM   Status To bulb suction 6/5/2019 10:35 PM   Output (ml) 38 ml 6/6/2019  5:00 AM   Number of days: 4       Incision/Surgical Site 06/02/19 Abdomen (Active)   Incision Assessment Moist;Drainage 6/5/2019 10:35 PM   Magaly-Incision Assessment Erythema 6/5/2019 10:35 PM   Incision Drainage Amount Small 6/5/2019 10:35 PM   Drainage Description Serosanguinous 6/5/2019 10:35 PM   Incision Care Other (Comment) 6/5/2019  2:26 PM   Dressing Intervention New dressing applied;Sterile dressing change 6/5/2019  2:26 PM   Number of days: 4     Line/device assessment(s) completed for medical necessity    Constitutional - AA, NAD, confused  HEENT - atraumatic, normocephalic, NG tube and NC in place  Neck - supple, no masses, no JVD  CVS - S1 S2 RRR, no murmurs, rubs, gallops  Respiratory - CTA b/l  GI - soft, NT, ND, incision c/d/i, no organomegaly  Musculoskeletal - no LE edema, no lesions  Neuro - oriented x 1, no gross focal deficits    Medications     dextrose 5% and 0.45% NaCl + KCl 20 mEq/L 100 mL/hr at  06/06/19 0459       enoxaparin  40 mg Subcutaneous Q24H     furosemide  20 mg Intravenous BID     heparin lock flush  5-10 mL Intracatheter Q24H     insulin aspart  1-6 Units Subcutaneous Q4H     pantoprazole (PROTONIX) IV  40 mg Intravenous BID     piperacillin-tazobactam  4.5 g Intravenous Q6H     tamsulosin  0.4 mg Oral QPM       Data   Recent Labs   Lab 06/06/19  0526 06/05/19  0520 06/04/19  0456 06/03/19  0450 06/02/19  0458  06/01/19  0108 05/31/19 2055 05/31/19  1759  05/31/19  0742 05/30/19  2315   WBC 11.7* 9.5 11.9* 10.7 9.1   < >  --   --   --    < > 11.9* 11.9*   HGB 10.6* 10.5* 10.3* 10.4* 11.3*   < >  --   --   --    < > 16.4 14.4   MCV 90 92 93 92 92   < >  --   --   --    < > 89 89    179 169 167 175   < >  --   --   --    < > 261 256   INR  --   --   --   --   --   --   --   --   --   --  1.16 1.05    140 141 140 139   < >  --   --  142   < > 140 140   POTASSIUM 4.1 4.1 4.1 4.3 4.0   < >  --   --  3.4   < > 3.9 3.7   CHLORIDE 108 108 110* 107 106   < >  --   --  108   < > 107 104   CO2 25 27 28 28 26   < >  --   --  22   < > 21 25   BUN 17 23 25 28 26   < >  --   --  32*   < > 28 26   CR 0.95 1.01 1.03 0.96 1.02   < >  --   --  1.53*   < > 1.08 1.09   ANIONGAP 7 5 3 5 7   < >  --   --  12   < > 12 11   ERIKA 7.6* 7.6* 7.7* 7.6* 7.6*   < >  --   --  7.7*   < > 8.4* 8.9   * 127* 153* 166* 185*   < >  --   --  216*   < > 229* 170*   ALBUMIN  --   --   --  1.6* 1.7*   < >  --   --   --   --   --   --    PROTTOTAL  --   --   --  4.9* 4.9*   < >  --   --   --   --   --   --    BILITOTAL  --   --   --  1.0 1.2   < >  --   --   --   --   --   --    ALKPHOS  --   --   --  53 43   < >  --   --   --   --   --   --    ALT  --   --   --  12 10   < >  --   --   --   --   --   --    AST  --   --   --  16 12   < >  --   --   --   --   --   --    TROPI  --   --   --   --   --   --  0.066* 0.073* 0.084*  --   --   --     < > = values in this interval not displayed.     Lactic Acid   Date Value Ref  Range Status   06/02/2019 2.0 0.7 - 2.0 mmol/L Final   06/02/2019 1.9 0.7 - 2.0 mmol/L Final   06/01/2019 2.0 0.7 - 2.0 mmol/L Final       No results found for this or any previous visit (from the past 24 hour(s)).    Henna Macedo MD

## 2019-06-06 NOTE — PLAN OF CARE
Face to face report given with opportunity to observe patient.    Report given to PARISA Carter   6/6/2019  8:17 AM

## 2019-06-06 NOTE — PROGRESS NOTES
Patient screened by Speech Language Pathologist during water intake.  Patient tolerating regular water.  It is recommended that patient eliminate straw use.  SLP department to follow up with full evaluation with foods when patient off clear liquid restrictions.

## 2019-06-06 NOTE — PROGRESS NOTES
Checked in with Ruddy.  Advised Geno Wise and [place] all screening, will update when more information available.  Denies questions or concerns at this time.  CTS will continue to remain available for support and resources.

## 2019-06-07 ENCOUNTER — APPOINTMENT (OUTPATIENT)
Dept: PHYSICAL THERAPY | Facility: HOSPITAL | Age: 79
DRG: 907 | End: 2019-06-07
Payer: MEDICARE

## 2019-06-07 ENCOUNTER — APPOINTMENT (OUTPATIENT)
Dept: OCCUPATIONAL THERAPY | Facility: HOSPITAL | Age: 79
DRG: 907 | End: 2019-06-07
Payer: MEDICARE

## 2019-06-07 ENCOUNTER — APPOINTMENT (OUTPATIENT)
Dept: SPEECH THERAPY | Facility: HOSPITAL | Age: 79
DRG: 907 | End: 2019-06-07
Attending: SURGERY
Payer: MEDICARE

## 2019-06-07 LAB
ANION GAP SERPL CALCULATED.3IONS-SCNC: 9 MMOL/L (ref 3–14)
BUN SERPL-MCNC: 16 MG/DL (ref 7–30)
CALCIUM SERPL-MCNC: 7.9 MG/DL (ref 8.5–10.1)
CHLORIDE SERPL-SCNC: 106 MMOL/L (ref 94–109)
CO2 SERPL-SCNC: 23 MMOL/L (ref 20–32)
CREAT SERPL-MCNC: 1.01 MG/DL (ref 0.66–1.25)
CRP SERPL-MCNC: 139 MG/L (ref 0–8)
ERYTHROCYTE [DISTWIDTH] IN BLOOD BY AUTOMATED COUNT: 15.9 % (ref 10–15)
GFR SERPL CREATININE-BSD FRML MDRD: 69 ML/MIN/{1.73_M2}
GLUCOSE BLDC GLUCOMTR-MCNC: 111 MG/DL (ref 70–99)
GLUCOSE BLDC GLUCOMTR-MCNC: 123 MG/DL (ref 70–99)
GLUCOSE BLDC GLUCOMTR-MCNC: 136 MG/DL (ref 70–99)
GLUCOSE BLDC GLUCOMTR-MCNC: 160 MG/DL (ref 70–99)
GLUCOSE SERPL-MCNC: 135 MG/DL (ref 70–99)
HCT VFR BLD AUTO: 36 % (ref 40–53)
HGB BLD-MCNC: 12.1 G/DL (ref 13.3–17.7)
MCH RBC QN AUTO: 30.6 PG (ref 26.5–33)
MCHC RBC AUTO-ENTMCNC: 33.6 G/DL (ref 31.5–36.5)
MCV RBC AUTO: 91 FL (ref 78–100)
PLATELET # BLD AUTO: 278 10E9/L (ref 150–450)
POTASSIUM SERPL-SCNC: 3.9 MMOL/L (ref 3.4–5.3)
PROCALCITONIN SERPL-MCNC: 2.92 NG/ML
RBC # BLD AUTO: 3.95 10E12/L (ref 4.4–5.9)
SODIUM SERPL-SCNC: 138 MMOL/L (ref 133–144)
WBC # BLD AUTO: 14.1 10E9/L (ref 4–11)

## 2019-06-07 PROCEDURE — 36415 COLL VENOUS BLD VENIPUNCTURE: CPT | Performed by: SURGERY

## 2019-06-07 PROCEDURE — 25000132 ZZH RX MED GY IP 250 OP 250 PS 637: Performed by: SURGERY

## 2019-06-07 PROCEDURE — 86140 C-REACTIVE PROTEIN: CPT | Performed by: SURGERY

## 2019-06-07 PROCEDURE — 84145 PROCALCITONIN (PCT): CPT | Performed by: SURGERY

## 2019-06-07 PROCEDURE — 12000000 ZZH R&B MED SURG/OB

## 2019-06-07 PROCEDURE — 97530 THERAPEUTIC ACTIVITIES: CPT | Mod: GP

## 2019-06-07 PROCEDURE — 92610 EVALUATE SWALLOWING FUNCTION: CPT | Mod: GN

## 2019-06-07 PROCEDURE — 25000128 H RX IP 250 OP 636: Performed by: SURGERY

## 2019-06-07 PROCEDURE — A9270 NON-COVERED ITEM OR SERVICE: HCPCS | Performed by: SURGERY

## 2019-06-07 PROCEDURE — 97530 THERAPEUTIC ACTIVITIES: CPT | Mod: GO

## 2019-06-07 PROCEDURE — 25000132 ZZH RX MED GY IP 250 OP 250 PS 637: Performed by: INTERNAL MEDICINE

## 2019-06-07 PROCEDURE — A9270 NON-COVERED ITEM OR SERVICE: HCPCS | Performed by: INTERNAL MEDICINE

## 2019-06-07 PROCEDURE — 99232 SBSQ HOSP IP/OBS MODERATE 35: CPT | Performed by: INTERNAL MEDICINE

## 2019-06-07 PROCEDURE — 85027 COMPLETE CBC AUTOMATED: CPT | Performed by: SURGERY

## 2019-06-07 PROCEDURE — 00000146 ZZHCL STATISTIC GLUCOSE BY METER IP

## 2019-06-07 PROCEDURE — 97110 THERAPEUTIC EXERCISES: CPT | Mod: GP

## 2019-06-07 PROCEDURE — 40000275 ZZH STATISTIC RCP TIME EA 10 MIN

## 2019-06-07 PROCEDURE — 80048 BASIC METABOLIC PNL TOTAL CA: CPT | Performed by: SURGERY

## 2019-06-07 PROCEDURE — 25000128 H RX IP 250 OP 636: Performed by: INTERNAL MEDICINE

## 2019-06-07 RX ORDER — HALOPERIDOL 5 MG/ML
2 INJECTION INTRAMUSCULAR EVERY 6 HOURS PRN
Status: DISCONTINUED | OUTPATIENT
Start: 2019-06-07 | End: 2019-06-11 | Stop reason: HOSPADM

## 2019-06-07 RX ORDER — QUETIAPINE FUMARATE 25 MG/1
25 TABLET, FILM COATED ORAL 2 TIMES DAILY
Status: DISCONTINUED | OUTPATIENT
Start: 2019-06-07 | End: 2019-06-09

## 2019-06-07 RX ORDER — HYDROCHLOROTHIAZIDE 25 MG/1
25 TABLET ORAL DAILY
Status: DISCONTINUED | OUTPATIENT
Start: 2019-06-07 | End: 2019-06-07

## 2019-06-07 RX ADMIN — ENOXAPARIN SODIUM 40 MG: 40 INJECTION SUBCUTANEOUS at 14:09

## 2019-06-07 RX ADMIN — QUETIAPINE FUMARATE 25 MG: 25 TABLET ORAL at 20:19

## 2019-06-07 RX ADMIN — FENTANYL CITRATE 50 MCG: 50 INJECTION INTRAMUSCULAR; INTRAVENOUS at 01:58

## 2019-06-07 RX ADMIN — LISINOPRIL 40 MG: 40 TABLET ORAL at 08:49

## 2019-06-07 RX ADMIN — HYDROMORPHONE HYDROCHLORIDE 0.2 MG: 1 INJECTION, SOLUTION INTRAMUSCULAR; INTRAVENOUS; SUBCUTANEOUS at 17:28

## 2019-06-07 RX ADMIN — FUROSEMIDE 20 MG: 10 INJECTION, SOLUTION INTRAVENOUS at 17:18

## 2019-06-07 RX ADMIN — FUROSEMIDE 20 MG: 10 INJECTION, SOLUTION INTRAVENOUS at 08:49

## 2019-06-07 RX ADMIN — LEVOTHYROXINE SODIUM 125 MCG: 100 TABLET ORAL at 06:54

## 2019-06-07 RX ADMIN — QUETIAPINE FUMARATE 25 MG: 25 TABLET ORAL at 11:57

## 2019-06-07 RX ADMIN — TAMSULOSIN HYDROCHLORIDE 0.4 MG: 0.4 CAPSULE ORAL at 20:19

## 2019-06-07 RX ADMIN — TAZOBACTAM SODIUM AND PIPERACILLIN SODIUM 3.38 G: 375; 3 INJECTION, SOLUTION INTRAVENOUS at 06:57

## 2019-06-07 RX ADMIN — FENTANYL CITRATE 50 MCG: 50 INJECTION INTRAMUSCULAR; INTRAVENOUS at 00:54

## 2019-06-07 ASSESSMENT — ACTIVITIES OF DAILY LIVING (ADL)
ADLS_ACUITY_SCORE: 11
ADLS_ACUITY_SCORE: 16
ADLS_ACUITY_SCORE: 18
ADLS_ACUITY_SCORE: 18
ADLS_ACUITY_SCORE: 16
ADLS_ACUITY_SCORE: 11

## 2019-06-07 NOTE — PLAN OF CARE
"Reason for hospital stay:  Sepsis  Most recent vitals: /67   Pulse 77   Temp 97.6  F (36.4  C) (Tympanic)   Resp 20   Ht 1.651 m (5' 5\")   Wt 111.7 kg (246 lb 4.1 oz)   SpO2 (!) 89%   BMI 40.98 kg/m    Pain Management:  Fentanyl given x2 with good relief  Orientation:  Alert, confused, agitated, aggressive, et very difficult to redirect. 1:1 staff   Cardiac:  WDL  Respiratory:  Lung sounds clear et diminished sats 89-90% on room air  GI:  Bowel sounds audible et hypoactive x4, serosanguinous drainage to both JOSE drains  :  WDL  Skin Issues:  Wound vac in place  IVF:  Saline locked  ABX:  Unable to administer 0200 abx, attempted to hook pt up et became more agitated   Mobility:  A2 with gait belt et walker  Safety:  1:1 staff et at times 3-4:1  Comments:      6/7/2019  4:14 AM  Danica Borges  Face to face report given with opportunity to observe patient.    Report given to Lorenzo Borges   6/7/2019  7:07 AM      "

## 2019-06-07 NOTE — PLAN OF CARE
Discharge Planner PT   Patient plan for discharge: TBD  Current status: Pt seated in recliner at start of treatment. Pt slightly confused but agreeable to treatment. Transferred sit to stand with min A using FWW. Pt ambulated about 40ft with FWW limited by fatigue. Follow pt with WC for safety. PT required about 2 minute seated rest break. Pt then ambulated about 40ft back to recliner with FWW and min A. Pt ambulated with slow rene, short step length, wide MIKE. Pt also participated in LE strengthening exercises   Barriers to return to prior living situation: Balance, weakness, impaired gait, decreased activity tolerance  Recommendations for discharge: SNF  Rationale for recommendations: Pt working well with PT and making progress however still recommending short term rehab       Entered by: Cristina Robins 06/07/2019 2:40 PM

## 2019-06-07 NOTE — PROGRESS NOTES
06/07/19 1300   General Information   Onset Date 05/31/19   Start of Care Date 06/07/19   Referring Physician Pastor Vilchis MD   Patient Profile Review/OT: Additional Occupational Profile Info See Profile for full history and prior level of function   Swallowing Evaluation Bedside swallow evaluation   Mode of current nutrition Oral diet   Type of oral diet Thin liquid;Other (Comment)  (full liquid diet)   Comments Patient is an 79 year old male admitted for small bowel perforation and sepsis on 5/31/19. Patient was NPO and on NGT which was removed 6/5/19. Patient has progressed to full liquid diet which was screened by SLP yesterday. Patient tolerating full liquid diet and finished his meal this morning. Bedside swallow evaluation ordered to determine if patient is safe to advance to NDD2 mechanical soft diet.    Clinical Swallow Evaluation   Oral Musculature generally intact   Structural Abnormalities none present   Dentition present and adequate;other (see comments)  (missing some teeth)   Mucosal Quality cracked   Mandibular Strength and Mobility intact   Oral Labial Strength and Mobility WFL   Lingual Strength and Mobility WFL   Velar Elevation intact   Buccal Strength and Mobility intact   Laryngeal Function Cough;Swallow;Voicing initiated   Oral Musculature Comments Oral musculatures are adequate for swallowing   Additional Documentation Yes   Rationale for completing additional evaluation A bedside swallow evaluation cannot rule out silent aspiration. If a care provider suspects that the patient is silently aspirating, a bedside swallow evaluation may be warranted   Clinical Swallow Eval: Thin Liquid Texture Trial   Mode of Presentation, Thin Liquids cup;self-fed   Volume of Liquid or Food Presented 4 oz   Oral Phase of Swallow WFL   Pharyngeal Phase of Swallow intact;coughing/choking;other (see comments)  (coughing with big gulp)   Diagnostic Statement Patient tolerated small sips of thin liquids  with no overt s/sx of pen/asp. When patient took a big gulp of liquids, he began to cough. Educated patient on the importance of small single sips   Clinical Swallow Eval: Puree Solid Texture Trial   Mode of Presentation, Puree cup;spoon;self-fed   Volume of Puree Presented 3 oz   Oral Phase, Puree WFL   Pharyngeal Phase, Puree intact   Diagnostic Statement Patient tolerated small bites of pureed texture with no overt s/sx of pen/asp   Clinical Swallow Eval: Semisolid Texture Trial   Mode of Presentation, Semisolid cup;spoon;self-fed   Volume of Semisolid Food Presented 3 oz   Oral Phase, Semisolid WFL;Residue in oral cavity   Oral Residue, Semisolid mid posterior tongue   Pharyngeal Phase, Semisolid intact;responsive to feedback   Successful Strategies Trialed During Procedure, Semisolid other (see comments)  (liquid rinse)   Diagnostic Statement Patient tolerated mechanical soft diet with no overt s/sx of pen/asp. Patient had some oral residue on mid tongue which was cleared with a liquid rinse. Patient did not demonstrate any difficulties with mastication.    Swallow Compensations   Swallow Compensations Alternate viscosity of consistencies;Pacing;Reduce amounts   Results No difficulties noted   General Therapy Interventions   Planned Therapy Interventions Dysphagia Treatment   Dysphagia treatment Modified diet education;Instruction of safe swallow strategies   Intervention Comments advance diet as tolerated   Swallow Eval: Clinical Impressions   Skilled Criteria for Therapy Intervention Skilled criteria met.  Treatment indicated.   Functional Assessment Scale (FAS) 5   Dysphagia Outcome Severity Scale (YOLY) Level 5 - YOLY   Treatment Diagnosis Oropharyngeal dysphagia   Diet texture recommendations Dysphagia diet level 2;Thin liquids   Recommended Feeding/Eating Techniques alternate between small bites and sips of food/liquid;check mouth frequently for oral residue/pocketing;maintain upright posture during/after  eating for 30 mins;no straws;small sips/bites   Therapy Frequency 3 times/wk   Predicted Duration of Therapy Intervention (days/wks) 1 week   Risks and Benefits of Treatment have been explained. Yes   Patient, family and/or staff in agreement with Plan of Care Yes   Clinical Impression Comments Patient is demonstrating tolerance to advance from a full liquid diet to a mechanical soft diet with thin liquids. Patient demonstrates adequate mastication required for mechanical soft diet with minimal residue in oral cavity. Patient able to clear residue with liquid rinse. Encouraged patient to take small bits/sips and alternate between consistencies. Follow aspiration precautions.

## 2019-06-07 NOTE — PLAN OF CARE
Discharge Planner SLP   Patient plan for discharge: Patient did not state  Current status: NDD2 mechanical soft diet with thin liquids  Barriers to return to prior living situation: none known for swallowing  Recommendations for discharge: aspiration precautions; cues to slow down rate of presentation of liquids (I.e., small sips). Alternate between small bites and small sips  Rationale for recommendations: Patient is independent and safe with diet; will advance diet as tolerated       Entered by: Treva Byrd 06/07/2019 2:59 PM

## 2019-06-07 NOTE — PLAN OF CARE
VSS, afebrile, HRR, lungs clear, bowels active. JOSE drains intact, dressings CDI, #1 had 30 mL out of thin serous fluid, and #2 had 40 mL out of thin serous fluid. Abdominal dressing CDI, Pt ate well today for breakfast full liquids, advanced from full liquid to mechanical soft this afternoon and did not eat so well. Pt is very sleepy this afternoon. Pt remains confused, clearer this morning, however this afternoon he seems to be getting more confused as he gets tired. Pt has been pleasant much of the day. Remains a 1:1. IV Saline locked currently. Pt has started getting a little more pushy, wanting to get up on his own, refusing help, there was 4 of us in his room to safely transfer him from the chair to the bed this afternoon just before shift change. Pt was not combatative, just needed lots of direction. Pt is still denying pain.     Face to face report given with opportunity to observe patient.    Report given to PARISA Barlow   6/7/2019  3:31 PM

## 2019-06-07 NOTE — PROGRESS NOTES
"CLINICAL NUTRITION SERVICES    Ag Perez : follow up    79 yom admitted for sepsis and small bowel perforation. 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 POD #7 of small bowel resection. Pt has been advanced to full liquids today and ate 100% of first meal.  Noted weight gain over the past few months.     Diet Order: Full liquid  Intake: 100% x1 meal on full liquid diet (cream of wheat, yogurt, milk and juice)    Height: 5' 5\"  Weight: 246 lbs 4.06 oz  Body mass index is 40.98 kg/m .  Weight Status:  Obesity Grade III BMI >40  IBWR: 111-149lb  Weight History: pt +14L per I/Os  257lb - 6/5/19  240lb - 6/1/19 - admit weight  217lb - 4/30/19  221lb - 4/16/19    Estimated Energy Needs: 2222-1569 kcals ([place] Jeor stress factor 1.1-1.3) - admit weight  Estimated Protein Needs:  grams protein (1.2-1.5 g pro/Kg)- highest IBW     Malnutrition Diagnosis: Patient does not meet two of the criteria necessary for diagnosing malnutrition.      NUTRITION DIAGNOSIS:  Inadequate oral intake related to GI dysfunction as evidenced by NPO/clear liquid diet for a week.     NUTRITION RECOMMENDATIONS  - Continue to advance diet as medically appropirate     MONITORING AND EVALUATION:  RD will monitor intake, thomas, labs         "

## 2019-06-07 NOTE — PROGRESS NOTES
[facility]    Hospitalist Progress Note    Date of Service (when I saw the patient): 06/07/2019    Assessment & Plan     Small bowel perforation (H): Presented with abdominal pain. 5/31/19 morning demonstrated acute abdomen on assessment. Dr. Vilchis was notified and he took him to OR urgently. Please see operative note.   -6/1/19- POD #1 Remains sedated,ventilated, abdomen open. NGT copious black returns, ETT minimal secretions of same color. Abdominal wound open with suction dressing with moderate amounts serosanguinous output.   -6/2/19-POD#2 Planning on second look today. Stable overnight with decreasing NGT output, abdomen soft. UO stable overnight.  -6/3/19- POD#3 small bowel resection. 6/2 he had wash out, facial closure. Please see operative note. He was extubated post op, taken off levohed and is doing extremely well. NGT in place. Pain minimal. No nausea. Epidural in place and running along with fentanyl IV PRN. UO and blood pressures have been excellent.  -6/4/19 POD#4 Still NPO.  Still with epidural in place.  -6/5/19 POD #5  NG removed, wound vac placed  -6/6/19 POD#6, working on bowel function, having agitated delirium.  Diet advancing.     Pulm: Vent post-op due to severe sepsis with respiratory compromise, open abdomen and plan to return to OR tomorrow for second look. First ABG on arrival to floor showed mixed acidosis-corrected with increase TV from 500 to 600. No known underlying chronic lung disease. Extubated post-op 6/2 and respiratory status has remained good.  Currently on 3L O2.  - patient is up 10 kg from admission  - will gently diurese to see if it will improve oxygenation     Neuro: agitated delirium, acute metabolic encephalopathy, intermittent.  Required IV haldol.  - schedule low dose seroquel bid  - prn sedation       Metabolic and respiratory acidosis: Resolved.        Severe Sepsis with septic shock (H): Resolved. Due to acute peritonitis from small bowel perforation.  Toxic appearing on first appearance with severe pain. Given 3 liter fluid bolus, started on IV Zosyn. Lactic acid was 4. To OR urgently for source control. IN pre-op patient developed hypoxia, worsened tachypnea, hypotension. He was started on neosynephrine gtt in the OR.  Levophed off post-op 6/2. Afebrile, normal WBC. Lactic acid normalized 6/2 as well.      AILEEN: Resolved.  Due to severe sepsis with end organ damage. Urine output decent through case but started dropping off post-operatively. Creatinine bumped from 1.08 to 1.29 then 1.59. Renal function back to normal 6/1.       Chronic atrial fibrillation (H): Has a pacemaker, 100% paced on telemetry monitor, has been on Xarelto in the past but was taken off of it due to GI bleeding.        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 He did not have any immediate complications during that hospital stay. He is following-up with oncology.        Cardiac pacemaker in situ     DVT Prophylaxis: Enoxaparin (Lovenox) subcutaneous  Code Status: DNR     Disposition: Expected discharge in several days with return of spontaneous gut function, resolution of encephalopathy, safe discharge, will need TCU.    Henna Macedo MD      Interval History   Patient seen at bedside, found sleeping today.  Had rather severe bout of agitated delirium requiring IV haldol last evening.    -Data reviewed today: I reviewed all new labs and imaging results over the last 24 hours. I personally reviewed no images or EKG's today.    Physical Exam   Temp: 98.4  F (36.9  C) Temp src: Tympanic BP: 147/59 Pulse: 77 Heart Rate: 70 Resp: 20 SpO2: (!) 91 % O2 Device: None (Room air) Oxygen Delivery: 3 LPM  Vitals:    06/04/19 0534 06/05/19 0500 06/06/19 0551   Weight: 114.4 kg (252 lb 3.3 oz) 117 kg (257 lb 15 oz) 111.7 kg (246 lb 4.1 oz)     Vital Signs with Ranges  Temp:  [97.6  F (36.4  C)-98.9  F (37.2  C)] 98.4  F (36.9  C)  Pulse:  [77] 77  Heart  Rate:  [70] 70  Resp:  [20] 20  BP: (126-159)/(59-70) 147/59  SpO2:  [89 %-93 %] 91 %      Intake/Output Summary (Last 24 hours) at 6/7/2019 1010  Last data filed at 6/7/2019 0941  Gross per 24 hour   Intake 2005 ml   Output 3065 ml   Net -1060 ml       Peripheral IV 07/24/15 Right Hand (Active)   Number of days: 1414       Peripheral IV 06/05/19 Right Lower forearm (Active)   Site Assessment WDL 6/7/2019  2:00 AM   Line Status Saline locked 6/7/2019  2:00 AM   Phlebitis Scale 0-->no symptoms 6/7/2019  2:00 AM   Infiltration Scale 0 6/6/2019  4:15 PM   Number of days: 2       Closed/Suction Drain 1 Right Abdomen Bulb  (Active)   Site Description [facility] 6/7/2019 12:27 AM   Dressing Status Normal: Clean, Dry & Intact 6/7/2019 12:27 AM   Drainage Appearance Serosanguenous 6/7/2019 12:27 AM   Status To bulb suction 6/7/2019 12:27 AM   Output (ml) 30 ml 6/7/2019 12:27 AM   Number of days: 5       Closed/Suction Drain 2 Left Abdomen Bulb  (Active)   Site Description [facility] 6/7/2019 12:27 AM   Dressing Status Normal: Clean, Dry & Intact 6/7/2019 12:27 AM   Drainage Appearance Serosanguenous 6/7/2019 12:27 AM   Status To bulb suction 6/7/2019 12:27 AM   Output (ml) 40 ml 6/7/2019 12:27 AM   Number of days: 5       Negative Pressure Wound Therapy Abdomen Mid (Active)   Wound Type Surgical 6/6/2019 11:00 AM   Dressing Type Black foam 6/6/2019 11:00 AM   Target Pressure (mmHg) 125 6/6/2019  1:00 PM   Dressing Status Clean, dry, intact 6/6/2019 11:00 AM   Drainage Amount Small 6/6/2019 11:00 AM   Drainage Color/Charcteristics Serosanguinous 6/6/2019 11:00 AM   Output (ml) 100 ml 6/6/2019 11:00 AM   Number of days: 2       Incision/Surgical Site 06/02/19 Abdomen (Active)   Incision Assessment Moist;Drainage 6/5/2019 10:35 PM   Magaly-Incision Assessment Erythema 6/5/2019 10:35 PM   Incision Drainage Amount Small 6/5/2019 10:35 PM   Drainage Description Serosanguinous 6/5/2019 10:35 PM   Incision Care Other (Comment) 6/5/2019  2:26 PM    Dressing Intervention New dressing applied;Sterile dressing change 6/5/2019  2:26 PM   Number of days: 5     Line/device assessment(s) completed for medical necessity    Constitutional -somewhat confused  HEENT - atraumatic, normocephalic, NG tube and NC in place  Neck - supple, no masses, no JVD  CVS - S1 S2 RRR, no murmurs, rubs, gallops  Respiratory - CTA b/l  GI - soft, NT, ND, incision c/d/i, no organomegaly  Musculoskeletal - no LE edema, no lesions  Neuro - oriented x 1, no gross focal deficits    Medications       enoxaparin  40 mg Subcutaneous Q24H     furosemide  20 mg Intravenous BID     heparin lock flush  5-10 mL Intracatheter Q24H     insulin aspart  1-6 Units Subcutaneous Q4H     levothyroxine  125 mcg Oral QAM AC     lisinopril  40 mg Oral Daily     QUEtiapine  25 mg Oral BID     tamsulosin  0.4 mg Oral QPM       Data   Recent Labs   Lab 06/07/19  0520 06/06/19  0526 06/05/19  0520  06/03/19  0450 06/02/19  0458  06/01/19  0108 05/31/19 2055 05/31/19  1759   WBC 14.1* 11.7* 9.5   < > 10.7 9.1   < >  --   --   --    HGB 12.1* 10.6* 10.5*   < > 10.4* 11.3*   < >  --   --   --    MCV 91 90 92   < > 92 92   < >  --   --   --     229 179   < > 167 175   < >  --   --   --     140 140   < > 140 139   < >  --   --  142   POTASSIUM 3.9 4.1 4.1   < > 4.3 4.0   < >  --   --  3.4   CHLORIDE 106 108 108   < > 107 106   < >  --   --  108   CO2 23 25 27   < > 28 26   < >  --   --  22   BUN 16 17 23   < > 28 26   < >  --   --  32*   CR 1.01 0.95 1.01   < > 0.96 1.02   < >  --   --  1.53*   ANIONGAP 9 7 5   < > 5 7   < >  --   --  12   ERIKA 7.9* 7.6* 7.6*   < > 7.6* 7.6*   < >  --   --  7.7*   * 136* 127*   < > 166* 185*   < >  --   --  216*   ALBUMIN  --   --   --   --  1.6* 1.7*   < >  --   --   --    PROTTOTAL  --   --   --   --  4.9* 4.9*   < >  --   --   --    BILITOTAL  --   --   --   --  1.0 1.2   < >  --   --   --    ALKPHOS  --   --   --   --  53 43   < >  --   --   --    ALT  --   --    --   --  12 10   < >  --   --   --    AST  --   --   --   --  16 12   < >  --   --   --    TROPI  --   --   --   --   --   --   --  0.066* 0.073* 0.084*    < > = values in this interval not displayed.     Lactic Acid   Date Value Ref Range Status   06/02/2019 2.0 0.7 - 2.0 mmol/L Final   06/02/2019 1.9 0.7 - 2.0 mmol/L Final   06/01/2019 2.0 0.7 - 2.0 mmol/L Final       No results found for this or any previous visit (from the past 24 hour(s)).    Henan Macedo MD

## 2019-06-07 NOTE — PROGRESS NOTES
Pt becoming increasingly agitated approx 0000, very difficult to redirect at first, then unable to redirect at all. Pt squeezing et bending staff hands threatening to break them, swinging at staff faces et almost connected a couple times, et insistent on going home. Fentanyl 50mcg given at 0050 for FLACC score of 7 with little to no relief, txt MD et received Haldol 2mg, while waiting for haldol to be approved by pharmacy Fentanyl 50mcg given at 0150 for FLACC of 5. Inprocess of agitation pulled out tubing for wound vac et unable to administer 0200 abx, did manage to patch wound vac temporarily. Pt finally resting, will hold off on haldol at this time

## 2019-06-07 NOTE — PLAN OF CARE
"Pleasant and cooperative at beginning of the shift.  Pleasantly confused.  Tolerating clear liquids.  Denied pain and nausea.  Wound vac intact to abdomen with serosanguinous drainage.  JOSE x2 present.  At 1900 staff assisting back to bed from chair.  Patient became extremely agitated - would not sit down, hitting, attempting to bite staff, swinging walker at staff.  Hallucinating - states he sees extra people in addition to staff in room.  Ambulated out in hallway attempting to \"get out of here\".  Dr. Crook called to see patient, order for haldol 2mg ordered and given.  Patient continues to be agitated.  \"You are trying to kill me\"  \"There is enough evidence that I am going to die\".  \"I am done with my life, you guys are working for [employer].  I am over that.\"  Does not redirect.  Dilaudid given IV, unsure if patient's agitation is due to discomfort and unable to verbalize.  At 2000 attempted to give Benadryl oral per MD request.  Patient continues to kick at staff, grabs staff's wrists or fingers with intent to hurt.  Spit Benadryl out at staff and bit syringe and broke it.  Entire syringe obtained from patient.  Dr. Crook again checked on patient and ordered additional Haldol.  Unable to given scheduled meds and glucose check due to agitation.  Finally asleep at 2030 in recliner.  Respirations even and unlabored.  Woke for 10-15 minutes at 2145 and asleep at 2200.  Has remained 1:1 staffing this shift.    Face to face report given with opportunity to observe patient.  Report given to PARISA Mishra.    Jennie Ramirez  6/6/2019, 11:20 PM        "

## 2019-06-07 NOTE — PLAN OF CARE
Discharge Planner OT   Patient plan for discharge: Pt does not verbalize  Current status: Supine>sit Dane. While chair was being set up, pt required Min-modA to remain sitting upright EOB. Pt transferred bed>chair with Min-modA x 2 and verbal cues for sequencing and proper hand placement. Once sitting in the chair, pt participated in sponge bathing tasks with set-up, changed into a clean gown with Dane, and applied deodorant with MaxA  Barriers to return to prior living situation: Living alone, weakness, impaired activity tolerance, fall risk, confusion   Recommendations for discharge: TCU/SNF  Rationale for recommendations: Pt continues to require staff assist x 1-2 and would not be safe to return home alone       Entered by: Nimisha Zeng 06/07/2019 12:33 PM

## 2019-06-07 NOTE — PROGRESS NOTES
[facility][1] General Surgery Progress Note         Assessment and Plan:    Assessment:   78 yo male POD #7 s/p exploratory laparotomy from small bowel perforation with fascial dehiscence and POD #5 s/p exploratory laparotomy with small bowel anastomosis and fascial closure      Plan:   Continue to ADAT  Resume home diuretics with as needed IV lasix  Will discontinue antibiotics as PCT normal   VAC change today with wound care  PT/OT  Nursing home placement     Additional problems to be followed by medicine team           Interval History:   The patient did ok overnight. Did have episode of agitated delirium requiring medication. He continues to have bowel function. He has no abdominal pain.       Significant Problems:    Active Problems:    Sepsis (H)    Chronic atrial fibrillation (H)    Malignant neoplasm of transverse colon (H)    Small bowel perforation (H)    Cardiac pacemaker in situ            Review of Systems:    The patient denies any chest pain, shortness of breath, excessive pain, fever, chills, purulent drainage from the wound, nausea or vomiting.         Medications:     All medications related to the patient's surgery have been reviewed  Current Facility-Administered Medications   Medication     glucose gel 15-30 g    Or     dextrose 50 % injection 25-50 mL    Or     glucagon injection 1 mg     diphenhydrAMINE (BENADRYL) solution 12.5 mg     enoxaparin (LOVENOX) injection 40 mg     fentaNYL (PF) (SUBLIMAZE) injection 25-50 mcg     furosemide (LASIX) injection 20 mg     haloperidol lactate (HALDOL) injection 2 mg     heparin lock flush 10 UNIT/ML injection 5-10 mL     HYDROmorphone (PF) (DILAUDID) injection 0.2 mg     insulin aspart (NovoLOG) inj (RAPID ACTING)     ipratropium - albuterol 0.5 mg/2.5 mg/3 mL (DUONEB) neb solution 3 mL     levothyroxine (SYNTHROID/LEVOTHROID) tablet 125 mcg     lidocaine (LMX4) kit     lidocaine (LMX4) kit     lidocaine 1 % 0.1-1 mL     lidocaine 1 % 0.1-1 mL      lisinopril (PRINIVIL/Zestril) tablet 40 mg     nalbuphine (NUBAIN) injection 2.5-5 mg     naloxone (NARCAN) injection 0.1-0.4 mg     naloxone (NARCAN) injection 0.1-0.4 mg     ondansetron (ZOFRAN-ODT) ODT tab 4 mg    Or     ondansetron (ZOFRAN) injection 4 mg     piperacillin-tazobactam (ZOSYN) infusion 3.375 g     sodium chloride (PF) 0.9% PF flush 10-20 mL     sodium chloride (PF) 0.9% PF flush 10-20 mL     tamsulosin (FLOMAX) capsule 0.4 mg             Physical Exam:     Vitals were reviewed  No data found.    General:  Awake, alert, responds to questions appropriately  Chest:   Clear breath sound bilaterally, no wheeze  Cardiac:  Paced regular rythym  Abdomen: wound vac intact with no air leak, JPs serosangenous  Extremities:  Pitting edema to bilateral lower extremities    # Pain Assessment:  Current Pain Score 6/7/2019   Patient currently in pain? yes   Pain score (0-10) -   Pain location -   Pain descriptors -   rFLACC pain score 4            Data:   All laboratory data related to this surgery reviewed  Results for orders placed or performed during the hospital encounter of 05/30/19 (from the past 24 hour(s))   Glucose by meter   Result Value Ref Range    Glucose 166 (H) 70 - 99 mg/dL   Glucose by meter   Result Value Ref Range    Glucose 147 (H) 70 - 99 mg/dL   Glucose by meter   Result Value Ref Range    Glucose 137 (H) 70 - 99 mg/dL   CBC with platelets   Result Value Ref Range    WBC 14.1 (H) 4.0 - 11.0 10e9/L    RBC Count 3.95 (L) 4.4 - 5.9 10e12/L    Hemoglobin 12.1 (L) 13.3 - 17.7 g/dL    Hematocrit 36.0 (L) 40.0 - 53.0 %    MCV 91 78 - 100 fl    MCH 30.6 26.5 - 33.0 pg    MCHC 33.6 31.5 - 36.5 g/dL    RDW 15.9 (H) 10.0 - 15.0 %    Platelet Count 278 150 - 450 10e9/L   CRP inflammation   Result Value Ref Range    CRP Inflammation 139.0 (H) 0.0 - 8.0 mg/L   Procalcitonin   Result Value Ref Range    Procalcitonin 2.92 ng/ml   Basic metabolic panel   Result Value Ref Range    Sodium 138 133 - 144  mmol/L    Potassium 3.9 3.4 - 5.3 mmol/L    Chloride 106 94 - 109 mmol/L    Carbon Dioxide 23 20 - 32 mmol/L    Anion Gap 9 3 - 14 mmol/L    Glucose 135 (H) 70 - 99 mg/dL    Urea Nitrogen 16 7 - 30 mg/dL    Creatinine 1.01 0.66 - 1.25 mg/dL    GFR Estimate 69 >60 mL/min/[1.73_m2]    GFR Estimate If Black 80 >60 mL/min/[1.73_m2]    Calcium 7.9 (L) 8.5 - 10.1 mg/dL         Pastor Vilchis MD

## 2019-06-08 ENCOUNTER — APPOINTMENT (OUTPATIENT)
Dept: PHYSICAL THERAPY | Facility: HOSPITAL | Age: 79
DRG: 907 | End: 2019-06-08
Payer: MEDICARE

## 2019-06-08 LAB
ANION GAP SERPL CALCULATED.3IONS-SCNC: 6 MMOL/L (ref 3–14)
BUN SERPL-MCNC: 18 MG/DL (ref 7–30)
CALCIUM SERPL-MCNC: 7.7 MG/DL (ref 8.5–10.1)
CHLORIDE SERPL-SCNC: 106 MMOL/L (ref 94–109)
CO2 SERPL-SCNC: 27 MMOL/L (ref 20–32)
CREAT SERPL-MCNC: 1.03 MG/DL (ref 0.66–1.25)
GFR SERPL CREATININE-BSD FRML MDRD: 68 ML/MIN/{1.73_M2}
GLUCOSE SERPL-MCNC: 157 MG/DL (ref 70–99)
POTASSIUM SERPL-SCNC: 3.3 MMOL/L (ref 3.4–5.3)
SODIUM SERPL-SCNC: 139 MMOL/L (ref 133–144)

## 2019-06-08 PROCEDURE — A9270 NON-COVERED ITEM OR SERVICE: HCPCS | Performed by: SURGERY

## 2019-06-08 PROCEDURE — 97530 THERAPEUTIC ACTIVITIES: CPT | Mod: GP

## 2019-06-08 PROCEDURE — 25000132 ZZH RX MED GY IP 250 OP 250 PS 637: Performed by: INTERNAL MEDICINE

## 2019-06-08 PROCEDURE — 25000128 H RX IP 250 OP 636: Performed by: SURGERY

## 2019-06-08 PROCEDURE — 40000275 ZZH STATISTIC RCP TIME EA 10 MIN

## 2019-06-08 PROCEDURE — 25000132 ZZH RX MED GY IP 250 OP 250 PS 637: Performed by: SURGERY

## 2019-06-08 PROCEDURE — 80048 BASIC METABOLIC PNL TOTAL CA: CPT | Performed by: INTERNAL MEDICINE

## 2019-06-08 PROCEDURE — A9270 NON-COVERED ITEM OR SERVICE: HCPCS | Performed by: INTERNAL MEDICINE

## 2019-06-08 PROCEDURE — 99232 SBSQ HOSP IP/OBS MODERATE 35: CPT | Performed by: INTERNAL MEDICINE

## 2019-06-08 PROCEDURE — 97110 THERAPEUTIC EXERCISES: CPT | Mod: GP

## 2019-06-08 PROCEDURE — 12000000 ZZH R&B MED SURG/OB

## 2019-06-08 PROCEDURE — 25000128 H RX IP 250 OP 636: Performed by: INTERNAL MEDICINE

## 2019-06-08 PROCEDURE — 36415 COLL VENOUS BLD VENIPUNCTURE: CPT | Performed by: INTERNAL MEDICINE

## 2019-06-08 RX ORDER — BISACODYL 10 MG
10 SUPPOSITORY, RECTAL RECTAL ONCE
Status: COMPLETED | OUTPATIENT
Start: 2019-06-08 | End: 2019-06-08

## 2019-06-08 RX ORDER — FERROUS SULFATE 325(65) MG
325 TABLET ORAL
Status: DISCONTINUED | OUTPATIENT
Start: 2019-06-08 | End: 2019-06-11 | Stop reason: HOSPADM

## 2019-06-08 RX ORDER — BENAZEPRIL HYDROCHLORIDE 20 MG/1
40 TABLET ORAL DAILY
Status: DISCONTINUED | OUTPATIENT
Start: 2019-06-08 | End: 2019-06-08

## 2019-06-08 RX ORDER — EZETIMIBE 10 MG/1
10 TABLET ORAL AT BEDTIME
Status: DISCONTINUED | OUTPATIENT
Start: 2019-06-08 | End: 2019-06-11 | Stop reason: HOSPADM

## 2019-06-08 RX ORDER — ASPIRIN 81 MG/1
81 TABLET, CHEWABLE ORAL DAILY
Status: DISCONTINUED | OUTPATIENT
Start: 2019-06-08 | End: 2019-06-11 | Stop reason: HOSPADM

## 2019-06-08 RX ORDER — ATORVASTATIN CALCIUM 10 MG/1
10 TABLET, FILM COATED ORAL EVERY EVENING
Status: DISCONTINUED | OUTPATIENT
Start: 2019-06-08 | End: 2019-06-11 | Stop reason: HOSPADM

## 2019-06-08 RX ADMIN — ENOXAPARIN SODIUM 40 MG: 40 INJECTION SUBCUTANEOUS at 14:32

## 2019-06-08 RX ADMIN — EZETIMIBE 10 MG: 10 TABLET ORAL at 20:58

## 2019-06-08 RX ADMIN — FERROUS SULFATE TAB 325 MG (65 MG ELEMENTAL FE) 325 MG: 325 (65 FE) TAB at 17:13

## 2019-06-08 RX ADMIN — LISINOPRIL 40 MG: 40 TABLET ORAL at 09:37

## 2019-06-08 RX ADMIN — TAMSULOSIN HYDROCHLORIDE 0.4 MG: 0.4 CAPSULE ORAL at 20:58

## 2019-06-08 RX ADMIN — BISACODYL 10 MG: 10 SUPPOSITORY RECTAL at 09:48

## 2019-06-08 RX ADMIN — ATORVASTATIN CALCIUM 10 MG: 10 TABLET, FILM COATED ORAL at 20:59

## 2019-06-08 RX ADMIN — LEVOTHYROXINE SODIUM 125 MCG: 100 TABLET ORAL at 06:17

## 2019-06-08 RX ADMIN — ASPIRIN 81 MG 81 MG: 81 TABLET ORAL at 09:36

## 2019-06-08 RX ADMIN — LEVOTHYROXINE SODIUM 125 MCG: 100 TABLET ORAL at 09:37

## 2019-06-08 RX ADMIN — FUROSEMIDE 20 MG: 10 INJECTION, SOLUTION INTRAVENOUS at 16:09

## 2019-06-08 RX ADMIN — QUETIAPINE FUMARATE 25 MG: 25 TABLET ORAL at 09:36

## 2019-06-08 RX ADMIN — QUETIAPINE FUMARATE 25 MG: 25 TABLET ORAL at 20:59

## 2019-06-08 RX ADMIN — FUROSEMIDE 20 MG: 10 INJECTION, SOLUTION INTRAVENOUS at 09:36

## 2019-06-08 ASSESSMENT — ACTIVITIES OF DAILY LIVING (ADL)
ADLS_ACUITY_SCORE: 16
ADLS_ACUITY_SCORE: 15
ADLS_ACUITY_SCORE: 11
ADLS_ACUITY_SCORE: 16

## 2019-06-08 NOTE — PLAN OF CARE
Face to face report given with opportunity to observe patient.    Report given to Marquita Tony RN  6/7/2019  11:19 PM

## 2019-06-08 NOTE — PROGRESS NOTES
[facility]    Hospitalist Progress Note    Date of Service (when I saw the patient): 06/08/2019    Assessment & Plan     Small bowel perforation (H): Presented with abdominal pain. 5/31/19 morning demonstrated acute abdomen on assessment. Dr. Vilchis was notified and he took him to OR urgently. Please see operative note.   -6/1/19- POD #1 Remains sedated,ventilated, abdomen open. NGT copious black returns, ETT minimal secretions of same color. Abdominal wound open with suction dressing with moderate amounts serosanguinous output.   -6/2/19-POD#2 Planning on second look today. Stable overnight with decreasing NGT output, abdomen soft. UO stable overnight.  -6/3/19- POD#3 small bowel resection. 6/2 he had wash out, facial closure. Please see operative note. He was extubated post op, taken off levohed and is doing extremely well. NGT in place. Pain minimal. No nausea. Epidural in place and running along with fentanyl IV PRN. UO and blood pressures have been excellent.  -6/4/19 POD#4 Still NPO.  Still with epidural in place.  -6/5/19 POD #5  NG removed, wound vac placed  -6/6/19 -  POD#6, working on bowel function, having agitated delirium.  Diet advancing.  -6/7/19 POD#7 started scheduled seroquel for agitation     Pulm: Extubated post-op 6/2 and respiratory status has remained good.  Currently on 3L O2.  - patient is up 10 kg from admission  - will gently diurese to see if it will improve oxygenation     Neuro: agitated delirium, acute metabolic encephalopathy, intermittent.  Required IV haldol.  - continue scheduled low dose seroquel bid  - prn sedation       Metabolic and respiratory acidosis: Resolved.        Severe Sepsis with septic shock (H): Resolved. Due to acute peritonitis from small bowel perforation. Toxic appearing initially. Given 3 liter fluid bolus, started on IV Zosyn. Lactic acid was 4. To OR urgently for source control 5/31. IN pre-op patient developed hypoxia, worsened tachypnea,  hypotension. He was started on neosynephrine gtt in the OR.  Levophed off post-op 6/2. Afebrile, normal WBC. Lactic acid normalized 6/2 as well.      AILEEN: Resolved.  Due to severe sepsis with end organ damage. Urine output decent through case but started dropping off post-operatively. Creatinine bumped from 1.08 to 1.29 then 1.59. Renal function back to normal 6/1.       Chronic atrial fibrillation (H): Has a pacemaker, 100% paced on telemetry monitor, has been on Xarelto in the past but was taken off of it due to GI bleeding.        Malignant neoplasm of transverse colon (H): S/P resection 2 months ago at [facility]. He did not have any immediate complications during that hospital stay. He is following-up with oncology.        Cardiac pacemaker in situ     DVT Prophylaxis: Enoxaparin (Lovenox) subcutaneous  Code Status: DNR     Disposition: Expected discharge in several days with return of spontaneous gut function, resolution of encephalopathy, weaning of 1:1, will need TCU.    Henna Macedo MD      Interval History   Patient seen at bedside, awake, interactive.  Agitated delirium improved.  No new symptoms.    -Data reviewed today: I reviewed all new labs and imaging results over the last 24 hours. I personally reviewed no images or EKG's today.    Physical Exam   Temp: 98.2  F (36.8  C) Temp src: Temporal BP: 115/54   Heart Rate: 74 Resp: 20 SpO2: (!) 90 % O2 Device: None (Room air)    Vitals:    06/04/19 0534 06/05/19 0500 06/06/19 0551   Weight: 114.4 kg (252 lb 3.3 oz) 117 kg (257 lb 15 oz) 111.7 kg (246 lb 4.1 oz)     Vital Signs with Ranges  Temp:  [98.2  F (36.8  C)-99.3  F (37.4  C)] 98.2  F (36.8  C)  Heart Rate:  [70-74] 74  Resp:  [16-20] 20  BP: (110-147)/(48-59) 115/54  SpO2:  [89 %-92 %] 90 %      Intake/Output Summary (Last 24 hours) at 6/8/2019 6934  Last data filed at 6/8/2019 0621  Gross per 24 hour   Intake 1883 ml   Output 3505 ml   Net -1622 ml       Peripheral IV 07/24/15 Right Hand  (Active)   Number of days: 1415       Peripheral IV 06/05/19 Right Lower forearm (Active)   Site Assessment WDL 6/8/2019  2:07 AM   Line Status Saline locked;Checked every 1-2 hour 6/8/2019  2:07 AM   Phlebitis Scale 0-->no symptoms 6/8/2019  2:07 AM   Infiltration Scale 0 6/8/2019  2:07 AM   Infiltration Site Treatment Method  None 6/7/2019  6:02 PM   Extravasation? No 6/7/2019  6:02 PM   Number of days: 3       Closed/Suction Drain 1 Right Abdomen Bulb  (Active)   Site Description UTV 6/8/2019  2:05 AM   Dressing Status Normal: Clean, Dry & Intact 6/8/2019  2:05 AM   Drainage Appearance Serous 6/8/2019  2:05 AM   Status To bulb suction 6/8/2019  2:05 AM   Output (ml) 10 ml 6/8/2019  6:21 AM   Number of days: 6       Closed/Suction Drain 2 Left Abdomen Bulb  (Active)   Site Description UTV 6/8/2019  2:05 AM   Dressing Status Normal: Clean, Dry & Intact 6/8/2019  2:05 AM   Drainage Appearance Serous 6/8/2019  2:05 AM   Status To bulb suction 6/8/2019  2:05 AM   Output (ml) 10 ml 6/8/2019  6:21 AM   Number of days: 6       Negative Pressure Wound Therapy Abdomen Mid (Active)   Wound Type Surgical 6/8/2019  5:45 AM   Dressing Type Black foam 6/8/2019  5:45 AM   Target Pressure (mmHg) 125 6/7/2019  2:34 PM   Dressing Status Clean, dry, intact 6/8/2019  5:45 AM   Drainage Amount Small 6/7/2019  2:34 PM   Drainage Color/Charcteristics Serosanguinous 6/8/2019  5:45 AM   Output (ml) 100 ml 6/8/2019  5:45 AM   Number of days: 3       Incision/Surgical Site 06/02/19 Abdomen (Active)   Incision Assessment Other (Comment) 6/8/2019  2:05 AM   Magaly-Incision Assessment Erythema 6/8/2019  2:05 AM   Incision Drainage Amount Small 6/8/2019  2:05 AM   Drainage Description Serosanguinous 6/8/2019  2:05 AM   Incision Care Other (Comment) 6/7/2019  6:03 PM   Dressing Intervention Clean, dry, intact 6/8/2019  2:05 AM   Number of days: 6     Line/device assessment(s) completed for medical necessity    Constitutional -somewhat  confused  HEENT - atraumatic, normocephalic, NG tube and NC removed  Neck - supple, no masses, no JVD  CVS - S1 S2 RRR, no murmurs, rubs, gallops  Respiratory - CTA b/l  GI - soft, NT, ND, incision c/d/i, no organomegaly  Musculoskeletal - no LE edema, no lesions  Neuro - oriented x 2, no gross focal deficits    Medications       aspirin  81 mg Oral Daily     atorvastatin  10 mg Oral QPM     enoxaparin  40 mg Subcutaneous Q24H     ezetimibe  10 mg Oral At Bedtime     ferrous sulfate  325 mg Oral Daily with supper     furosemide  20 mg Intravenous BID     insulin aspart  1-6 Units Subcutaneous Q4H     levothyroxine  125 mcg Oral QAM AC     lisinopril  40 mg Oral Daily     QUEtiapine  25 mg Oral BID     tamsulosin  0.4 mg Oral QPM       Data   Recent Labs   Lab 06/08/19  0521 06/07/19  0520 06/06/19  0526 06/05/19  0520  06/03/19  0450 06/02/19  0458   WBC  --  14.1* 11.7* 9.5   < > 10.7 9.1   HGB  --  12.1* 10.6* 10.5*   < > 10.4* 11.3*   MCV  --  91 90 92   < > 92 92   PLT  --  278 229 179   < > 167 175    138 140 140   < > 140 139   POTASSIUM 3.3* 3.9 4.1 4.1   < > 4.3 4.0   CHLORIDE 106 106 108 108   < > 107 106   CO2 27 23 25 27   < > 28 26   BUN 18 16 17 23   < > 28 26   CR 1.03 1.01 0.95 1.01   < > 0.96 1.02   ANIONGAP 6 9 7 5   < > 5 7   ERIKA 7.7* 7.9* 7.6* 7.6*   < > 7.6* 7.6*   * 135* 136* 127*   < > 166* 185*   ALBUMIN  --   --   --   --   --  1.6* 1.7*   PROTTOTAL  --   --   --   --   --  4.9* 4.9*   BILITOTAL  --   --   --   --   --  1.0 1.2   ALKPHOS  --   --   --   --   --  53 43   ALT  --   --   --   --   --  12 10   AST  --   --   --   --   --  16 12    < > = values in this interval not displayed.     Lactic Acid   Date Value Ref Range Status   06/02/2019 2.0 0.7 - 2.0 mmol/L Final   06/02/2019 1.9 0.7 - 2.0 mmol/L Final   06/01/2019 2.0 0.7 - 2.0 mmol/L Final       No results found for this or any previous visit (from the past 24 hour(s)).    Henna Macedo MD

## 2019-06-08 NOTE — PLAN OF CARE
The wound vac is leaking some air around the insertion site, we attempted to fix this problem, the supervisor did remove the leaking wafer, and replaced with fresh Tegaderm, there does seem to be a small air leak, but the drain tube is pulling fluid from the wound.

## 2019-06-08 NOTE — PLAN OF CARE
Discharge Planner PT   Patient plan for discharge: TBD  Current status: Pt in recliner at start of treatment. Transferred sit to stand with min A using FWW. Pt ambulated about 60ft with FWW limited by fatigue. Follow pt with WC for safety. PT required about 2 minute seated rest break. Pt then ambulated 60ft back to recliner with FWW and min A. Pt ambulated with slow rene, short step length, wide MIKE.   Barriers to return to prior living situation: Weakness, decreased activity tolerance, impaired gait, fall risk  Recommendations for discharge: SNF for short term rehab  Rationale for recommendations: Pt making progress but still recommend SNF       Entered by: Cristina Robins 06/08/2019 10:45 AM

## 2019-06-08 NOTE — PLAN OF CARE
At the start of the shift, patient was busy, grabbing at his wound vac tubing and attempting to pull the wound vac out, patient was also pulling at his JOSE drains, patient was redirected, and agreeable to cares, patient has been up in the chair and now back to bed, patient reported he was in pain, but was not adequately able to describe the severity, and IV dilaudid was administered at 1728, at present, patient appears content, patient has been pleasantly confused, has voided frequently,  patient does remain impulsive,  and is easily redirected, a sippy cup was given to patient with his fluids and he has had an adequate oral intake.  Patient has been frequently turned and repositioned with two to  three staff members, patient is tolerating well.

## 2019-06-08 NOTE — PLAN OF CARE
"Reason for hospital stay:  Abdominal Surgery, Small Bowel Perf, Sepsis    Most recent vitals: /54 (BP Location: Left arm)   Pulse 77   Temp 98.2  F (36.8  C) (Temporal)   Resp 20   Ht 1.651 m (5' 5\")   Wt 111.7 kg (246 lb 4.1 oz)   SpO2 (!) 89%   BMI 40.98 kg/m    Pain Management:  Denied pain throughout shift  Orientation:  Alert with confusion. Very pleasant and cooperative on 1:1  Cardiac:  HR reg  Respiratory:  LS clear. Denies sob. Cough noted  GI:  BS hypoactive. Denies nausea. Reports passing gas.  :  Voids using the urinal  Diet: Mechanical soft  Skin Issues:  Wound vac to abdomin, JOSE drains to right and left putting out serous drainage, generalized trace to mild edema, some blanchable redness to the coccyx/sacrum area, TRQ2 and Pt sits on the edge of the bed at times also shifts own weight  IVF:  IV SL  ABX:  None given  Mobility:  A2  Safety:  1:1 with call light available    Comments:    6/8/2019  4:33 AM  Marquita Mabry    "

## 2019-06-08 NOTE — PLAN OF CARE
Face to face report given with opportunity to observe patient.    Report given to PARISA Matthews   6/8/2019  7:29 AM

## 2019-06-08 NOTE — PLAN OF CARE
A fresh cannister was placed on the patients wound vac, the apparatus continues to alarm, will update surgeon.

## 2019-06-08 NOTE — PLAN OF CARE
"/64 (BP Location: Left arm)   Pulse 77   Temp 98.3  F (36.8  C) (Temporal)   Resp 14   Ht 1.651 m (5' 5\")   Wt 111.7 kg (246 lb 4.1 oz)   SpO2 92%   BMI 40.98 kg/m      Pt alert and oriented to self and place. BS active, passed 1x formed large stool. Denies pain. Denies nausea. Wound vac in place. JOSE drain sites WDL. Drains accumulating small amounts of serosanguinous fluids. Voiding well. Eating well. Lungs clear. HR regular. No behaviors noted, will use call light appropriately. Taken off of 1:1 this shift. Ax1 from bed/chair, tolerates well. No falls or injuries this shift. Will continue to monitor.     Face to face report given with opportunity to observe patient.    Report given to PARISA Barlow   6/8/2019  3:19 PM      "

## 2019-06-08 NOTE — PLAN OF CARE
Patient took his oral medication well with no difficulty, patient denies pain at present, and is easily redirectable.

## 2019-06-08 NOTE — PROGRESS NOTES
[facility] Progress Note    S: Dong well, no complaints this am.     # Pain Assessment:  Current Pain Score 2019   Patient currently in pain? denies   Pain score (0-10) -   Pain location -   Pain descriptors -   rFLACC pain score -   Ag dutta pain level was assessed and he currently denies pain.      O:   Vitals:  BP  Min: 110/48  Max: 140/64  Temp  Av.6  F (37  C)  Min: 98.2  F (36.8  C)  Max: 99.3  F (37.4  C)  I/O last 3 completed shifts:  In:  [P.O.:]  Out: 3505 [Urine:3150; Drains:355]    Peripheral IV 07/24/15 Right Hand (Active)   Number of days: 1415       Peripheral IV 19 Right Lower forearm (Active)   Site Assessment [facility] 2019  8:00 AM   Line Status Saline locked 2019  8:00 AM   Phlebitis Scale 0-->no symptoms 2019  8:00 AM   Infiltration Scale 0 2019  8:00 AM   Infiltration Site Treatment Method  None 2019  6:02 PM   Extravasation? No 2019  6:02 PM   Number of days: 3       Closed/Suction Drain 1 Right Abdomen Bulb  (Active)   Site Description [facility] 2019  8:00 AM   Dressing Status Normal: Clean, Dry & Intact 2019  8:00 AM   Drainage Appearance Serous 2019  2:05 AM   Status To bulb suction 2019  2:05 AM   Output (ml) 10 ml 2019  6:21 AM   Number of days: 6       Closed/Suction Drain 2 Left Abdomen Bulb  (Active)   Site Description [facility] 2019  8:00 AM   Dressing Status Normal: Clean, Dry & Intact 2019  8:00 AM   Drainage Appearance Serous 2019  2:05 AM   Status To bulb suction 2019  2:05 AM   Output (ml) 10 ml 2019  6:21 AM   Number of days: 6       Negative Pressure Wound Therapy Abdomen Mid (Active)   Wound Type Surgical 2019  5:45 AM   Dressing Type Black foam 2019  5:45 AM   Target Pressure (mmHg) 125 2019  2:34 PM   Dressing Status Clean, dry, intact 2019  5:45 AM   Drainage Amount Small 2019  2:34 PM   Drainage Color/Charcteristics Serosanguinous 2019  5:45 AM   Output (ml) 100 ml 2019  5:45  AM   Number of days: 3       Incision/Surgical Site 06/02/19 Abdomen (Active)   Incision Assessment UTV 6/8/2019  8:00 AM   Magaly-Incision Assessment UTV 6/8/2019  8:00 AM   Incision Drainage Amount Small 6/8/2019  2:05 AM   Drainage Description UTV 6/8/2019  8:00 AM   Incision Care Other (Comment) 6/7/2019  6:03 PM   Dressing Intervention Clean, dry, intact 6/8/2019  2:05 AM   Number of days: 6     No line/device    Physical Exam:  General: alert oriented to person, remotely to place not time.    ENT: sclera non-icteric   Pulmonary: no respiratory distress   CVS: irregular rhythm   ABD: soft non-tender non-distended, wound vac in place, drain output serous  Extremities: WWP     Assessment/Plan:  80 yo male POD #8 s/p exploratory laparotomy from small bowel perforation with fascial dehiscence and POD #6 s/p exploratory laparotomy with small bowel anastomosis and fascial closure    Afebrile, vital signs stable, delirium persists worse in the evenings now on seroquel.   No documented bowel movements will trial suppository.   Awaiting placement at this point.     Jose Rodriges

## 2019-06-09 PROCEDURE — 25000132 ZZH RX MED GY IP 250 OP 250 PS 637: Performed by: SURGERY

## 2019-06-09 PROCEDURE — A9270 NON-COVERED ITEM OR SERVICE: HCPCS | Performed by: SURGERY

## 2019-06-09 PROCEDURE — A9270 NON-COVERED ITEM OR SERVICE: HCPCS | Performed by: INTERNAL MEDICINE

## 2019-06-09 PROCEDURE — 25000128 H RX IP 250 OP 636: Performed by: INTERNAL MEDICINE

## 2019-06-09 PROCEDURE — 25000128 H RX IP 250 OP 636: Performed by: SURGERY

## 2019-06-09 PROCEDURE — 99232 SBSQ HOSP IP/OBS MODERATE 35: CPT | Performed by: INTERNAL MEDICINE

## 2019-06-09 PROCEDURE — 25000132 ZZH RX MED GY IP 250 OP 250 PS 637: Performed by: INTERNAL MEDICINE

## 2019-06-09 PROCEDURE — 40000275 ZZH STATISTIC RCP TIME EA 10 MIN

## 2019-06-09 PROCEDURE — 2W13X6Z COMPRESSION OF ABDOMINAL WALL USING PRESSURE DRESSING: ICD-10-PCS | Performed by: SURGERY

## 2019-06-09 PROCEDURE — 12000000 ZZH R&B MED SURG/OB

## 2019-06-09 RX ORDER — QUETIAPINE FUMARATE 25 MG/1
50 TABLET, FILM COATED ORAL EVERY EVENING
Status: DISCONTINUED | OUTPATIENT
Start: 2019-06-09 | End: 2019-06-11 | Stop reason: HOSPADM

## 2019-06-09 RX ORDER — QUETIAPINE FUMARATE 25 MG/1
25 TABLET, FILM COATED ORAL DAILY
Status: DISCONTINUED | OUTPATIENT
Start: 2019-06-09 | End: 2019-06-11 | Stop reason: HOSPADM

## 2019-06-09 RX ADMIN — ATORVASTATIN CALCIUM 10 MG: 10 TABLET, FILM COATED ORAL at 20:54

## 2019-06-09 RX ADMIN — TAMSULOSIN HYDROCHLORIDE 0.4 MG: 0.4 CAPSULE ORAL at 20:54

## 2019-06-09 RX ADMIN — EZETIMIBE 10 MG: 10 TABLET ORAL at 20:54

## 2019-06-09 RX ADMIN — LISINOPRIL 40 MG: 40 TABLET ORAL at 08:54

## 2019-06-09 RX ADMIN — ENOXAPARIN SODIUM 40 MG: 40 INJECTION SUBCUTANEOUS at 13:12

## 2019-06-09 RX ADMIN — LEVOTHYROXINE SODIUM 125 MCG: 100 TABLET ORAL at 06:43

## 2019-06-09 RX ADMIN — FERROUS SULFATE TAB 325 MG (65 MG ELEMENTAL FE) 325 MG: 325 (65 FE) TAB at 17:09

## 2019-06-09 RX ADMIN — ASPIRIN 81 MG 81 MG: 81 TABLET ORAL at 08:54

## 2019-06-09 RX ADMIN — QUETIAPINE FUMARATE 50 MG: 25 TABLET ORAL at 18:56

## 2019-06-09 ASSESSMENT — ACTIVITIES OF DAILY LIVING (ADL)
ADLS_ACUITY_SCORE: 15

## 2019-06-09 ASSESSMENT — MIFFLIN-ST. JEOR: SCORE: 1657.88

## 2019-06-09 NOTE — PROGRESS NOTES
[facility]    Hospitalist Progress Note    Date of Service (when I saw the patient): 06/09/2019    Assessment & Plan     Small bowel perforation (H): Presented with abdominal pain. 5/31/19 morning demonstrated acute abdomen on assessment. Dr. Vilchis was notified and he took him to OR urgently. Please see operative note.   -6/1/19- POD #1 Remains sedated,ventilated, abdomen open. NGT copious black returns, ETT minimal secretions of same color. Abdominal wound open with suction dressing with moderate amounts serosanguinous output.   -6/2/19-POD#2 Planning on second look today. Stable overnight with decreasing NGT output, abdomen soft. UO stable overnight.  -6/3/19- POD#3 small bowel resection. 6/2 he had wash out, facial closure. Please see operative note. He was extubated post op, taken off levohed and is doing extremely well. NGT in place. Pain minimal. No nausea. Epidural in place and running along with fentanyl IV PRN. UO and blood pressures have been excellent.  -6/4/19 POD#4 Still NPO.  Still with epidural in place.  -6/5/19 POD #5  NG removed, wound vac placed  -6/6/19 -  POD#6, working on bowel function, having agitated delirium.  Diet advancing.  -6/7/19 POD#7 started scheduled seroquel for agitation  -6/9/19 POD#9 agitation has improved, awaiting placement     Pulm: Extubated post-op 6/2 and respiratory status has remained good.  Currently weaned off O2.  - patient's fluid balance from admission is almost even  - stop diuresis     Neuro: agitated delirium, acute metabolic encephalopathy, intermittent.  Required IV haldol.  - increased evening seroquel dose, continue daytime seroquel  - prn sedation       Metabolic and respiratory acidosis: Resolved.        Severe Sepsis with septic shock (H): Resolved. Due to acute peritonitis from small bowel perforation. Toxic appearing initially. Given 3 liter fluid bolus, started on IV Zosyn. Lactic acid was 4. To OR urgently for source control 5/31. IN  pre-op patient developed hypoxia, worsened tachypnea, hypotension. He was started on neosynephrine gtt in the OR.  Levophed off post-op 6/2. Afebrile, normal WBC. Lactic acid normalized 6/2 as well.      AILEEN: Resolved.  Due to severe sepsis with end organ damage. Urine output decent through case but started dropping off post-operatively. Creatinine bumped from 1.08 to 1.29 then 1.59. Renal function back to normal 6/1.       Chronic atrial fibrillation (H): Has a pacemaker, 100% paced on telemetry monitor, has been on Xarelto in the past but was taken off of it due to GI bleeding.        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 He did not have any immediate complications during that hospital stay. He is following-up with oncology.        Cardiac pacemaker in situ     DVT Prophylaxis: Enoxaparin (Lovenox) subcutaneous  Code Status: DNR     Disposition: Expected discharge when placement available.    Henna Maecdo MD      Interval History   Patient seen at bedside, awake, interactive.  Agitated delirium improved.  No new symptoms.  Eating well.    -Data reviewed today: I reviewed all new labs and imaging results over the last 24 hours. I personally reviewed no images or EKG's today.    Physical Exam   Temp: 98.3  F (36.8  C) Temp src: Temporal BP: (!) 107/47   Heart Rate: 64 Resp: 14 SpO2: 92 % O2 Device: None (Room air)    Vitals:    06/05/19 0500 06/06/19 0551 06/09/19 0428   Weight: 117 kg (257 lb 15 oz) 111.7 kg (246 lb 4.1 oz) 102.6 kg (226 lb 3.1 oz)     Vital Signs with Ranges  Temp:  [98.3  F (36.8  C)-100.1  F (37.8  C)] 98.3  F (36.8  C)  Heart Rate:  [58-64] 64  Resp:  [14-18] 14  BP: (107-141)/(47-55) 107/47  SpO2:  [90 %-93 %] 92 %      Intake/Output Summary (Last 24 hours) at 6/9/2019 1128  Last data filed at 6/9/2019 0736  Gross per 24 hour   Intake 480 ml   Output 1400 ml   Net -920 ml           Peripheral IV 07/24/15 Right Hand (Active)   Number of  days: 1416       Peripheral IV 06/05/19 Right Lower forearm (Active)   Site Assessment [facility] 6/8/2019  8:00 AM   Line Status Saline locked 6/8/2019  8:00 AM   Phlebitis Scale 0-->no symptoms 6/8/2019  8:00 AM   Infiltration Scale 0 6/8/2019  8:00 AM   Infiltration Site Treatment Method  None 6/7/2019  6:02 PM   Extravasation? No 6/7/2019  6:02 PM   Number of days: 4       Closed/Suction Drain 1 Right Abdomen Bulb  (Active)   Site Description [facility] 6/8/2019 11:49 PM   Dressing Status Normal: Clean, Dry & Intact 6/8/2019 11:49 PM   Drainage Appearance Serous 6/9/2019  6:30 AM   Status To bulb suction 6/8/2019 11:49 PM   Output (ml) 5 ml 6/9/2019  6:30 AM   Number of days: 7       Closed/Suction Drain 2 Left Abdomen Bulb  (Active)   Site Description [facility] 6/8/2019 11:49 PM   Dressing Status Normal: Clean, Dry & Intact 6/8/2019 11:49 PM   Drainage Appearance Serous 6/9/2019  6:30 AM   Status To bulb suction 6/8/2019 11:49 PM   Output (ml) 20 ml 6/9/2019  6:30 AM   Number of days: 7       Negative Pressure Wound Therapy Abdomen Mid (Active)   Wound Type Surgical 6/8/2019  5:45 AM   Dressing Type Black foam 6/8/2019  5:45 AM   Target Pressure (mmHg) 125 6/7/2019  2:34 PM   Dressing Status Clean, dry, intact 6/8/2019  5:45 AM   Drainage Amount Small 6/7/2019  2:34 PM   Drainage Color/Charcteristics Creamy 6/8/2019  2:38 PM   Output (ml) 100 ml 6/8/2019  2:38 PM   Number of days: 4       Incision/Surgical Site 06/02/19 Abdomen (Active)   Incision Assessment [facility] 6/8/2019 11:49 PM   Magaly-Incision Assessment [facility] 6/8/2019 11:49 PM   Incision Drainage Amount None 6/8/2019 11:49 PM   Drainage Description [facility] 6/8/2019 11:49 PM   Incision Care Other (Comment) 6/8/2019 11:49 PM   Dressing Intervention Clean, dry, intact 6/8/2019 11:49 PM   Number of days: 7     Line/device assessment(s) completed for medical necessity    Constitutional -somewhat confused  HEENT - atraumatic, normocephalic, NG tube and NC removed  Neck - supple, no masses,  no JVD  CVS - S1 S2 RRR, no murmurs, rubs, gallops  Respiratory - CTA b/l  GI - soft, NT, ND, incision c/d/i, no organomegaly  Musculoskeletal - no LE edema, no lesions  Neuro - oriented x 2, no gross focal deficits    Medications       aspirin  81 mg Oral Daily     atorvastatin  10 mg Oral QPM     enoxaparin  40 mg Subcutaneous Q24H     ezetimibe  10 mg Oral At Bedtime     ferrous sulfate  325 mg Oral Daily with supper     furosemide  20 mg Intravenous BID     levothyroxine  125 mcg Oral QAM AC     lisinopril  40 mg Oral Daily     QUEtiapine  25 mg Oral Daily     QUEtiapine  50 mg Oral QPM     tamsulosin  0.4 mg Oral QPM       Data   Recent Labs   Lab 06/08/19  0521 06/07/19  0520 06/06/19  0526 06/05/19  0520 06/03/19  0450   WBC  --  14.1* 11.7* 9.5   < > 10.7   HGB  --  12.1* 10.6* 10.5*   < > 10.4*   MCV  --  91 90 92   < > 92   PLT  --  278 229 179   < > 167    138 140 140   < > 140   POTASSIUM 3.3* 3.9 4.1 4.1   < > 4.3   CHLORIDE 106 106 108 108   < > 107   CO2 27 23 25 27   < > 28   BUN 18 16 17 23   < > 28   CR 1.03 1.01 0.95 1.01   < > 0.96   ANIONGAP 6 9 7 5   < > 5   ERIKA 7.7* 7.9* 7.6* 7.6*   < > 7.6*   * 135* 136* 127*   < > 166*   ALBUMIN  --   --   --   --   --  1.6*   PROTTOTAL  --   --   --   --   --  4.9*   BILITOTAL  --   --   --   --   --  1.0   ALKPHOS  --   --   --   --   --  53   ALT  --   --   --   --   --  12   AST  --   --   --   --   --  16    < > = values in this interval not displayed.     Lactic Acid   Date Value Ref Range Status   06/02/2019 2.0 0.7 - 2.0 mmol/L Final   06/02/2019 1.9 0.7 - 2.0 mmol/L Final   06/01/2019 2.0 0.7 - 2.0 mmol/L Final       No results found for this or any previous visit (from the past 24 hour(s)).    Henna Macedo MD

## 2019-06-09 NOTE — PLAN OF CARE
Patient has been somewhat restless and confused thought the night, patient is impulsive, and has gone from the bed to the chair and vice versa, patient has urinated frequently, did have several bowel movements on the afternoon shift, denies pain and discomfort, the wound vac is clamped, and patient has not attempted to pull at it to this point, the urmila drains have drained only about 10 ml of serous fluid and the insertion sites are clean dry intact. Patient is pleasantly redirectable.

## 2019-06-09 NOTE — PLAN OF CARE
"BP (!) 107/47 (BP Location: Left arm)   Pulse 77   Temp 98.3  F (36.8  C) (Temporal)   Resp 14   Ht 1.651 m (5' 5\")   Wt 102.6 kg (226 lb 3.1 oz)   SpO2 92%   BMI 37.64 kg/m      Pt alert and oriented upon initial assessment. BS active, passing gas. No nausea, no pain. Wound vac dressing changed by surgeon, incision WDL. Small amounts of serosanguinous drainage noted. JOSE 2x draining serosanguinous fluid as well, sites WDL. Lungs clear. HR regular. IV infiltrated, discontinued IV Lasix this shift. Up in chair most of shift. Ambulated in garcia, tolerated very well. Ate well. No behaviors noted this shift, uses call light appropriately. No falls or injuries this shift. Will continue to monitor.       Face to face report given with opportunity to observe patient.    Report given to PARISA Spence   6/9/2019  3:20 PM        "

## 2019-06-09 NOTE — PLAN OF CARE
Face to face report given with opportunity to observe patient.    Report given to Cassie Tony RN  6/9/2019  7:20 AM

## 2019-06-09 NOTE — PLAN OF CARE
Patient did again impulsively get up and attempted to get out of bed, and at this time, patient completely dislodged his wound vac apparatus, the open area was covered with Tegaderm to keep the area clean, patient denies pain.

## 2019-06-09 NOTE — PROGRESS NOTES
[facility] Progress Note    S: Bowel movement yesterday, wound vac discontinued overnight as it had malfunctioned, made aware that wound vac was not changed on Friday. No pain, less agitated.     # Pain Assessment:  Current Pain Score 2019   Patient currently in pain? denies   Pain score (0-10) -   Pain location -   Pain descriptors -   rFLACC pain score -   Ag dutta pain level was assessed and he currently denies pain.      O:   Vitals:  BP  Min: 110/48  Max: 140/64  Temp  Av.6  F (37  C)  Min: 98.2  F (36.8  C)  Max: 99.3  F (37.4  C)  I/O last 3 completed shifts:  In: 840 [P.O.:840]  Out: 1400 [Urine:1225; Drains:175]    Peripheral IV 07/24/15 Right Hand (Active)   Number of days: 1416       Peripheral IV 19 Right Lower forearm (Active)   Site Assessment [facility] 2019  8:00 AM   Line Status Saline locked 2019  8:00 AM   Phlebitis Scale 0-->no symptoms 2019  8:00 AM   Infiltration Scale 0 2019  8:00 AM   Infiltration Site Treatment Method  None 2019  6:02 PM   Extravasation? No 2019  6:02 PM   Number of days: 4       Closed/Suction Drain 1 Right Abdomen Bulb  (Active)   Site Description [facility] 2019 11:49 PM   Dressing Status Normal: Clean, Dry & Intact 2019 11:49 PM   Drainage Appearance Serous 2019  6:30 AM   Status To bulb suction 2019 11:49 PM   Output (ml) 5 ml 2019  6:30 AM   Number of days: 7       Closed/Suction Drain 2 Left Abdomen Bulb  (Active)   Site Description [facility] 2019 11:49 PM   Dressing Status Normal: Clean, Dry & Intact 2019 11:49 PM   Drainage Appearance Serous 2019  6:30 AM   Status To bulb suction 2019 11:49 PM   Output (ml) 20 ml 2019  6:30 AM   Number of days: 7       Negative Pressure Wound Therapy Abdomen Mid (Active)   Wound Type Surgical 2019  5:45 AM   Dressing Type Black foam 2019  5:45 AM   Target Pressure (mmHg) 125 2019  2:34 PM   Dressing Status Clean, dry, intact 2019  5:45 AM   Drainage Amount  Small 6/7/2019  2:34 PM   Drainage Color/Charcteristics Creamy 6/8/2019  2:38 PM   Output (ml) 100 ml 6/8/2019  2:38 PM   Number of days: 4       Incision/Surgical Site 06/02/19 Abdomen (Active)   Incision Assessment [facility] 6/8/2019 11:49 PM   Magaly-Incision Assessment [facility] 6/8/2019 11:49 PM   Incision Drainage Amount None 6/8/2019 11:49 PM   Drainage Description [facility] 6/8/2019 11:49 PM   Incision Care Other (Comment) 6/8/2019 11:49 PM   Dressing Intervention Clean, dry, intact 6/8/2019 11:49 PM   Number of days: 7     No line/device    Physical Exam:  General: alert oriented to person, remotely to place not time.    ENT: sclera non-icteric   Pulmonary: no respiratory distress   CVS: irregular rhythm   ABD: Abdominal floor intact.   Extremities: WWP     Assessment/Plan:  80 yo male POD #9 s/p exploratory laparotomy from small bowel perforation with fascial dehiscence and POD #7 s/p exploratory laparotomy with small bowel anastomosis and fascial closure    Low grade temps, wound vac changed at bedside with one sponge. Previous sponge saturated which likely lead to failure of last one. Awaiting placement at this time.      Jose Rodriges

## 2019-06-09 NOTE — PROVIDER NOTIFICATION
Notified Surgeon of pts tube from wound vac came disconnected at the abdominal site.  Surgeon says to shut wound vac off and he will replace it in the morning.  He also said if we could remove the tegaderm and foam sponge from abd clean with NS and redress with wet to dry dressing/kerlix and cover with abd pad then he will still replace wound vac in a.m unless he is in the building again tonite.

## 2019-06-09 NOTE — PLAN OF CARE
Patient has been pleasant, agreeable and cooperative, patient has made his needs known adequately, the wound vac has been problematic today, and MD updated via message. Patient has shown no signs of negative behaviors, patient tolerating oral intake well, the sippy cup has helped prevent aspiration, patient swallows pills very well with no difficulty.

## 2019-06-09 NOTE — PLAN OF CARE
At present time, patient is very confused, trying to get out of bed, is not oriented to person, time or situation, patient wants his truck, and stated he's ready to drive home. The wound vac became dislodged again, the charge nurse did contact Dr. Rodriges, orders received.

## 2019-06-10 ENCOUNTER — APPOINTMENT (OUTPATIENT)
Dept: OCCUPATIONAL THERAPY | Facility: HOSPITAL | Age: 79
DRG: 907 | End: 2019-06-10
Payer: MEDICARE

## 2019-06-10 ENCOUNTER — APPOINTMENT (OUTPATIENT)
Dept: PHYSICAL THERAPY | Facility: HOSPITAL | Age: 79
DRG: 907 | End: 2019-06-10
Payer: MEDICARE

## 2019-06-10 PROCEDURE — 97110 THERAPEUTIC EXERCISES: CPT | Mod: GP

## 2019-06-10 PROCEDURE — 12000000 ZZH R&B MED SURG/OB

## 2019-06-10 PROCEDURE — 25000132 ZZH RX MED GY IP 250 OP 250 PS 637: Performed by: INTERNAL MEDICINE

## 2019-06-10 PROCEDURE — 25000128 H RX IP 250 OP 636: Performed by: SURGERY

## 2019-06-10 PROCEDURE — 97530 THERAPEUTIC ACTIVITIES: CPT | Mod: GP

## 2019-06-10 PROCEDURE — A9270 NON-COVERED ITEM OR SERVICE: HCPCS | Performed by: SURGERY

## 2019-06-10 PROCEDURE — A9270 NON-COVERED ITEM OR SERVICE: HCPCS | Performed by: INTERNAL MEDICINE

## 2019-06-10 PROCEDURE — 97530 THERAPEUTIC ACTIVITIES: CPT | Mod: GO

## 2019-06-10 PROCEDURE — 99232 SBSQ HOSP IP/OBS MODERATE 35: CPT | Performed by: INTERNAL MEDICINE

## 2019-06-10 PROCEDURE — 25000132 ZZH RX MED GY IP 250 OP 250 PS 637: Performed by: SURGERY

## 2019-06-10 PROCEDURE — 40000275 ZZH STATISTIC RCP TIME EA 10 MIN

## 2019-06-10 RX ADMIN — EZETIMIBE 10 MG: 10 TABLET ORAL at 21:17

## 2019-06-10 RX ADMIN — TAMSULOSIN HYDROCHLORIDE 0.4 MG: 0.4 CAPSULE ORAL at 21:22

## 2019-06-10 RX ADMIN — LEVOTHYROXINE SODIUM 125 MCG: 100 TABLET ORAL at 05:12

## 2019-06-10 RX ADMIN — QUETIAPINE FUMARATE 50 MG: 25 TABLET ORAL at 21:18

## 2019-06-10 RX ADMIN — FERROUS SULFATE TAB 325 MG (65 MG ELEMENTAL FE) 325 MG: 325 (65 FE) TAB at 17:41

## 2019-06-10 RX ADMIN — LISINOPRIL 40 MG: 40 TABLET ORAL at 08:04

## 2019-06-10 RX ADMIN — ASPIRIN 81 MG 81 MG: 81 TABLET ORAL at 08:04

## 2019-06-10 RX ADMIN — ATORVASTATIN CALCIUM 10 MG: 10 TABLET, FILM COATED ORAL at 21:17

## 2019-06-10 RX ADMIN — ENOXAPARIN SODIUM 40 MG: 40 INJECTION SUBCUTANEOUS at 13:14

## 2019-06-10 RX ADMIN — QUETIAPINE FUMARATE 25 MG: 25 TABLET ORAL at 08:04

## 2019-06-10 ASSESSMENT — ACTIVITIES OF DAILY LIVING (ADL)
ADLS_ACUITY_SCORE: 16
ADLS_ACUITY_SCORE: 16
ADLS_ACUITY_SCORE: 18
ADLS_ACUITY_SCORE: 16
ADLS_ACUITY_SCORE: 18
ADLS_ACUITY_SCORE: 15

## 2019-06-10 ASSESSMENT — MIFFLIN-ST. JEOR: SCORE: 1645.88

## 2019-06-10 NOTE — PLAN OF CARE
Temp: 98.8  F (37.1  C) Temp src: Temporal BP: 124/53   Heart Rate: 60 Resp: 16 SpO2: 94 % O2 Device: None (Room air)   alert, at times confused regarding time and situation.  No c/o pain. No nausea or vomiting, BS active and passing gas,no BM this shift, up to commode Ax1 with walker and gait belt. Up in chair and sitting up in bed most of shift.  Abd dressing CDI, wound vac in place, Abdelrahman's draining serous drainage. Alarms on.     Face to face report given with opportunity to observe patient.    Report given to Danica Schilling   6/9/2019  11:08 PM

## 2019-06-10 NOTE — PLAN OF CARE
Denies pain.  Pleasantly confused to time.  Aware his is at the hospital and has had recent surgeries.  Worked with PT and OT.  Transfers with SBA to assist of 1.  Up to chair for lunch.  Edema to BLE.  Wound vac intact to abdomen, 100ml of output in canister however not marked from previous shifts.  JPx2 with serous drainage - (A) scant output, (B) 10cc.  Reports passing flatus.  Bowel sounds hypoactive.  Up to BR with therapies to void.  Does IS with encouragement.    Face to face report given with opportunity to observe patient.    Report given to Brian Ramirez   6/10/2019  3:19 PM

## 2019-06-10 NOTE — PROGRESS NOTES
[facility]    Hospitalist Progress Note    Date of Service (when I saw the patient): 06/10/2019    Assessment & Plan     Small bowel perforation (H): Presented with abdominal pain. 5/31/19 morning demonstrated acute abdomen on assessment. Dr. Vilchis was notified and he took him to OR urgently. Please see operative note.   -6/1/19- POD #1 Remains sedated,ventilated, abdomen open. NGT copious black returns, ETT minimal secretions of same color. Abdominal wound open with suction dressing with moderate amounts serosanguinous output.   -6/2/19-POD#2 Planning on second look today. Stable overnight with decreasing NGT output, abdomen soft. UO stable overnight.  -6/3/19- POD#3 small bowel resection. 6/2 he had wash out, facial closure. Please see operative note. He was extubated post op, taken off levohed and is doing extremely well. NGT in place. Pain minimal. No nausea. Epidural in place and running along with fentanyl IV PRN. UO and blood pressures have been excellent.  -6/4/19 POD#4 Still NPO.  Still with epidural in place.  -6/5/19 POD #5  NG removed, wound vac placed  -6/6/19 -  POD#6, working on bowel function, having agitated delirium.  Diet advancing.  -6/7/19 POD#7 started scheduled seroquel for agitation  -6/9/19 POD#9 agitation has improved, awaiting placement  -6/10/19 POD #10 agitation resolved, doing well, awaiting placmeent     Pulm: Extubated post-op 6/2 and respiratory status has remained good.  Currently weaned off O2.  - patient's fluid balance from admission is almost even  - stopped diuresis 6/9    Neuro: agitated delirium, acute metabolic encephalopathy, intermittent.  Required IV haldol.  - resolved now  - continuing seroquel dose       Metabolic and respiratory acidosis: Resolved.        Severe Sepsis with septic shock (H): Resolved. Due to acute peritonitis from small bowel perforation. Toxic appearing initially. Given 3 liter fluid bolus, started on IV Zosyn. Lactic acid was 4. To  OR urgently for source control 5/31. IN pre-op patient developed hypoxia, worsened tachypnea, hypotension. He was started on neosynephrine gtt in the OR.  Levophed off post-op 6/2. Afebrile, normal WBC. Lactic acid normalized 6/2 as well.      AILEEN: Resolved.  Due to severe sepsis with end organ damage. Urine output decent through case but started dropping off post-operatively. Creatinine bumped from 1.08 to 1.29 then 1.59. Renal function back to normal 6/1.       Chronic atrial fibrillation (H): Has a pacemaker, 100% paced on telemetry monitor, has been on Xarelto in the past but was taken off of it due to GI bleeding.        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 He did not have any immediate complications during that hospital stay. He is following-up with oncology.        Cardiac pacemaker in situ     DVT Prophylaxis: Enoxaparin (Lovenox) subcutaneous  Code Status: DNR     Disposition: Expected discharge when placement available, medically stable for discharge.    Henna Macedo MD      Interval History   Patient seen at bedside, awake, interactive.  Patient reports having a good night.  Confused at time but no more agitation last night.  Eating well.    -Data reviewed today: I reviewed all new labs and imaging results over the last 24 hours. I personally reviewed no images or EKG's today.    Physical Exam   Temp: 98.8  F (37.1  C) Temp src: Tympanic BP: (!) 138/48   Heart Rate: 61 Resp: 18 SpO2: 92 % O2 Device: None (Room air)    Vitals:    06/06/19 0551 06/09/19 0428 06/10/19 0512   Weight: 111.7 kg (246 lb 4.1 oz) 102.6 kg (226 lb 3.1 oz) 101.4 kg (223 lb 8.7 oz)     Vital Signs with Ranges  Temp:  [98.8  F (37.1  C)] 98.8  F (37.1  C)  Heart Rate:  [60-61] 61  Resp:  [16-18] 18  BP: (124-151)/(48-53) 138/48  SpO2:  [91 %-94 %] 92 %        Intake/Output Summary (Last 24 hours) at 6/10/2019 0850  Last data filed at 6/10/2019 0600  Gross per 24 hour   Intake  120 ml   Output 1600 ml   Net -1480 ml       Peripheral IV 07/24/15 Right Hand (Active)   Number of days: 1417       Closed/Suction Drain 1 Right Abdomen Bulb  (Active)   Site Description [facility] 6/10/2019  2:08 AM   Dressing Status Normal: Clean, Dry & Intact 6/10/2019  2:08 AM   Drainage Appearance Serous 6/10/2019  2:08 AM   Status To bulb suction 6/10/2019  2:08 AM   Output (ml) 10 ml 6/10/2019  5:21 AM   Number of days: 8       Closed/Suction Drain 2 Left Abdomen Bulb  (Active)   Site Description [facility] 6/10/2019  2:08 AM   Dressing Status Normal: Clean, Dry & Intact 6/10/2019  2:08 AM   Drainage Appearance Serous 6/10/2019  2:08 AM   Status To bulb suction 6/10/2019  2:08 AM   Output (ml) 20 ml 6/10/2019  5:21 AM   Number of days: 8       Negative Pressure Wound Therapy Abdomen Mid (Active)   Wound Type Surgical 6/9/2019  3:33 PM   Dressing Type Black foam 6/9/2019  3:33 PM   Number of pieces used 1 6/9/2019  2:00 PM   Cycle Intermittent 6/9/2019  3:33 PM   Target Pressure (mmHg) 125 6/9/2019  3:33 PM   Dressing Status Clean, dry, intact 6/9/2019  3:33 PM   Drainage Amount Small 6/9/2019  2:00 PM   Drainage Color/Charcteristics Serosanguinous 6/9/2019  2:00 PM   Output (ml) 100 ml 6/8/2019  2:38 PM   Number of days: 5       Incision/Surgical Site 06/02/19 Abdomen (Active)   Incision Assessment [facility] 6/10/2019  2:08 AM   Magaly-Incision Assessment [facility] 6/10/2019  2:08 AM   Incision Drainage Amount None 6/10/2019  2:08 AM   Drainage Description [facility] 6/9/2019  3:33 PM   Incision Care Other (Comment) 6/8/2019 11:49 PM   Dressing Intervention Clean, dry, intact 6/10/2019  2:08 AM   Number of days: 8     Line/device assessment(s) completed for medical necessity    Constitutional -somewhat confused but pleasant, alert  HEENT - atraumatic, normocephalic, NG tube and NC removed  Neck - supple, no masses, no JVD  CVS - S1 S2 RRR, no murmurs, rubs, gallops  Respiratory - CTA b/l  GI - soft, NT, ND, incision c/d/i, no  organomegaly  Musculoskeletal - no LE edema, no lesions  Neuro - oriented x 2 (did not know the date), no gross focal deficits    Medications       aspirin  81 mg Oral Daily     atorvastatin  10 mg Oral QPM     enoxaparin  40 mg Subcutaneous Q24H     ezetimibe  10 mg Oral At Bedtime     ferrous sulfate  325 mg Oral Daily with supper     levothyroxine  125 mcg Oral QAM AC     lisinopril  40 mg Oral Daily     QUEtiapine  25 mg Oral Daily     QUEtiapine  50 mg Oral QPM     tamsulosin  0.4 mg Oral QPM       Data   Recent Labs   Lab 06/08/19 0521 06/07/19 0520 06/06/19  0526 06/05/19 0520   WBC  --  14.1* 11.7* 9.5   HGB  --  12.1* 10.6* 10.5*   MCV  --  91 90 92   PLT  --  278 229 179    138 140 140   POTASSIUM 3.3* 3.9 4.1 4.1   CHLORIDE 106 106 108 108   CO2 27 23 25 27   BUN 18 16 17 23   CR 1.03 1.01 0.95 1.01   ANIONGAP 6 9 7 5   ERIKA 7.7* 7.9* 7.6* 7.6*   * 135* 136* 127*     Lactic Acid   Date Value Ref Range Status   06/02/2019 2.0 0.7 - 2.0 mmol/L Final   06/02/2019 1.9 0.7 - 2.0 mmol/L Final   06/01/2019 2.0 0.7 - 2.0 mmol/L Final       No results found for this or any previous visit (from the past 24 hour(s)).    Henna Macedo MD

## 2019-06-10 NOTE — PROGRESS NOTES
[facility] Progress Note         Assessment and Plan:    Assessment:   80 yo male POD #10 s/p exploratory laparotomy from small bowel perforation with fascial dehiscence and POD #8 s/p exploratory laparotomy with small bowel anastomosis and fascial closure      Plan:   Continue to ADAT  VAC change today with wound care  PT/OT  Nursing home placement     Additional problems to be followed by medicine team           Interval History:   The patient did ok overnight, no acute events       Significant Problems:    Active Problems:    Sepsis (H)    Chronic atrial fibrillation (H)    Malignant neoplasm of transverse colon (H)    Small bowel perforation (H)    Cardiac pacemaker in situ            Review of Systems:    The patient denies any chest pain, shortness of breath, excessive pain, fever, chills, purulent drainage from the wound, nausea or vomiting.         Medications:     All medications related to the patient's surgery have been reviewed  Current Facility-Administered Medications   Medication     aspirin (ASA) chewable tablet 81 mg     atorvastatin (LIPITOR) tablet 10 mg     glucose gel 15-30 g    Or     dextrose 50 % injection 25-50 mL    Or     glucagon injection 1 mg     diphenhydrAMINE (BENADRYL) solution 12.5 mg     enoxaparin (LOVENOX) injection 40 mg     ezetimibe (ZETIA) tablet 10 mg     fentaNYL (PF) (SUBLIMAZE) injection 25-50 mcg     ferrous sulfate (FEROSUL) tablet 325 mg     haloperidol lactate (HALDOL) injection 2 mg     HYDROmorphone (PF) (DILAUDID) injection 0.2 mg     ipratropium - albuterol 0.5 mg/2.5 mg/3 mL (DUONEB) neb solution 3 mL     levothyroxine (SYNTHROID/LEVOTHROID) tablet 125 mcg     lidocaine (LMX4) kit     lidocaine 1 % 0.1-1 mL     lisinopril (PRINIVIL/Zestril) tablet 40 mg     nalbuphine (NUBAIN) injection 2.5-5 mg     naloxone (NARCAN) injection 0.1-0.4 mg     naloxone (NARCAN) injection 0.1-0.4 mg     ondansetron (ZOFRAN-ODT) ODT tab 4 mg    Or      ondansetron (ZOFRAN) injection 4 mg     QUEtiapine (SEROquel) tablet 25 mg     QUEtiapine (SEROquel) tablet 50 mg     sodium chloride (PF) 0.9% PF flush 10-20 mL     sodium chloride (PF) 0.9% PF flush 10-20 mL     tamsulosin (FLOMAX) capsule 0.4 mg             Physical Exam:     Vitals were reviewed  Patient Vitals for the past 8 hrs:   BP Temp Temp src Heart Rate Resp SpO2 Weight   06/10/19 0800 108/50 98  F (36.7  C) Temporal 59 18 (!) 90 % --   06/10/19 0719 -- -- -- -- -- 92 % --   06/10/19 0515 (!) 138/48 -- -- 61 18 (!) 91 % --   06/10/19 0512 -- -- -- -- -- -- 101.4 kg (223 lb 8.7 oz)   06/10/19 0207 151/53 98.8  F (37.1  C) Tympanic 61 16 93 % --       General:  Awake, alert, responds to questions appropriately  Abdomen: wound vac intact with no air leak, JPs serosangenous  Extremities:  Pitting edema to bilateral lower extremities    # Pain Assessment:  Current Pain Score 6/10/2019   Patient currently in pain? denies   Pain score (0-10) -   Pain location -   Pain descriptors -   rFLACC pain score -            Data:   All laboratory data related to this surgery reviewed  No results found for this or any previous visit (from the past 24 hour(s)).      Pastor Vilchis MD

## 2019-06-10 NOTE — CONSULTS
Spoke with Dr. Vilchis regarding NPWT dressing change. He explains that the setting is to be at 125mm/Hg continuous low without need for contact layer. He is okay with waiting till tomorrow for dressing change.     Also spoke with Rona, , she informed me that Ag was going to be discharged to a NH. She asked for wound measurements to be taken; will do so tomorrow. Due to the potential for the NH not getting the vac until the day after tomorrow will possibly need to revert to NS moist saline gauze dressings until the facility can obtain a vac.

## 2019-06-10 NOTE — PROGRESS NOTES
Met with Ag, met with son Brian and updated SNF's with updated notes. Waiting on an accepting facility.

## 2019-06-10 NOTE — PROGRESS NOTES
Placed call to Geno Wise and [facility].. Waiting for an update. CM remains available.

## 2019-06-10 NOTE — PLAN OF CARE
Discharge Planner PT   Patient plan for discharge: SNF  Current status: Pt seated in recliner at start of treatment. Transferred sit to stand with CGA. Ambulated about 30ft to bathroom. Completed toilet transfer with CGA using grab bars. Pt able to don/doff brief independently. Pt then ambulated about 200ft with FWW and CGA with 1 standing rest break. Activity tolerance improving. Pt seated in recliner at end of treatment with personal alarm placed.   Barriers to return to prior living situation: Weakness, decreased activity tolerance  Recommendations for discharge: SNF  Rationale for recommendations: Making good progress but SNF still appropriate         Entered by: Cristina Robins 06/10/2019 2:20 PM

## 2019-06-10 NOTE — PLAN OF CARE
"Reason for hospital stay:  Sepsis  Most recent vitals: BP (!) 138/48   Pulse 77   Temp 98.8  F (37.1  C) (Tympanic)   Resp 18   Ht 1.651 m (5' 5\")   Wt 101.4 kg (223 lb 8.7 oz)   SpO2 (!) 91%   BMI 37.20 kg/m    Pain Management:  Denies pain  Orientation:  Alert et confused  Cardiac:  Pacemaker  Respiratory:  Lung sounds clear bilat sats 91% on room air  GI:  Bowel sounds audible et active x4, abdomen soft et non-tender. 10ml serous drainage to JOSE on R et 20ml serous drainage to JOSE on L  :  WDL  Skin Issues:  Wound vac in place  IVF:  No IV access  ABX:  n/a  Mobility:  SBA 1 with gait belt et walker for transfers et independently moves about in bed  Safety:  Alarms audible et active   Comments:      6/10/2019  5:25 AM  Danica Borges  Face to face report given with opportunity to observe patient.    Report given to Shanthi Borges   6/10/2019  6:52 AM      "

## 2019-06-10 NOTE — PLAN OF CARE
Discharge Planner OT   Patient plan for discharge: STR  Current status: CGA for functional mobility. SBA and verbal cues to pull socks up fully. Setup in sitting for grooming tasks.   Barriers to return to prior living situation: living alone, weakness, impaired activity tolerance  Recommendations for discharge: STR  Rationale for recommendations: Pt is making gains in therapy however would still recommend placement as he would not be safe to return home alone to manage ADLs/IADLs independently.        Entered by: Nimisha Zeng 06/10/2019 11:46 AM

## 2019-06-10 NOTE — PROGRESS NOTES
Ag has been accepted at [facility]. Family advise. Waiting on information from wound care/Dr Vilchis. CM remains available.

## 2019-06-11 ENCOUNTER — APPOINTMENT (OUTPATIENT)
Dept: OCCUPATIONAL THERAPY | Facility: HOSPITAL | Age: 79
DRG: 907 | End: 2019-06-11
Payer: MEDICARE

## 2019-06-11 ENCOUNTER — APPOINTMENT (OUTPATIENT)
Dept: PHYSICAL THERAPY | Facility: HOSPITAL | Age: 79
DRG: 907 | End: 2019-06-11
Payer: MEDICARE

## 2019-06-11 ENCOUNTER — APPOINTMENT (OUTPATIENT)
Dept: SPEECH THERAPY | Facility: HOSPITAL | Age: 79
DRG: 907 | End: 2019-06-11
Payer: MEDICARE

## 2019-06-11 VITALS
WEIGHT: 210.32 LBS | RESPIRATION RATE: 18 BRPM | TEMPERATURE: 99.2 F | OXYGEN SATURATION: 92 % | SYSTOLIC BLOOD PRESSURE: 109 MMHG | BODY MASS INDEX: 35.04 KG/M2 | HEART RATE: 77 BPM | HEIGHT: 65 IN | DIASTOLIC BLOOD PRESSURE: 50 MMHG

## 2019-06-11 PROCEDURE — 25000132 ZZH RX MED GY IP 250 OP 250 PS 637: Performed by: SURGERY

## 2019-06-11 PROCEDURE — 99239 HOSP IP/OBS DSCHRG MGMT >30: CPT | Performed by: INTERNAL MEDICINE

## 2019-06-11 PROCEDURE — 92526 ORAL FUNCTION THERAPY: CPT | Mod: GN

## 2019-06-11 PROCEDURE — 97530 THERAPEUTIC ACTIVITIES: CPT | Mod: GP | Performed by: PHYSICAL THERAPIST

## 2019-06-11 PROCEDURE — A9270 NON-COVERED ITEM OR SERVICE: HCPCS | Performed by: SURGERY

## 2019-06-11 PROCEDURE — A9270 NON-COVERED ITEM OR SERVICE: HCPCS | Performed by: INTERNAL MEDICINE

## 2019-06-11 PROCEDURE — 25000132 ZZH RX MED GY IP 250 OP 250 PS 637: Performed by: INTERNAL MEDICINE

## 2019-06-11 PROCEDURE — 40000275 ZZH STATISTIC RCP TIME EA 10 MIN

## 2019-06-11 PROCEDURE — 97530 THERAPEUTIC ACTIVITIES: CPT | Mod: GO

## 2019-06-11 RX ORDER — OXYCODONE HYDROCHLORIDE 5 MG/1
2.5 TABLET ORAL EVERY 4 HOURS PRN
Qty: 30 TABLET | Refills: 0 | Status: SHIPPED | OUTPATIENT
Start: 2019-06-11 | End: 2019-07-10

## 2019-06-11 RX ORDER — QUETIAPINE FUMARATE 50 MG/1
50 TABLET, FILM COATED ORAL EVERY EVENING
DISCHARGE
Start: 2019-06-11 | End: 2019-07-10

## 2019-06-11 RX ORDER — FENTANYL CITRATE 50 UG/ML
25 INJECTION, SOLUTION INTRAMUSCULAR; INTRAVENOUS
Status: DISCONTINUED | OUTPATIENT
Start: 2019-06-11 | End: 2019-06-11 | Stop reason: HOSPADM

## 2019-06-11 RX ORDER — QUETIAPINE FUMARATE 25 MG/1
25 TABLET, FILM COATED ORAL DAILY
DISCHARGE
Start: 2019-06-12 | End: 2019-07-10

## 2019-06-11 RX ADMIN — ASPIRIN 81 MG 81 MG: 81 TABLET ORAL at 09:12

## 2019-06-11 RX ADMIN — LISINOPRIL 40 MG: 40 TABLET ORAL at 09:11

## 2019-06-11 RX ADMIN — LEVOTHYROXINE SODIUM 125 MCG: 100 TABLET ORAL at 06:34

## 2019-06-11 RX ADMIN — QUETIAPINE FUMARATE 25 MG: 25 TABLET ORAL at 09:11

## 2019-06-11 RX ADMIN — OXYCODONE HYDROCHLORIDE 2.5 MG: 5 TABLET ORAL at 09:12

## 2019-06-11 ASSESSMENT — ACTIVITIES OF DAILY LIVING (ADL)
ADLS_ACUITY_SCORE: 16

## 2019-06-11 ASSESSMENT — MIFFLIN-ST. JEOR: SCORE: 1585.88

## 2019-06-11 NOTE — PROGRESS NOTES
Name: Ag Perez    MRN#: [id number]    Reason for Hospitalization: SBO (small bowel obstruction) (H) [K56.609]  Small bowel perforation (H) [K63.1]    Discharge Date: 6/11/2019    Patient / Family response to discharge plan: in agreement    Follow-Up Appt: No future appointments.    Other Providers (Care Coordinator, County Services, PCA services etc): Yes: [facility]    Discharge Disposition: short-term care facility for wound care and STR    Rona Zuñiga

## 2019-06-11 NOTE — PLAN OF CARE
Discharge Planner OT   Patient plan for discharge: have not discussed with pt, but the plan is for STR  Current status: CGA for bed and commode sit<>stand transfers and ambulation in room using FWW; Dane for LB dressing  Barriers to return to prior living situation: Impaired activity tolerance, generalized weakness, living alone  Recommendations for discharge: STR  Rationale for recommendations: Pt continues to require assistance for ADLs and functional mobility and would not be safe to return home alone.        Entered by: Nimisha Zeng 06/11/2019 11:43 AM

## 2019-06-11 NOTE — PLAN OF CARE
Pt alert but disoriented to time and place. He denies pain. Afebrile. HR 70s and regular. /61. RR 18 and sating 91-92% on room air. Lung sounds are diminished but clear. Bowel sounds are active in the upper quadrants and hypoactive in the lower quadrants. Wound vac in place and set to suction. Dressing intact. 2+ edema in bilateral lower extremities. Up with standby assist. Alarms on, call light within reach and pt calls appropriately.     Face to face report given with opportunity to observe patient.    Report given to PARISA Valentine   6/11/2019  7:38 AM

## 2019-06-11 NOTE — PLAN OF CARE
Patient discharged at 2:20 PM via wheel chair accompanied by son and staff. Prescriptions sent with patient to fill . All belongings sent with patient.     Discharge instructions reviewed with Henny MCCAULEY at [facility]. Listed belongings gathered and returned to patient. yes    Patient discharged to [facility].   Report called to Nursing Home:  [facility]    Core Measures and Vaccines  Core Measures applicable during stay: Yes. If yes, state diagnosis: Open abdomenal surgery (SCIP)  Pneumonia and Influenza given prior to discharge, if indicated: N/A    Surgical Patient   Surgical Procedures during stay: Yes  Did patient receive discharge instruction on wound care and recognition of infection symptoms? N/A - reviewed with nurse at [facility]  Follow up appointment made:  No will be set through facility  Home and hospital aquired medications returned to patient: N/A  Patient reports pain was well managed at discharge: Yes

## 2019-06-11 NOTE — PROGRESS NOTES
Attempted to reach family to let them know that Dr Alvarez had accepted at [facility].    PAS-RR    Per DHS regulation, CTS team completed and submitted PAS-RR to [place] [employer] via the [employer]. CTS team advised SNF and they are aware a PAS-RR has been submitted.     CTS team reviewed with pt or health care agent that they may be contacted for a follow up appointment within 10 days of hospital discharge if SNF stay is <30 days. Contact information for [employer] was also provided.     Pt or health care agent verbalized understanding.     PAS-RR # [id number]

## 2019-06-11 NOTE — PLAN OF CARE
Discharge Planner SLP   Patient plan for discharge: Patient did not state  Current status: regular diet with thin liquids  Barriers to return to prior living situation: none known for swallowing  Recommendations for discharge: aspiration precautions; cues to slow down rate of presentation of liquids (I.e., small sips). Alternate between small bites and small sips  Rationale for recommendations: Patient is independent and safe with diet       Entered by: Treva Byrd 06/11/2019 12:15 PM

## 2019-06-11 NOTE — PLAN OF CARE
Discharge Planner PT   Patient plan for discharge: rehab  Current status: sit>stand CGA, ambulated 250' with FWW CGA, good pace with head turns L<>R without LOB, O2 sats 94-95% after activity  Barriers to return to prior living situation: risk for falls, cognition but improving, lives alone  Recommendations for discharge: short term rehab  Rationale for recommendations: Pt would benefit from ongoing PT in short term rehab to continue to progress mobility and assess cognition to allow for safe return to home.       Entered by: Lona Noble, PT 06/11/2019 12:35 PM

## 2019-06-11 NOTE — PLAN OF CARE
"Reason for hospital stay:  Abdominal wound vac    Most recent vitals: BP (!) 146/45 (BP Location: Right arm)   Pulse 77   Temp 99.4  F (37.4  C) (Tympanic)   Resp 18   Ht 1.651 m (5' 5\")   Wt 101.4 kg (223 lb 8.7 oz)   SpO2 94%   BMI 37.20 kg/m      Pain Management:  Denied any pain    Orientation:  Oriented to self and situation. Was confused about town and date. Patient thought it was Friday and that he was in [facility]. Easily redirected.     Cardiac:  WDL    Respiratory:  Lungs clear - sats 94% on RA.    GI:  Bowel sounds hypoactive x4 quadrants. Good appetite.     :  WDL    Skin Issues:  Wound vac dressing intact to abdomen. Dressing reinforced. Approx 25ml serosanguinous drainage in wound vac. Dr. Vilchis in and removed JOSE drains.     Mobility:  Up with standby assist.     Safety:  Alarms on        Face to face report given with opportunity to observe patient.    Report given to Julienne Dodd   6/10/2019  11:30 PM  "

## 2019-06-11 NOTE — CONSULTS
"Ag was seen per request of Dr. Vilchis for abdominal wound care.  The NH facility will not be able to get a wound vac in house until possibly tomorrow so will have to initiate BID NS moist gauze dressings until then.  He was premedicated before the procedure and pain was controlled.  Removed vac foam from wound bed (1 piece). Cleansed base of wound with NS and periwound skin with soap and water.    Midline abdominal wound measures 21.0x6.5x5.0cm. Undermines at 6:00 3.1cm and from 9-12:00 2.4cm. Centermost base of wound has an area of tissue with a yellow tinge. The outside edges of the wound has a few scattered areas of black devitalized tissue. Drainage in canister is serosanguinous with a tinge of green, no purulence. Faint odor before cleansing.    Dressed wound with NS moist kerlix wrung as dry as able (used one whole 4\" kerlix to base), covered with dry gauze then ABD pads, secured with Medipore tape.     Spoke with Dr. Vilchis about plan of care:  BID wet to dry dressing changes until vac can be placed at the facility.  Vac dressing change M,W,F (setting 125mm/Hg cont low)         "

## 2019-06-11 NOTE — DISCHARGE SUMMARY
[facility]    Discharge Summary  Hospitalist    Date of Admission:  5/30/2019  Date of Discharge:  6/11/2019  2:20 PM  Discharging Provider: Isabel Sparks  Date of Service (when I saw the patient): 6/11/19    Discharge Diagnoses     Small bowel perforation  Acute metabolic encephalopathy  Metabolic and respiratory acidosis  Severe Sepsis with septic shock  Acute kidney injury  Chronic atrial fibrillation  Malignant neoplasm of transverse colon  Cardiac pacemaker in situ    History of Present Illness   Ag Perez is a 79 year old male with history of hypertension, atrial fibrillation on anticoagulation who underwent left transverse hemicolectomy 03/12/2019 demonstrating invasive moderate to focally poorly differentiated colonic adenocarcinoma, refused to get chemotherapy presented to the ED today with acute onset of  abdominal pain.  He was going to the bathroom when he felt like a pop and developed abdominal pain.  It was a colicky pain 6-8 over 10 intensity.  No fevers or chills.  Did not have any nausea or vomiting.  His last bowel movement was yesterday and it was normal.  He is not passing gas.  At the time of history taking his pain has improved.  He denies nausea or vomiting.  No chest pain or shortness of breath.    Hospital Course   Ag Perez was admitted on 5/30/2019.  The following problems were addressed during his hospitalization:    Small bowel perforation:   Presented with abdominal pain. 5/31/19 morning demonstrated acute abdomen on assessment. Dr. Vilchis was notified and he took him to OR urgently.  Remained on ventilator postoperatively with abdominal wound open and suction dressing with moderate amounts serosanguinous output. Postoperative day 2 he went to OR for second look, had wash out and facial closure. He was extubated post op, taken off levophed and was doing extremely well. NG was removed postop day 5 and wound vac placed. He started having some return bowel function,  but increased agitated delirium. Pain was minimal. No nausea. Agitation persisted and he was placed on schedule seroquel with significant improvement. On discussion with his PCP at discharge, he does have some baseline dementia/cognitive impairment. At this point, he is much more cooperative with cares. He has been up to ambulate with PT. He is eating well. Pain has been well controlled. He will have wound vac placed at the SNF and will follow up with Dr. Vilchis in two weeks.      Acute metabolic encephalopathy:  Demonstrated intermittent agitated delirium postoperatively. He does have some baseline dementia putting him more at risk. Prompted by small bowel perforation and development of severe sepsis with shock. Did receive IV haldol and eventually placed on scheduled seroquel. Has markedly improved now and is cooperative with cares. I think that as he recovers and progresses, he could be taken off the seroquel. Perhaps eliminate the AM dose and then work to wean PM dose. Have discussed this with his PCP.      Metabolic and respiratory acidosis:   Resolved.  Secondary to severe sepsis     Severe Sepsis with septic shock:   Resolved. Due to acute peritonitis from small bowel perforation. Toxic appearing initially. Given 3 liter fluid bolus, started on IV Zosyn. Lactic acid was 4. To OR urgently for source control 5/31. In pre-op, patient developed hypoxia, worsened tachypnea, hypotension. He was started on neosynephrine gtt in the OR.  Levophed weaned off on post-op day 2. Afebrile, normal WBC. Lactic acid normalized 6/2 as well. Sepsis now resolved and has been hemodynamically stable through remaining course of his stay. He has had some BP readings on the softer side still, with SBP in low 100 range. He has not received his hydrochlorothiazide while here. Will continue to hold that for now, however, if BP is creeping up or consistently >140, would consider resuming his hydrochlorothiazide.      Acute kidney  injury:   Resolved.  Due to severe sepsis with end organ damage. Urine output decent through case but started dropping off post-operatively. Creatinine bumped from 1.08 to 1.29 then 1.59. Renal function back to normal 6/1.      Chronic atrial fibrillation:   Has a pacemaker, 100% paced on telemetry monitor, has been on Xarelto in the past but was taken off of it due to GI bleeding.      Malignant neoplasm of transverse colon:   S/P resection 2 months ago at [facility]. He did not have any immediate complications during that hospital stay. He is following-up with oncology.      Cardiac pacemaker in situ    Pending Results     Unresulted Labs Ordered in the Past 30 Days of this Admission     Date and Time Order Name Status Description    6/1/2019 0446 Estimated Average Glucose In process     5/31/2019 0742 T4 free In process           Code Status   DNR       Primary Care Physician   Ary Otto-Kiki    Physical Exam   Temp: 99.2  F (37.3  C) Temp src: Tympanic BP: 109/50   Heart Rate: 78 Resp: 18 SpO2: 92 % O2 Device: None (Room air)    Vitals:    06/09/19 0428 06/10/19 0512 06/11/19 0500   Weight: 102.6 kg (226 lb 3.1 oz) 101.4 kg (223 lb 8.7 oz) 95.4 kg (210 lb 5.1 oz)     Vital Signs with Ranges  Temp:  [98.6  F (37  C)-99.4  F (37.4  C)] 99.2  F (37.3  C)  Heart Rate:  [56-78] 78  Resp:  [18] 18  BP: (109-146)/(45-61) 109/50  SpO2:  [91 %-94 %] 92 %  I/O last 3 completed shifts:  In: 1080 [P.O.:1080]  Out: 1775 [Urine:1700; Drains:75]    Constitutional: Alert and oriented X 3. No distress   Respiratory: CTA bilaterally. No wheezes or ronchi.    Cardiovascular: RRR. No murmurs, rubs, gallops. Normal S1/S2   GI: Soft, NTND, no organomegaly. Bowel sounds present   Integument: Warm, dry   Extremities:  No edema     Discharge Disposition   Discharged to nursing home  Condition at discharge: Stable    Consultations This Hospital Stay   PHYSICAL THERAPY ADULT IP CONSULT  OCCUPATIONAL THERAPY ADULT IP  CONSULT  SWALLOW EVAL SPEECH PATH AT BEDSIDE IP CONSULT  WOUND OSTOMY CONTINENCE NURSE  IP CONSULT  PHYSICAL THERAPY ADULT IP CONSULT  OCCUPATIONAL THERAPY ADULT IP CONSULT    Time Spent on this Encounter   I, Isabel Sparks, personally saw the patient today and spent greater than 30 minutes discharging this patient.    Discharge Orders      General info for SNF    Length of Stay Estimate: Short Term Care: Estimated # of Days <30  Condition at Discharge: Improving  Level of care:skilled   Rehabilitation Potential: Good  Admission H&P remains valid and up-to-date: Yes  Recent Chemotherapy: N/A  Use Nursing Home Standing Orders: Yes     Mantoux instructions    Give two-step Mantoux (PPD) Per Facility Policy Yes     Wound care (specify)    Site:  Abdomen  Instructions:  Wet to dry dressing changes of abdominal wound BID until wound vac placed. Once wound vac in place, should be placed on low continuous suction at 125 mmHg and wound vac should be changed three times weekly     Follow Up and recommended labs and tests    Follow up with primary care provider in 10-14 days.  No follow up labs or test are needed. Consider decreasing/discontinuing Seroquel as able  Follow up with Dr. Vilchis in 2 weeks.  No follow up labs or test are needed.  Follow up with oncology per previous arrangements prior to admission     Activity - Up with nursing assistance     Activity - Up with assistive device     Additional Discharge Instructions    Check BP at least every shift. If SBP trending >140, contact MD to consider adding back patient's hydrochlorothiazide. He was on this (25mg) prior to hospital admission, but was held during admission. Has not been restarted due to his pressures being on the softer side still.     DNR (Do Not Resuscitate)     Physical Therapy Adult Consult    Evaluate and treat as clinically indicated.    Reason:  Generalized weakness     Occupational Therapy Adult Consult    Evaluate and treat as clinically  indicated.    Reason:  Generalized weakness     Fall precautions     Advance Diet as Tolerated    Follow this diet upon discharge: Orders Placed This Encounter      Regular Diet Adult     Discharge Medications   Current Discharge Medication List      START taking these medications    Details   oxyCODONE (ROXICODONE) 5 MG tablet Take 0.5 tablets (2.5 mg) by mouth every 4 hours as needed for moderate to severe pain  Qty: 30 tablet, Refills: 0    Associated Diagnoses: Small bowel perforation (H)      !! QUEtiapine (SEROQUEL) 25 MG tablet Take 1 tablet (25 mg) by mouth daily    Associated Diagnoses: Acute metabolic encephalopathy      !! QUEtiapine (SEROQUEL) 50 MG tablet Take 1 tablet (50 mg) by mouth every evening    Associated Diagnoses: Acute metabolic encephalopathy       !! - Potential duplicate medications found. Please discuss with provider.      CONTINUE these medications which have NOT CHANGED    Details   ASPIRIN PO Take 81 mg by mouth daily      atorvastatin (LIPITOR) 10 MG tablet Take 10 mg by mouth every evening      BENAZEPRIL HCL PO Take 40 mg by mouth daily      Ezetimibe (ZETIA PO) Take 10 mg by mouth At Bedtime      ferrous sulfate (FEROSUL) 325 (65 Fe) MG tablet Take 325 mg by mouth daily (with dinner)      LEVOTHYROXINE SODIUM PO Take 125 mcg by mouth daily       Multiple Vitamins-Minerals (THERAPEUTIC M) TABS Take 1 tablet by mouth daily      Omega-3 Fatty Acids (OMEGA-3 FISH OIL PO) Take 1 g by mouth 2 times daily (with meals)       tamsulosin (FLOMAX) 0.4 MG capsule Take 0.4 mg by mouth every evening      Nitroglycerin (NITROSTAT SL) Place 0.4 mg under the tongue every 5 minutes as needed for chest pain         STOP taking these medications       HYDROCHLOROTHIAZIDE PO Comments:   Reason for Stopping:             Allergies   Allergies   Allergen Reactions     Crestor [Rosuvastatin]      Data   Most Recent 3 CBC's:  Recent Labs   Lab Test 06/07/19  0520 06/06/19  0526 06/05/19  0520   WBC 14.1*  11.7* 9.5   HGB 12.1* 10.6* 10.5*   MCV 91 90 92    229 179      Most Recent 3 BMP's:  Recent Labs   Lab Test 06/08/19  0521 06/07/19  0520 06/06/19  0526    138 140   POTASSIUM 3.3* 3.9 4.1   CHLORIDE 106 106 108   CO2 27 23 25   BUN 18 16 17   CR 1.03 1.01 0.95   ANIONGAP 6 9 7   ERIKA 7.7* 7.9* 7.6*   * 135* 136*     Most Recent 2 LFT's:  Recent Labs   Lab Test 06/03/19  0450 06/02/19  0458   AST 16 12   ALT 12 10   ALKPHOS 53 43   BILITOTAL 1.0 1.2     Most Recent INR's and Anticoagulation Dosing History:  Anticoagulation Dose History     Recent Dosing and Labs Latest Ref Rng & Units 11/2/2015 5/30/2019 5/31/2019    INR 0.80 - 1.20 1.10 1.05 1.16        Most Recent 3 Troponin's:  Recent Labs   Lab Test 06/01/19  0108 05/31/19  2055 05/31/19  1759   TROPI 0.066* 0.073* 0.084*     Most Recent Cholesterol Panel:  Recent Labs   Lab Test 06/02/19  0458   TRIG 201*     Most Recent 6 Bacteria Isolates From Any Culture (See EPIC Reports for Culture Details):  Recent Labs   Lab Test 05/31/19  0909 05/31/19  0902 05/31/19  0430   CULT No growth after 6 days No growth after 6 days No MRSA isolated     Most Recent TSH, T4 and A1c Labs:  Recent Labs   Lab Test 06/01/19  0446 05/31/19  0742   TSH  --  0.25*   T4  --  1.25   A1C 6.3*  --      Results for orders placed or performed during the hospital encounter of 05/30/19   CT Abdomen Pelvis w/o Contrast    Narrative    PROCEDURE: CT ABDOMEN PELVIS W/O CONTRAST 5/31/2019 12:22 AM    HISTORY: Abd distension    COMPARISONS: 3/22/2012.    Meds/Dose Given: None.    TECHNIQUE: Axial noncontrast enhanced images with coronal and sagittal  reformatted images.    FINDINGS: Lung bases are relatively clear. Coronary artery  calcification is seen.    No focal abnormality is seen in the liver, spleen, adrenal glands or  pancreas. No renal or ureteral stone is seen. There is no  hydronephrosis.    There is a 1.9 cm low-attenuation lesion in the left kidney.    There is an  anterior abdominal wall hernia with diastases of the  rectus muscle. This contains fluid in the very small amount of gas  along the superior margin. Small bowel loop does protrude into the  posterior aspect of this. There is a transition point with mild  dilatation of small bowel loops proximal to this and more decompressed  small bowel loops distal to this.    No other bowel abnormality is seen. There is no pelvic mass or fluid  collection.    There is no aneurysm of the abdominal aorta but there is  atherosclerotic calcification.    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    No enlarged lymph nodes are seen.         Impression    IMPRESSION:   1. Anterior abdominal wall hernia with surrounding fluid. Small bowel  protrudes into this and there is a transition point associated with  this consistent with obstruction.  2. Inflammatory changes in the anterior abdominal wall fat adjacent to  this collection with a few bubbles of subcutaneous gas as well.    Findings are concordant with the original virtual radiology result.    CALE MCKEON MD   XR Chest 1 View    Narrative    PROCEDURE:  XR CHEST 1 VW    HISTORY:  pre-op.  Due to increasing dyspnea.     COMPARISON:  None.    FINDINGS:   The cardiac silhouette is normal in size. The pulmonary vasculature is  normal.  There is a poor inspiratory result with increased density  both lung bases most likely atelectasis. Is a transvenous pacemaker in  place. No pleural effusion or pneumothorax.      Impression    IMPRESSION:  Poor inspiratory result with bibasilar atelectatic  changes      EMMANUEL BILLY MD   XR Chest Port 1 View    Narrative    PROCEDURE:  XR CHEST PORT 1 VW    HISTORY:  ET placement AND central line placement.     COMPARISON:  None.    FINDINGS:   The cardiac silhouette is normal in size. There is a transvenous  pacemaker in place. The pulmonary vasculature is normal.  There is  an  tracheal tube is tip approximately 3.5 cm above the kassandra. There is a  central catheter with its tip in the right atrium. No pleural effusion  or pneumothorax.      Impression    IMPRESSION:  Endotracheal tube with its tip approximately 3.5 cm above  the kassandra      EMMANUEL BILLY MD   XR Chest Port 1 View    Narrative    PROCEDURE:  XR CHEST PORT 1 VW    HISTORY:  Re-check central line placement.     COMPARISON:  None.    FINDINGS:   The heart is normal in size. This transvenous pacemaker in place. A  central venous catheter has been withdrawn and its tip now lies in the  superior vena cava. The endotracheal tube lies with its tip  approximately 4 cm above the kassandra.       Impression    IMPRESSION:  Central venous catheter with its tip in the superior vena  cava      EMMANUEL BILLY MD   XR Chest Port 1 View    Narrative    PROCEDURE:  XR CHEST PORT 1 VW    HISTORY:  vent.     COMPARISON:  5/31/2019    FINDINGS:   Endotracheal tube and right-sided central line are in stable position.  The cardiac silhouette is stable in size. The pulmonary vasculature is  normal.  Patchy atelectasis in the lung bases and small pleural  effusions appear similar. Left sided cardiac generator device is  stable.      Impression    IMPRESSION:  Stable appearance of the chest. Support lines and tubes  are in stable position.      AVERY WOOD MD   XR Chest Port 1 View    Narrative    PROCEDURE:  XR CHEST PORT 1 VW    HISTORY:  VENT,NGT,central line, possible aspiration prior to  intubation.     COMPARISON:  6/1/2019    FINDINGS:   Endotracheal tube and right-sided central line are in stable position.  The cardiac silhouette is stable in size. There are increasing  airspace opacities in the left lung base. There is a small left  pleural effusion. Right basal atelectasis is unchanged. Left-sided  cardiac device is stable.      Impression    IMPRESSION:    1. Worsening left basilar atelectasis or infiltrate and small  left  pleural effusion.  2. Stable support lines and tubes.      AVERY WOOD MD   XR Abdomen 1 View    Narrative    PROCEDURE:  XR ABDOMEN 1 VW    HISTORY:  exploratory laparotomy.    TECHNIQUE:  AP and supine radiographs of the abdomen.    COMPARISON:  5/30/2019    FINDINGS:     The diaphragms are excluded from the field-of-view.    2 surgical drains project over the right abdomen. An enteric tube is  partially visualized. A probable Meeks catheter is seen.    No dilated loops of small bowel or air-fluid levels are seen.      Impression    IMPRESSION:    Multiple drains in place.    LIZET ALBARADO MD

## 2019-06-11 NOTE — PROGRESS NOTES
[facility] General Surgery Progress Note         Assessment and Plan:    Assessment:   78 yo male POD #11 s/p exploratory laparotomy from small bowel perforation with fascial dehiscence and POD #9 s/p exploratory laparotomy with small bowel anastomosis and fascial closure      Plan:   Continue to ADAT  VAC change today with wound care to wet to dry dressing for nursing home placement  Nursing home will place wound vac - 125mmHg low continuous suction  PT/OT  Nursing home placement     Additional problems to be followed by medicine team           Interval History:   The patient did ok overnight, no acute events       Significant Problems:    Active Problems:    Sepsis (H)    Chronic atrial fibrillation (H)    Malignant neoplasm of transverse colon (H)    Small bowel perforation (H)    Cardiac pacemaker in situ            Review of Systems:    The patient denies any chest pain, shortness of breath, excessive pain, fever, chills, purulent drainage from the wound, nausea or vomiting.         Medications:     All medications related to the patient's surgery have been reviewed  Current Facility-Administered Medications   Medication     aspirin (ASA) chewable tablet 81 mg     atorvastatin (LIPITOR) tablet 10 mg     glucose gel 15-30 g    Or     dextrose 50 % injection 25-50 mL    Or     glucagon injection 1 mg     diphenhydrAMINE (BENADRYL) solution 12.5 mg     enoxaparin (LOVENOX) injection 40 mg     ezetimibe (ZETIA) tablet 10 mg     fentaNYL (PF) (SUBLIMAZE) injection 25 mcg     fentaNYL (PF) (SUBLIMAZE) injection 25-50 mcg     ferrous sulfate (FEROSUL) tablet 325 mg     haloperidol lactate (HALDOL) injection 2 mg     HYDROmorphone (PF) (DILAUDID) injection 0.2 mg     ipratropium - albuterol 0.5 mg/2.5 mg/3 mL (DUONEB) neb solution 3 mL     levothyroxine (SYNTHROID/LEVOTHROID) tablet 125 mcg     lidocaine (LMX4) kit     lidocaine 1 % 0.1-1 mL     lisinopril (PRINIVIL/Zestril) tablet 40 mg     nalbuphine  (NUBAIN) injection 2.5-5 mg     naloxone (NARCAN) injection 0.1-0.4 mg     naloxone (NARCAN) injection 0.1-0.4 mg     ondansetron (ZOFRAN-ODT) ODT tab 4 mg    Or     ondansetron (ZOFRAN) injection 4 mg     oxyCODONE IR (ROXICODONE) half-tab 2.5 mg     QUEtiapine (SEROquel) tablet 25 mg     QUEtiapine (SEROquel) tablet 50 mg     sodium chloride (PF) 0.9% PF flush 10-20 mL     sodium chloride (PF) 0.9% PF flush 10-20 mL     tamsulosin (FLOMAX) capsule 0.4 mg             Physical Exam:     Vitals were reviewed  Patient Vitals for the past 8 hrs:   BP Temp Temp src Heart Rate Resp SpO2 Weight   06/11/19 0909 109/50 99.2  F (37.3  C) Tympanic 78 18 94 % --   06/11/19 0712 -- -- -- -- -- 92 % --   06/11/19 0500 -- -- -- -- -- -- 95.4 kg (210 lb 5.1 oz)       General:  Awake, alert, responds to questions appropriately  Abdomen: wound vac intact with no air leak      # Pain Assessment:  Current Pain Score 6/11/2019   Patient currently in pain? yes   Pain score (0-10) 4   Pain location -   Pain descriptors -   rFLACC pain score -            Data:   All laboratory data related to this surgery reviewed  No results found for this or any previous visit (from the past 24 hour(s)).      Pastor Vilchis MD

## 2019-06-11 NOTE — DISCHARGE INSTRUCTIONS
"Ag was seen per request of Dr. Vilchis for abdominal wound care.    The NH facility will not be able to get a wound vac in house until possibly tomorrow so will have to initiate BID NS moist gauze dressings until then.  He was premedicated before the procedure and pain was controlled.  Removed vac foam from wound bed (1 piece). Cleansed base of wound with NS and periwound skin with soap and water.    Midline abdominal wound measures 21.0x6.5x5.0cm, centermost base of wound has an area of tissue with a yellow tinge. The outside edges of the wound has a few scattered areas of black devitalized tissue. Drainage in canister is serosanguinous with a tinge of green, no purulence. Faint odor before cleansing.    Dressed wound with NS moist kerlix wrung as dry as able (used one whole 4\" kerlix to base), covered with dry gauze then ABD pads, secured with Medipore tape.     Spoke with Dr. Vilchis about plan of care:  BID wet to dry dressing changes until vac can be placed at the facility.  Vac dressing change M,W,F (setting 125mm/Hg cont low)           "

## 2019-06-27 ENCOUNTER — TELEPHONE (OUTPATIENT)
Dept: WOUND CARE | Facility: OTHER | Age: 79
End: 2019-06-27

## 2019-06-27 ENCOUNTER — OFFICE VISIT (OUTPATIENT)
Dept: SURGERY | Facility: OTHER | Age: 79
End: 2019-06-27
Attending: SURGERY
Payer: MEDICARE

## 2019-06-27 VITALS
DIASTOLIC BLOOD PRESSURE: 58 MMHG | OXYGEN SATURATION: 95 % | HEART RATE: 72 BPM | SYSTOLIC BLOOD PRESSURE: 112 MMHG | BODY MASS INDEX: 37.1 KG/M2 | WEIGHT: 222.66 LBS | HEIGHT: 65 IN | TEMPERATURE: 100.2 F

## 2019-06-27 DIAGNOSIS — Z09 POSTOP CHECK: Primary | ICD-10-CM

## 2019-06-27 PROCEDURE — 99024 POSTOP FOLLOW-UP VISIT: CPT | Performed by: SURGERY

## 2019-06-27 PROCEDURE — G0463 HOSPITAL OUTPT CLINIC VISIT: HCPCS

## 2019-06-27 ASSESSMENT — PAIN SCALES - GENERAL: PAINLEVEL: NO PAIN (0)

## 2019-06-27 ASSESSMENT — MIFFLIN-ST. JEOR: SCORE: 1641.88

## 2019-06-27 NOTE — PATIENT INSTRUCTIONS
Thank you for allowing Dr. Vilchis and our surgical team to participate in your care. Please call our health unit coordinator at [phone] with scheduling questions or the nurse at [phone] with any other questions or concerns.

## 2019-06-27 NOTE — TELEPHONE ENCOUNTER
Patient is currently in [facility] and has a negative pressure wound vac Henny the nurse at [facility] would like a phone call back to discuss if the patient should be coming into wound care once a week. Please call Henny back at [phone]

## 2019-06-27 NOTE — PROGRESS NOTES
"  HPI:  Returns for his post surgical examination following exploratory laparotomy with small bowel resection and anastamosis without complaint.  Denies wound complication, fever, chills, nausea or vomiting. He has been doing well at rehab and is anticipating discharge home tomorrow. His wound VAC has been managed by nursing at the rehab facility. He has no issues since his hospitalization. He says he has been feeling good and has had no problems. He is tolerating a diet with normal bowel function.  ROS:   10 point ROS neg other than the symptoms noted above in the HPI.    Examination:  /58 (BP Location: Left arm, Patient Position: Chair, Cuff Size: Adult Regular)   Pulse 72   Temp 100.2  F (37.9  C) (Tympanic)   Ht 1.651 m (5' 5\")   Wt 101 kg (222 lb 10.6 oz)   SpO2 95%   BMI 37.05 kg/m      Constitutional: healthy, alert and no distress  HEENT:  No obvious masses, lesions,  or abnormalities  Cardiovascular: negative findings: regular rate and rhythm, S1 normal, S2 normal  Respiratory: Good diaphragmatic excursion. Lungs clear to auscultation  Abdomen:  Soft, non-tender, non-distended  Wound(s) Vac intact with no air leak    Impression:   80 yo male s/p exploratory laparotomy for small bowel perforation from fascial dehiscence, with open abdominal wound.  Recommendations:  The technical aspects of the procedure and operative findings were reviewed.  He was again reminding in the need to refrain from heavy lifting and strenuous activity for a total of 6 weeks following this procedure.  Upon discharge from his rehab facility he will need VAC therapy with wound care. I discussed that I will plan on seeing the patient when he is in wound care for a follow up. All questions and concerns were addressed with adequate time spent answering all concerns.       Pastor Vilchis MD    6/27/2019  3:55 PM               "

## 2019-06-27 NOTE — NURSING NOTE
"Chief Complaint   Patient presents with     Surgical Followup     s/p- exploratory laparotomy, small bowel resection-jejunum, small bowel anastomosis, abdominal wash out fascial closure- 6/2/19       Initial /58 (BP Location: Left arm, Patient Position: Chair, Cuff Size: Adult Regular)   Pulse 72   Temp 100.2  F (37.9  C) (Tympanic)   Ht 1.651 m (5' 5\")   Wt 101 kg (222 lb 10.6 oz)   SpO2 95%   BMI 37.05 kg/m   Estimated body mass index is 37.05 kg/m  as calculated from the following:    Height as of this encounter: 1.651 m (5' 5\").    Weight as of this encounter: 101 kg (222 lb 10.6 oz).  Medication Reconciliation: allison LOOMIS      "

## 2019-06-28 ENCOUNTER — TELEPHONE (OUTPATIENT)
Dept: WOUND CARE | Facility: HOSPITAL | Age: 79
End: 2019-06-28

## 2019-06-28 DIAGNOSIS — S31.109D OPEN WOUND OF ABDOMINAL WALL, SUBSEQUENT ENCOUNTER: Primary | ICD-10-CM

## 2019-06-28 NOTE — TELEPHONE ENCOUNTER
Henny, a nurse at the rehab facility Ag is in, called yesterday and explained that Ag would be discharged on Monday with [facility] home care services. She states he was seen by Dr. Vilchis who wrote orders for him to follow up in outpt wound care. Spoke with Dr. Vilchis and received outpt referral orders.

## 2019-07-05 ENCOUNTER — OFFICE VISIT (OUTPATIENT)
Dept: WOUND CARE | Facility: OTHER | Age: 79
End: 2019-07-05
Attending: SURGERY
Payer: COMMERCIAL

## 2019-07-05 VITALS
RESPIRATION RATE: 16 BRPM | HEART RATE: 64 BPM | TEMPERATURE: 98.2 F | DIASTOLIC BLOOD PRESSURE: 54 MMHG | SYSTOLIC BLOOD PRESSURE: 116 MMHG

## 2019-07-05 DIAGNOSIS — S31.109D OPEN WOUND OF ABDOMINAL WALL, SUBSEQUENT ENCOUNTER: ICD-10-CM

## 2019-07-05 LAB
BASOPHILS # BLD AUTO: 0 10E9/L (ref 0–0.2)
BASOPHILS NFR BLD AUTO: 0.2 %
DIFFERENTIAL METHOD BLD: ABNORMAL
EOSINOPHIL # BLD AUTO: 0 10E9/L (ref 0–0.7)
EOSINOPHIL NFR BLD AUTO: 0.2 %
ERYTHROCYTE [DISTWIDTH] IN BLOOD BY AUTOMATED COUNT: 15.4 % (ref 10–15)
HCT VFR BLD AUTO: 27.7 % (ref 40–53)
HGB BLD-MCNC: 9.4 G/DL (ref 13.3–17.7)
IMM GRANULOCYTES # BLD: 0.1 10E9/L (ref 0–0.4)
IMM GRANULOCYTES NFR BLD: 0.6 %
LYMPHOCYTES # BLD AUTO: 1.8 10E9/L (ref 0.8–5.3)
LYMPHOCYTES NFR BLD AUTO: 15 %
MCH RBC QN AUTO: 30.1 PG (ref 26.5–33)
MCHC RBC AUTO-ENTMCNC: 33.9 G/DL (ref 31.5–36.5)
MCV RBC AUTO: 89 FL (ref 78–100)
MONOCYTES # BLD AUTO: 1.1 10E9/L (ref 0–1.3)
MONOCYTES NFR BLD AUTO: 9.5 %
NEUTROPHILS # BLD AUTO: 8.7 10E9/L (ref 1.6–8.3)
NEUTROPHILS NFR BLD AUTO: 74.5 %
NRBC # BLD AUTO: 0 10*3/UL
NRBC BLD AUTO-RTO: 0 /100
PLATELET # BLD AUTO: 321 10E9/L (ref 150–450)
PROCALCITONIN SERPL-MCNC: 0.11 NG/ML
RBC # BLD AUTO: 3.12 10E12/L (ref 4.4–5.9)
WBC # BLD AUTO: 11.7 10E9/L (ref 4–11)

## 2019-07-05 PROCEDURE — 36415 COLL VENOUS BLD VENIPUNCTURE: CPT | Mod: ZL | Performed by: NURSE PRACTITIONER

## 2019-07-05 PROCEDURE — 99214 OFFICE O/P EST MOD 30 MIN: CPT | Performed by: NURSE PRACTITIONER

## 2019-07-05 PROCEDURE — G0463 HOSPITAL OUTPT CLINIC VISIT: HCPCS | Mod: 25

## 2019-07-05 PROCEDURE — 84145 PROCALCITONIN (PCT): CPT | Mod: ZL | Performed by: NURSE PRACTITIONER

## 2019-07-05 PROCEDURE — 85025 COMPLETE CBC W/AUTO DIFF WBC: CPT | Mod: ZL | Performed by: NURSE PRACTITIONER

## 2019-07-05 PROCEDURE — G0463 HOSPITAL OUTPT CLINIC VISIT: HCPCS

## 2019-07-05 ASSESSMENT — PAIN SCALES - GENERAL: PAINLEVEL: NO PAIN (0)

## 2019-07-05 NOTE — NURSING NOTE
"Chief Complaint   Patient presents with     WOUND CARE       Initial /54 (BP Location: Left arm, Patient Position: Sitting, Cuff Size: Adult Regular)   Pulse 64   Temp 98.2  F (36.8  C) (Tympanic)   Resp 16  Estimated body mass index is 37.05 kg/m  as calculated from the following:    Height as of 6/27/19: 1.651 m (5' 5\").    Weight as of 6/27/19: 101 kg (222 lb 10.6 oz).  Medication Reconciliation: complete  "

## 2019-07-05 NOTE — PATIENT INSTRUCTIONS
Thank you for allowing Dr. Vilchis and our surgical team to participate in your care. Please call our health unit coordinator at [phone] with scheduling questions or the nurse at [phone] with any other questions or concerns.

## 2019-07-05 NOTE — NURSING NOTE
Contacted Julienne Naylor at [facility] regarding BID damp to dry saline dressings.  They can only do daily dressings.  She will have him seen at 0800 daily.      Also discussed correct draping for NPWT if the wound vac is resumed.  Reminded her to have staff drape under all black foam and to remove backing from disc landing.  She will educate staff.

## 2019-07-05 NOTE — PROGRESS NOTES
SUBJECTIVE:  Ag Perez, 79 year old, male presents with the following Chief Complaint(s) with HPI to follow:  Chief Complaint   Patient presents with     WOUND CARE        Wound care    HPI:  Ag is here today with his son (Heaven) for the reassessment and treatment of abdominal wound.  Back story:  3/12/19: Ag had a left transverse hemicolectomy at [facility] for invasive moderate to focally poorly differentiated colonic adenocarcinoma refused to get chemotherapy.    5/30/19: presented to the ED with acute onset of abdominal pain.  He was going to the bathroom when he felt like a pop and developed abdominal pain.   Hospitalized from 5/30/19 to 6/11/19 for small bowel perforation, acute metabolic encephalopathy, metabolic and respiratory acidosis, severe sepsis with septic shock, acute kidney injury.  Lactic acid, up to 6. Procalcitonin, up to 37.13.  Concerns with incarcerated incisional hernia  5/31/19: surgery by Dr. Vilchis.  Post-op diagnosis: fascial dehiscense with small bowel perforation; Procedure:   EXPLORATORY LAPAROTOMY WITH RESECTION OF SMALL BOWEL, JEJUNUM, TEMPORARY ABDOMINAL WALL CLOSURE, SEROSAL REPAIR  5/31/19: Central line placement  6/11/19: Transferred [facility]. Oma DOWNEY RN CWOCN consulted the patient and per her note--[facility] facility will not be able to get a wound vac in house until possibly tomorrow so will have to initiate BID NS moist gauze dressings until then.  Ag isn't sure what day when the NPWT was placed  6/27/19: telephone encounter that patient is still at [facility] with a NPWT.  Saw Dr. Vilchis this day, who reports that patient is in the process of being discharge from the facility tomorrow.    6/28/19: telephone call to Wound Care.  Ag to be discharge on Monday with [facility] home care services.    7/5/19: 1st wound care appt with myself   No issues with NPWT   Denies any fevers, chills, and/or malodorous drainage.   Reports  "\"I'm feeling great\"  PMH:  history of hypertension, atrial fibrillation on anticoagulation; fascial dehiscense with small bowel perforation, former smoker; history of prediabetes  Last A1c  Lab Results   Component Value Date    A1C 6.3 06/01/2019           Patient Active Problem List   Diagnosis     Advance care planning     SBO (small bowel obstruction) (H)     Sepsis (H)     Acute abdominal pain     Chronic atrial fibrillation (H)     Malignant neoplasm of transverse colon (H)     Small bowel perforation (H)     Cardiac pacemaker in situ       Past Medical History:   Diagnosis Date     Arrhythmia      Hypertension      Pacemaker        Past Surgical History:   Procedure Laterality Date     CARDIAC SURGERY       COLONOSCOPY       COLONOSCOPY N/A 7/24/2015    Procedure: COLONOSCOPY;  Surgeon: Kameron De Santiago MD;  Location: [facility]     LAPAROTOMY EXPLORATORY N/A 5/31/2019    Procedure: EXPLORATORY LAPAROTOMY WITH RESECTION OF SMALL BOWEL, JEJUNUM, TEMPORARY ABDOMINAL WALL CLOSURE, SEROSAL REPAIR;  Surgeon: Pastor Vilchis MD;  Location: [facility] OR     LAPAROTOMY EXPLORATORY N/A 6/2/2019    Procedure: EXPLORATORY LAPAROTOMY, SMALL BOWEL RESECTION-JEJUNUM, SMALL BOWEL ANASTOMOSIS, ABDOMINAL WASH OUT, FASCIAL CLOSURE;  Surgeon: Pastor Vilchis MD;  Location: [facility] OR       History reviewed. No pertinent family history.    Social History     Tobacco Use     Smoking status: Former Smoker     Smokeless tobacco: Never Used   Substance Use Topics     Alcohol use: Not on file       Current Outpatient Medications   Medication Sig Dispense Refill     ASPIRIN PO Take 81 mg by mouth daily       atorvastatin (LIPITOR) 10 MG tablet Take 10 mg by mouth every evening       BENAZEPRIL HCL PO Take 40 mg by mouth daily       Ezetimibe (ZETIA PO) Take 10 mg by mouth At Bedtime       ferrous sulfate (FEROSUL) 325 (65 Fe) MG tablet Take 325 mg by mouth daily (with dinner)       LEVOTHYROXINE SODIUM PO Take 125 mcg by mouth daily        " Multiple Vitamins-Minerals (THERAPEUTIC M) TABS Take 1 tablet by mouth daily       Nitroglycerin (NITROSTAT SL) Place 0.4 mg under the tongue every 5 minutes as needed for chest pain       Omega-3 Fatty Acids (OMEGA-3 FISH OIL PO) Take 1 g by mouth 2 times daily (with meals)        oxyCODONE (ROXICODONE) 5 MG tablet Take 0.5 tablets (2.5 mg) by mouth every 4 hours as needed for moderate to severe pain 30 tablet 0     QUEtiapine (SEROQUEL) 25 MG tablet Take 1 tablet (25 mg) by mouth daily       QUEtiapine (SEROQUEL) 50 MG tablet Take 1 tablet (50 mg) by mouth every evening       tamsulosin (FLOMAX) 0.4 MG capsule Take 0.4 mg by mouth every evening         Allergies   Allergen Reactions     Crestor [Rosuvastatin]        REVIEW OF SYSTEMS  Skin: as noted above  Eyes: negative  Ears/Nose/Throat: negative  Respiratory: No shortness of breath, dyspnea on exertion, cough, or hemoptysis  Cardiovascular: negative; history of atrial fibrillation  Gastrointestinal: as noted above; negative  Genitourinary: negative  Musculoskeletal: negative  Neurologic: negative  Psychiatric: negative  Hematologic/Lymphatic/Immunologic: negative  Endocrine: positive for prediabetes    OBJECTIVE:  /54 (BP Location: Left arm, Patient Position: Sitting, Cuff Size: Adult Regular)   Pulse 64   Temp 98.2  F (36.8  C) (Tympanic)   Resp 16   Constitutional: alert and no distress  Cardiovascular: irregular rhythm, regular rate. No murmurs, clicks gallops or rub  Respiratory:  Good diaphragmatic excursion. Lungs clear  Gastrointestinal: Abdomen soft, non-tender.   Skin:   Wound description: Type of Wound- post-surgical; Location- abdominal--midline, Drainage amount-moderate/large, Drainage color-creamy bloody tan, Odor- yes; Wound bed-see picture--loose slough tangle in suture at the base, Surrounding skin- macerated and red (foam placed on skin), scattered contact dermatitis with satellite lesions, slightly warm  Measurements: 15 x 4.5 x 5 cm,  undermines 5 to 8 o'clock--deepest area (6 o'clock): 4.4 cm; tunnel noted at 3 o'clock: 3.7 cm  Dressing change:   Removed old NPWT dressing--as noted above, no draping on skin, black foam directly on skin.   Cleansed with wound care spray.    Asked Dr. Rodriges for consult--see his note.    Decision made to hold NPWT.    Normal saline dampen kerlix gently packed into wound base, covered with dry gauze and abd pad.  Secured with medipore tape.   Attempted to educate Heaven (son) on how to gently pack wound--he got queasy during it.  No incident     Psychiatric: mentation appears normal for baseline--mildly confused and affect normal/bright      LABS  Results for orders placed or performed in visit on 07/05/19   CBC with platelets and differential   Result Value Ref [facility]    WBC 11.7 (H) 4.0 - 11.0 10e9/L    RBC Count 3.12 (L) 4.4 - 5.9 10e12/L    Hemoglobin 9.4 (L) 13.3 - 17.7 g/dL    Hematocrit 27.7 (L) 40.0 - 53.0 %    MCV 89 78 - 100 fl    MCH 30.1 26.5 - 33.0 pg    MCHC 33.9 31.5 - 36.5 g/dL    RDW 15.4 (H) 10.0 - 15.0 %    Platelet Count 321 150 - 450 10e9/L    Diff Method Automated Method     % Neutrophils 74.5 %    % Lymphocytes 15.0 %    % Monocytes 9.5 %    % Eosinophils 0.2 %    % Basophils 0.2 %    % Immature Granulocytes 0.6 %    Nucleated RBCs 0 0 /100    Absolute Neutrophil 8.7 (H) 1.6 - 8.3 10e9/L    Absolute Lymphocytes 1.8 0.8 - 5.3 10e9/L    Absolute Monocytes 1.1 0.0 - 1.3 10e9/L    Absolute Eosinophils 0.0 0.0 - 0.7 10e9/L    Absolute Basophils 0.0 0.0 - 0.2 10e9/L    Abs Immature Granulocytes 0.1 0 - 0.4 10e9/L    Absolute Nucleated RBC 0.0    Procalcitonin   Result Value Ref [facility]    Procalcitonin 0.11 ng/ml       ASSESSMENT / PLAN:  (S31.109D) Open wound of abdominal wall, subsequent encounter  Comment: noted above  Plan: CBC with platelets and differential,         Procalcitonin          Noted labs.    Thank you Dr. Rodriges for the consult.   Per Dr. Rodriges's orders:  Hold NPWT.  BID damp to dry with  "normal saline.      Due to family's inability to assist with dressing change,   [facility] to come this weekend daily for dressing change; Ag to come daily to Urgent care for evaluation and treatment (thank you).      Follow up with Dr. Vilchis as scheduled    Reviewed symptoms of infection to return to ED/UC.      Treatment goal;  Moisture balance  Prevent infection    Patient Instructions   Stop NPWT until you see Dr. Vilchis.      Wash hands prior to dressing change.   Clean instruments as appropriate    Wash wound with mild soap and water, dry   Apply saline dampen kerlix, one continuous strand, to wound base (please tuck into undermined areas at 5-8 o'clock)--\"fluff, don't stuff\"  Cover with dry gauze and abd pad  Secure with medipore tape  Change twice daily    Please call if any questions/concerns and/or problems ([phone])    Follow up   As scheduled          Time: 70 minutes  Barrier: see PMH  Willingness to learn: accepting    Marissa ABDI FNP-BC  Disease Management    Cc: Dr. Montanez      "

## 2019-07-05 NOTE — PATIENT INSTRUCTIONS
"Stop NPWT until you see Dr. Vilchis.      Wash hands prior to dressing change.   Clean instruments as appropriate    Wash wound with mild soap and water, dry   Apply saline dampen kerlix, one continuous strand, to wound base (please tuck into undermined areas at 5-8 o'clock)--\"fluff, don't stuff\"  Cover with dry gauze and abd pad  Secure with medipore tape  Change twice daily    Please call if any questions/concerns and/or problems ([phone])    Follow up   As scheduled      "

## 2019-07-06 ENCOUNTER — HOSPITAL ENCOUNTER (EMERGENCY)
Facility: HOSPITAL | Age: 79
Discharge: HOME | End: 2019-07-06
Admitting: PHYSICIAN ASSISTANT
Payer: MEDICARE

## 2019-07-06 VITALS
RESPIRATION RATE: 18 BRPM | OXYGEN SATURATION: 95 % | SYSTOLIC BLOOD PRESSURE: 139 MMHG | DIASTOLIC BLOOD PRESSURE: 57 MMHG | TEMPERATURE: 99.9 F

## 2019-07-06 PROCEDURE — G0463 HOSPITAL OUTPT CLINIC VISIT: HCPCS

## 2019-07-06 NOTE — ED NOTES
Pt wanted it noted that when he was in physical therapy he was doing exercises over his head and says he pulled something in his right shoulder and hasn't been able to use it properly since that time.   Daughter-in-law was concerned about pt's low grade temp. Was told that generally a temp isn't treated until 101 or greater and that we will continue to monitor each day when he comes in to make sure he isn't starting to develop an infection.

## 2019-07-06 NOTE — ED TRIAGE NOTES
Patient arrives for a dressing change to abdominal incision. Told to come at 1600 for dressing change. Follows up with Dr. Vilchis on Wednesday.

## 2019-07-06 NOTE — ED NOTES
Pt presents today with his daughter in law for an ABD wound dressing change, there was 2 fluffs removed from the wound bed that had yellowish green malodorous drainage and then there were 4x4's and ABD pad with paper tape. Wound was irrigated with normal saline and 3 and a small partial fluff moistened with normal saline applied loosly in sterile fashion 4x4's applied on top and then an ABD pad with medi pure tape secured to ABD. The wound was viewed by JORDY Reyes because this was the first treatment this writer would be doing.

## 2019-07-07 ENCOUNTER — HOSPITAL ENCOUNTER (EMERGENCY)
Facility: HOSPITAL | Age: 79
Discharge: HOME | End: 2019-07-07
Admitting: NURSE PRACTITIONER
Payer: MEDICARE

## 2019-07-07 VITALS
RESPIRATION RATE: 16 BRPM | OXYGEN SATURATION: 94 % | TEMPERATURE: 99.7 F | SYSTOLIC BLOOD PRESSURE: 145 MMHG | DIASTOLIC BLOOD PRESSURE: 67 MMHG | HEART RATE: 52 BPM

## 2019-07-07 PROCEDURE — G0463 HOSPITAL OUTPT CLINIC VISIT: HCPCS

## 2019-07-07 NOTE — ED NOTES
Pt presents today with daughter-in-law for ABD dressing change. There was a small amount of yellowish/green drainage to old dressing that was applied by home care nurse this morning and the healthy skin on ABD is starting to get irritated by the adhesive tape so a thin layer of triple antibiotic ointment was applied after cleansing. The wound was cleansed with mild soap and water today as per the order and saline moistened Kerlix fluffed into wound cavity in a sterile fashion. There is noted tunneling at 5 o'clock, as per wound care notes, but there wasn't any noted at 8 o'clock. Sterile 4x4's applied on top and an ABD pad secured to the ABD surface with medi pur tape. Pt's temp is noted to be slightly lower today than it was yesterday. He is still having discomfort to his right shoulder and plans to address it with Dr. Montanez tomorrow.

## 2019-07-08 ENCOUNTER — OFFICE VISIT (OUTPATIENT)
Dept: WOUND CARE | Facility: OTHER | Age: 79
End: 2019-07-08
Attending: CLINICAL NURSE SPECIALIST
Payer: COMMERCIAL

## 2019-07-08 VITALS
SYSTOLIC BLOOD PRESSURE: 136 MMHG | RESPIRATION RATE: 16 BRPM | TEMPERATURE: 97.8 F | DIASTOLIC BLOOD PRESSURE: 58 MMHG | HEART RATE: 92 BPM | OXYGEN SATURATION: 97 %

## 2019-07-08 DIAGNOSIS — S31.109D OPEN WOUND OF ABDOMINAL WALL, SUBSEQUENT ENCOUNTER: Primary | ICD-10-CM

## 2019-07-08 PROCEDURE — G0463 HOSPITAL OUTPT CLINIC VISIT: HCPCS | Mod: 25

## 2019-07-08 PROCEDURE — 99213 OFFICE O/P EST LOW 20 MIN: CPT | Performed by: CLINICAL NURSE SPECIALIST

## 2019-07-08 PROCEDURE — G0463 HOSPITAL OUTPT CLINIC VISIT: HCPCS

## 2019-07-08 ASSESSMENT — PAIN SCALES - GENERAL: PAINLEVEL: NO PAIN (0)

## 2019-07-08 NOTE — NURSING NOTE
"Chief Complaint   Patient presents with     WOUND CARE       Initial /58   Pulse 92   Temp 97.8  F (36.6  C)   Resp 16   SpO2 97%  Estimated body mass index is 37.05 kg/m  as calculated from the following:    Height as of 6/27/19: 1.651 m (5' 5\").    Weight as of 6/27/19: 101 kg (222 lb 10.6 oz).  Medication Reconciliation: complete  "

## 2019-07-09 ENCOUNTER — OFFICE VISIT (OUTPATIENT)
Dept: WOUND CARE | Facility: OTHER | Age: 79
End: 2019-07-09
Attending: CLINICAL NURSE SPECIALIST
Payer: COMMERCIAL

## 2019-07-09 VITALS
RESPIRATION RATE: 16 BRPM | TEMPERATURE: 97.8 F | DIASTOLIC BLOOD PRESSURE: 75 MMHG | HEART RATE: 60 BPM | SYSTOLIC BLOOD PRESSURE: 155 MMHG

## 2019-07-09 DIAGNOSIS — S31.109D OPEN WOUND OF ABDOMINAL WALL, SUBSEQUENT ENCOUNTER: Primary | ICD-10-CM

## 2019-07-09 PROCEDURE — 99213 OFFICE O/P EST LOW 20 MIN: CPT | Performed by: CLINICAL NURSE SPECIALIST

## 2019-07-09 PROCEDURE — G0463 HOSPITAL OUTPT CLINIC VISIT: HCPCS

## 2019-07-09 PROCEDURE — G0463 HOSPITAL OUTPT CLINIC VISIT: HCPCS | Mod: 25

## 2019-07-09 ASSESSMENT — PAIN SCALES - GENERAL: PAINLEVEL: MILD PAIN (2)

## 2019-07-09 NOTE — PROGRESS NOTES
SUBJECTIVE:  Ag Perez, 79 year old, male presents with the following Chief Complaint(s) with HPI to follow:   Chief Complaint   Patient presents with     WOUND CARE          HPI:  Ag is here for the re-assessment and treatment of an open abdominal wound.  He presented yesterday from Urgent Care for a dressing change and did not have a plan for today's dressing change, so I had him come back until he sees Dr. Vilchis and they have a plan.    At the visit on 7/5/19 it was noted there were some issues with the wound vac related to the skin being macerated and red with scattered dermatitis lesions because the black foam had been placed directly on the skin.  Dr. Rodriges was consulted and recommended packing the wound with saline dampened kerlix, covered with dry gauze and an ABD pad; changing BID.  Unfortunately, his family is unable to help him with the dressing changes therefore home care will do one dressing change in the morning and he has to come to [facility] for the second dressing change of the day.  He is scheduled to see Dr. Vilchis on 7/10/19 and we will ask the surgery nurses to perform the dressing change in the afternoons going forward.      Back story per Marissa Garg NP 7/5/19 note:  3/12/19: Ag had a left transverse hemicolectomy at [facility] for invasive moderate to focally poorly differentiated colonic adenocarcinoma refused to get chemotherapy.    5/30/19: presented to the ED with acute onset of abdominal pain.  He was going to the bathroom when he felt like a pop and developed abdominal pain.   Hospitalized from 5/30/19 to 6/11/19 for small bowel perforation, acute metabolic encephalopathy, metabolic and respiratory acidosis, severe sepsis with septic shock, acute kidney injury.  Lactic acid, up to 6. Procalcitonin, up to 37.13.  Concerns with incarcerated incisional hernia  5/31/19: surgery by Dr. Vilchis.  Post-op diagnosis: fascial dehiscense with small bowel perforation;  Procedure:   EXPLORATORY LAPAROTOMY WITH RESECTION OF SMALL BOWEL, JEJUNUM, TEMPORARY ABDOMINAL WALL CLOSURE, SEROSAL REPAIR  5/31/19: Central line placement  6/11/19: Transferred for short term care at [facility]. Oma DOWNEY RN CWOCN consulted the patient and per her note--[facility] facility will not be able to get a wound vac in house until possibly tomorrow so will have to initiate BID NS moist gauze dressings until then.  Ag isn't sure what day when the NPWT was placed  6/27/19: telephone encounter that patient is still at [facility] with a NPWT.  Saw Dr. Vilchis this day, who reports that patient is in the process of being discharge from the facility tomorrow.    6/28/19: telephone call to Wound Care.  Ag to be discharge on Monday with [facility] care services.    7/5/19: 1st wound care appt with Marissa Garg NP        Patient Active Problem List   Diagnosis     Advance care planning     SBO (small bowel obstruction) (H)     Sepsis (H)     Acute abdominal pain     Chronic atrial fibrillation (H)     Malignant neoplasm of transverse colon (H)     Small bowel perforation (H)     Cardiac pacemaker in situ       Past Medical History:   Diagnosis Date     Arrhythmia      Hypertension      Pacemaker        Past Surgical History:   Procedure Laterality Date     CARDIAC SURGERY       COLONOSCOPY       COLONOSCOPY N/A 7/24/2015    Procedure: COLONOSCOPY;  Surgeon: Kameron De Santiago MD;  Location: [facility]     LAPAROTOMY EXPLORATORY N/A 5/31/2019    Procedure: EXPLORATORY LAPAROTOMY WITH RESECTION OF SMALL BOWEL, JEJUNUM, TEMPORARY ABDOMINAL WALL CLOSURE, SEROSAL REPAIR;  Surgeon: Pastor Vilchis MD;  Location: [facility] OR     LAPAROTOMY EXPLORATORY N/A 6/2/2019    Procedure: EXPLORATORY LAPAROTOMY, SMALL BOWEL RESECTION-JEJUNUM, SMALL BOWEL ANASTOMOSIS, ABDOMINAL WASH OUT, FASCIAL CLOSURE;  Surgeon: Pastor Vilchis MD;  Location: [facility] OR       History reviewed. No pertinent family history.    Social  History     Tobacco Use     Smoking status: Former Smoker     Smokeless tobacco: Never Used   Substance Use Topics     Alcohol use: Not on file       Current Outpatient Medications   Medication Sig Dispense Refill     ASPIRIN PO Take 81 mg by mouth daily       atorvastatin (LIPITOR) 10 MG tablet Take 10 mg by mouth every evening       BENAZEPRIL HCL PO Take 40 mg by mouth daily       Ezetimibe (ZETIA PO) Take 10 mg by mouth At Bedtime       ferrous sulfate (FEROSUL) 325 (65 Fe) MG tablet Take 325 mg by mouth daily (with dinner)       LEVOTHYROXINE SODIUM PO Take 125 mcg by mouth daily        Multiple Vitamins-Minerals (THERAPEUTIC M) TABS Take 1 tablet by mouth daily       Nitroglycerin (NITROSTAT SL) Place 0.4 mg under the tongue every 5 minutes as needed for chest pain       Omega-3 Fatty Acids (OMEGA-3 FISH OIL PO) Take 1 g by mouth 2 times daily (with meals)        oxyCODONE (ROXICODONE) 5 MG tablet Take 0.5 tablets (2.5 mg) by mouth every 4 hours as needed for moderate to severe pain 30 tablet 0     QUEtiapine (SEROQUEL) 25 MG tablet Take 1 tablet (25 mg) by mouth daily       QUEtiapine (SEROQUEL) 50 MG tablet Take 1 tablet (50 mg) by mouth every evening       tamsulosin (FLOMAX) 0.4 MG capsule Take 0.4 mg by mouth every evening         Allergies   Allergen Reactions     Crestor [Rosuvastatin]        REVIEW OF SYSTEMS  Skin: as above  Eyes: negative  Ears/Nose/Throat: negative  Respiratory: No shortness of breath, dyspnea on exertion, cough, or hemoptysis  Cardiovascular: positive for irregular heart beat  Gastrointestinal: as above  Genitourinary: negative  Musculoskeletal: negative  Neurologic: forgetful and a little confused related to his medical care  Psychiatric: negative  Hematologic/Lymphatic/Immunologic: negative  Endocrine: positive for prediabetes    OBJECTIVE:    B/P: 136/58, T: 97.8, P: 92, R: 16, W: 0 lbs 0 oz, BMI: There is no height or weight on file to calculate BMI.  Constitutional: alert and  no distress  Head: Normocephalic. No masses, lesions, tenderness or abnormalities  Cardiovascular: Irregular rhythm, regular rate. No murmurs, clicks gallops or rub  Respiratory:  Good diaphragmatic excursion. Lungs clear  Gastrointestinal: Abdomen soft, non-tender. BS normal. No masses, organomegaly  : Deferred  Skin:        Wound description:     Type of Wound:  Surgical   Location:  Midline abdomen    Drainage amount:  moderate   Drainage color:  Creamy    Odor:  yes   Wound bed:  See picture from 7/8/19   Surrounding skin:  WDL        Measurements:  Not measured today   Pain:  denies       Dressing change:      Cleaned around wound with mild soap, rinsed, dried.  Wound base cleansed with gauze dampened with acetic acid.  Dressed with acetic acid dampened kerlix, covered with dry gauze and 5x9 ABD pad, secured with medipore tape.    Psychiatric: affect normal/bright and confusion noted    THERAPY GOAL:    Moisture balance    ASSESSMENT / PLAN:  Comments:  The drainage has a bit of a green tinge and there is some odor again today, but improved from yesterday, so I used acetic acid with this dressing change then will go back to the normal saline with the morning dressing.    Plan:  BID dressing changes with normal saline dampened rolled gauze to wound base, tunnel, and undermining.  Cover with dry gauze and 5x9 ABD pad, secure with Medipore tape.    Follow-up as scheduled with Dr. Vilchis on 7/10/19.    Sylvie Estrada CNS  Surgery and Wound Care  [facility]

## 2019-07-09 NOTE — NURSING NOTE
"Chief Complaint   Patient presents with     WOUND CARE       Initial /76 (BP Location: Left arm, Patient Position: Sitting, Cuff Size: Adult Regular)   Pulse 60   Temp 97.8  F (36.6  C) (Tympanic)   Resp 16  Estimated body mass index is 37.05 kg/m  as calculated from the following:    Height as of 6/27/19: 1.651 m (5' 5\").    Weight as of 6/27/19: 101 kg (222 lb 10.6 oz).  Medication Reconciliation: complete  "

## 2019-07-10 ENCOUNTER — ALLIED HEALTH/NURSE VISIT (OUTPATIENT)
Dept: SURGERY | Facility: OTHER | Age: 79
End: 2019-07-10
Payer: COMMERCIAL

## 2019-07-10 ENCOUNTER — OFFICE VISIT (OUTPATIENT)
Dept: SURGERY | Facility: OTHER | Age: 79
End: 2019-07-10
Attending: SURGERY
Payer: MEDICARE

## 2019-07-10 VITALS
DIASTOLIC BLOOD PRESSURE: 68 MMHG | BODY MASS INDEX: 33.04 KG/M2 | WEIGHT: 218 LBS | SYSTOLIC BLOOD PRESSURE: 134 MMHG | OXYGEN SATURATION: 96 % | HEART RATE: 60 BPM | TEMPERATURE: 97.4 F | HEIGHT: 68 IN

## 2019-07-10 DIAGNOSIS — Z09 POSTOP CHECK: Primary | ICD-10-CM

## 2019-07-10 DIAGNOSIS — S31.109D OPEN ABDOMINAL WALL WOUND, SUBSEQUENT ENCOUNTER: Primary | ICD-10-CM

## 2019-07-10 PROCEDURE — 97605 NEG PRS WND THER DME<=50SQCM: CPT

## 2019-07-10 PROCEDURE — G0463 HOSPITAL OUTPT CLINIC VISIT: HCPCS

## 2019-07-10 PROCEDURE — 99024 POSTOP FOLLOW-UP VISIT: CPT | Performed by: SURGERY

## 2019-07-10 ASSESSMENT — PAIN SCALES - GENERAL: PAINLEVEL: MILD PAIN (3)

## 2019-07-10 ASSESSMENT — MIFFLIN-ST. JEOR: SCORE: 1668.34

## 2019-07-10 NOTE — NURSING NOTE
"Chief Complaint   Patient presents with     Consult For     exploratory laparoscopic 6/27/19       Initial /68 (BP Location: Left arm, Patient Position: Chair, Cuff Size: Adult Large)   Pulse 60   Temp 97.4  F (36.3  C) (Tympanic)   Ht 1.727 m (5' 8\")   Wt 98.9 kg (218 lb)   SpO2 96%   BMI 33.15 kg/m   Estimated body mass index is 33.15 kg/m  as calculated from the following:    Height as of this encounter: 1.727 m (5' 8\").    Weight as of this encounter: 98.9 kg (218 lb).  Medication Reconciliation: complete   JOSIE LOOMIS      "

## 2019-07-10 NOTE — TELEPHONE ENCOUNTER
Patient is discharged from [facility] and is now followed by [facility].  He was seen in clinic today and [facility] was updated.

## 2019-07-10 NOTE — PROGRESS NOTES
"SUBJECTIVE:  Mr. Perez presents for wound check. He was undergoing VAC therapy at a rehab/nursing home facility and had been evaluated in late June. At that time they had just changed his VAC that morning so the VAC was not taken down. When he was seen recently in wound care the wound vac was discontinued and he was changed over to BID dressing changes. He has been tolerating dressing change with [facility] health coming to do the changes once a day. Over the weekend he had to come to the  for changes.    OBJECTIVE:  /68 (BP Location: Left arm, Patient Position: Chair, Cuff Size: Adult Large)   Pulse 60   Temp 97.4  F (36.3  C) (Tympanic)   Ht 1.727 m (5' 8\")   Wt 98.9 kg (218 lb)   SpO2 96%   BMI 33.15 kg/m      Gen: AAA, NAD  AB: soft, non-tender, non-distended please refer to wound care note for measurements from 7/8. The wound base demonstrates fascial dehiscense. There are visible sutures. There is granulation between the fascial separation. There is no visible bowel. There is tunneling to the inferior portion of the wound. Mild fibrinous debri along the base and edges    ASSESSMENT/PLAN:  78 yo male s/p exploratory laparotomy for small bowel perforation    I discussed that at this time there appears to be fascial separation but the wound is intact with no visible bowel. I told the patient and his son that there are multiple contributing factors for this and despite closure with two techniques it still failed, much like it had done with his initial operation. I recommend that we resume wound vac therapy with a contact layer to the base overlying the fascial separation. Then use the white foam and then a black foam. This was discussed with wound care. He will then follow with wound care and I will see weekly for evaluation. All questions and concerns were addressed with adequate time spent answering all concerns.    Pastor Vilchis Md    "

## 2019-07-10 NOTE — NURSING NOTE
Ag was seen post appointment with Dr. Vilchis for placement of wound vac.    He receives home care services with [facility].    Marissa Garg NP and Cheli MORRISON were in the room for planning of care.    Cheli ARANA and myself place wound vac dressing.  Removed abdominal wound dressings and cleansed with NS.  Wound vac dressing performed:    Magaly wound skin protected with skin barrier wipe, and Duoderm CGF.   Oil emulsion guaze for contact layer to base of wound.  Inserted 3 white foams and 2 black foams.    Draped wound.   Confirmed seal check and vac settings (125 mm/hg, low, continuous)  prior to departure.    Performed wound vac education.    Follow up with wound care center on Friday July 12 th at 10:00 am with Marissa Garg NP.    Treatment plan:  NPWT dressing changes MWF.  Next dressing change at wound center.  No home care visits needed prior to visit with wound care on July 12th.    Updated Julienne Owens at [facility].

## 2019-07-12 ENCOUNTER — HOSPITAL ENCOUNTER (OUTPATIENT)
Dept: WOUND CARE | Facility: HOSPITAL | Age: 79
Discharge: HOME | End: 2019-07-12
Attending: FAMILY MEDICINE | Admitting: FAMILY MEDICINE
Payer: MEDICARE

## 2019-07-12 VITALS
HEART RATE: 59 BPM | DIASTOLIC BLOOD PRESSURE: 74 MMHG | TEMPERATURE: 98.2 F | RESPIRATION RATE: 16 BRPM | SYSTOLIC BLOOD PRESSURE: 139 MMHG

## 2019-07-12 PROCEDURE — 97605 NEG PRS WND THER DME<=50SQCM: CPT

## 2019-07-12 NOTE — PROGRESS NOTES
"Ag Perez, 1940  Chief Complaint   Patient presents with     WOUND CARE       Patient Active Problem List   Diagnosis     Advance care planning     SBO (small bowel obstruction) (H)     Sepsis (H)     Acute abdominal pain     Chronic atrial fibrillation (H)     Malignant neoplasm of transverse colon (H)     Small bowel perforation (H)     Cardiac pacemaker in situ       Concerns/Comments:   Ag Perez is here for re-evaluation and tx of open abdominal wall wound.    Receives home care services through [facility].    Wound History (Per Marissa Garg NP note on 7/5/2019):  On 3/12/2019; Pt had a left tansverse hemicolectomy at [facility] for invasive moderate to focally poorly differentiated colonic adenocarcinoma. He declined chemotherapy.   On 5/30/2019; Presented in the ED with acute onset of abdominal pain. He was in the bathroom prior when he felt a \"Pop\" and developed abdominal pain.  5/30/2019-6/11/2019 Ag was hospitalized for small bowel perforation, acute metabolic encephalopathy, metabolic and respiratory acidosis, severe sepsis with septic shock, acute kidney injury. Concerns with incarcerated incisional hernia.  5/31/2019: Surgery performed by Dr. Vilchis. Exploratory Laparotomy with resection of small bowel, jejunum, temporary abdominal wall closure, serosal repair.     Ag arrived with his son. Wound vac was not intact. Ag reports that earlier this morning when he got up he was in a hurry to get to the bathroom and the tubing got hooked on the dresser nob and got pulled off. Draping and foam remain intact but disc was completely removed. Ag reported the vac was off for about 3 hours. Did re-education on what to do if wound vac becomes disconnected or off for more then 2 hours.    Objective:   /74 (BP Location: Left arm)   Pulse 59   Temp 98.2  F (36.8  C) (Tympanic)   Resp 16      Midline open abdominal wall wound  Drainage - appearance/amount:  Small amount of " serosanguinous drainage with a slight green tint to some of the drainage in canister.   Odor:  none  Measurement:  No measurement taken this visit.  Underminin:00 edge, 5-8:00 edge are areas of undermining  Edges:  open  Base: Pink/Red with areas of white slough scattered to a few areas of wound base.  Periwound skin/tissue: intact. No erythema. No excessive warmth.     Ag denied pain.    Procedures:   Cleanse and assessed.  Dressed per plan of care.    Assessment/Response to tx:  Marissa Garg NP was in the room for assessment and planning of care.  Decrease in adhered slough to base of wound.  No odor to wound post cleansing.  No signs or symptoms of infection.    Plan of care:   Wound vac dressing change done.    Removed 3 white foams and 2 black foams. Cleansed with Microcyn to base of wound and mild soap to periwound .Sheila DAVIDSON helped with wound vac dressing. Magaly wound skin protected with Skin barrier wipe, and DuoDerm CGF.  Placed oil emulsion gauze to base of wound. Inserted 4 white foams and 1 black foam with 1 black foam for disc landing.  Draped wound.  Confirmed seal check and vac settings (125 mm/hg, low, continuous)  prior to departure.    NPWT - M-W- dressing changes  Home care services to do 2 NPWT dressing changes per week  Updated Julienne Owens ([phone]) at [facility] include [facility] beds at the [facility] on plan of care.    Follow up with wound care center on  8am    Treatment Goal:  Manage drainage  Prevent infection  Epithelial migration    Supplies provided:  Julienne Owens from [facility] picked up supplies for Monday and Wednesday dressings.    White foam    Oil emulsion gauze    DuoDerm CGF    Education:  Wound care instructions/education provided. Patient verbalized understanding of instruction/education.    CC: Ary Montanez/Dr. Vilchis        Noted and agreed.     Marissa Garg APRN [facility]  Diabetes and Wound Care

## 2019-07-18 ENCOUNTER — TELEPHONE (OUTPATIENT)
Dept: WOUND CARE | Facility: HOSPITAL | Age: 79
End: 2019-07-18

## 2019-07-18 NOTE — TELEPHONE ENCOUNTER
Florencia stated that she would like a call back she has questions on the orders for the patient [phone]

## 2019-07-18 NOTE — TELEPHONE ENCOUNTER
Returned [facility] call and answered her questions for the wound vac. She was wondering who was ordering the wound vac dressings. I let her know that we will be ordering them. She verbalized understanding.

## 2019-07-19 ENCOUNTER — HOSPITAL ENCOUNTER (OUTPATIENT)
Dept: WOUND CARE | Facility: HOSPITAL | Age: 79
Discharge: HOME | End: 2019-07-19
Attending: NURSE PRACTITIONER | Admitting: SURGERY
Payer: MEDICARE

## 2019-07-19 VITALS
TEMPERATURE: 97.4 F | DIASTOLIC BLOOD PRESSURE: 71 MMHG | HEART RATE: 59 BPM | SYSTOLIC BLOOD PRESSURE: 155 MMHG | RESPIRATION RATE: 16 BRPM

## 2019-07-19 PROCEDURE — 97605 NEG PRS WND THER DME<=50SQCM: CPT

## 2019-07-19 NOTE — PROGRESS NOTES
"Chief Complaint   Patient presents with     WOUND CARE       Initial /68 (BP Location: Left arm)   Pulse 59   Temp 97.4  F (36.3  C) (Tympanic)   Resp 16  Estimated body mass index is 33.15 kg/m  as calculated from the following:    Height as of 7/10/19: 1.727 m (5' 8\").    Weight as of 7/10/19: 98.9 kg (218 lb).  Medication Reconciliation: complete  "

## 2019-07-19 NOTE — PROGRESS NOTES
"Ag Perez, 1940  Chief Complaint   Patient presents with     WOUND CARE       Patient Active Problem List   Diagnosis     Advance care planning     SBO (small bowel obstruction) (H)     Sepsis (H)     Acute abdominal pain     Chronic atrial fibrillation (H)     Malignant neoplasm of transverse colon (H)     Small bowel perforation (H)     Cardiac pacemaker in situ       Concerns/Comments:   Ag Perez is here for re-evaluation and tx of open abdominal wall wound.     Receives home care services through [facility].   Receives Dialysis on MWF.     Wound History (Per Marissa Garg NP note on 2019):  On 3/12/2019; Pt had a left tansverse hemicolectomy at [facility] for invasive moderate to focally poorly differentiated colonic adenocarcinoma. He declined chemotherapy.   On 2019; Presented in the ED with acute onset of abdominal pain. He was in the bathroom prior when he felt a \"Pop\" and developed abdominal pain.  2019-2019 Ag was hospitalized for small bowel perforation, acute metabolic encephalopathy, metabolic and respiratory acidosis, severe sepsis with septic shock, acute kidney injury. Concerns with incarcerated incisional hernia.  2019: Surgery performed by Dr. Vilchis. Exploratory Laparotomy with resection of small bowel, jejunum, temporary abdominal wall closure, serosal repair.     Ag arrived with wound vac in place. Denied any pain or issues at this time.    Objective:   /71 (BP Location: Left arm)   Pulse 59   Temp 97.4  F (36.3  C) (Tympanic)   Resp 16      Midline open abdominal wall wound    Small amount of serosanguinous drainage with a slight green tint to some of the drainage in canister. There was also some green drainage to the base of the wound that was removed during cleansing.    No odor    Measurement:  12.6 x 3.8 x 2.9 cm    Underminin:00 edge, 5-8:00 edge are areas of undermining. Tunnel at 5:00 edge - 6.5 cm deep    Edges:  " open    Base: Pink/Red with areas of white slough scattered to a few areas of wound base. Visible sutures.    Periwound skin/tissue: intact. No erythema. No excessive warmth.      Ag denied pain.    Procedures:   Cleansed and assessed.  Dressed per plan of care.    Assessment/Response to tx:  Oma MCCAULEY CWOCN was in the room for assessment and planning of care.  Decrease in adhered slough to base of wound  Odor noted prior to cleansing.  No signs or symptoms of infection.    Plan of care:     Removed 2 white foams and 2 black foams. Cleansed with Microcyn to base of wound and mild soap to periwound .Sheila DAVIDSON helped with wound vac dressing. Magaly wound skin protected with Skin barrier wipe, and DuoDerm CGF.  Placed oil emulsion gauze to base of wound. Inserted 2 white foams to base of wound, 1 white foam into 5:00 edge tunnel, and 1 black foam with 1 black foam for disc landing.  Draped wound.  Confirmed seal check and vac settings (125 mm/hg, low, continuous)  prior to departure. Reinforced disc with draping.    NPWT - M-W-F dressing changes  Home care services to do 2NPWT dressing changes per week.    Follow up with wound care center on July 26 at 2:00 pm    Updated Julienne Owens from [facility] on plan of care.    Treatment Goal:  Manage drainage  Prevent infection  Epithelial migration    Supplies provided:  N/A    Education:  Wound care instructions/education provided. Patient verbalized understanding of instruction/education.    CC: Ary Montanez/Dr. Vilchis

## 2019-07-22 ENCOUNTER — TELEPHONE (OUTPATIENT)
Dept: WOUND CARE | Facility: HOSPITAL | Age: 79
End: 2019-07-22

## 2019-07-22 NOTE — TELEPHONE ENCOUNTER
Dasia the nurse at the home care facility would like  A call back she is wanting to know exactly what supplies were ordered for the patient please call [phone]

## 2019-07-24 ENCOUNTER — TELEPHONE (OUTPATIENT)
Dept: WOUND CARE | Facility: HOSPITAL | Age: 79
End: 2019-07-24

## 2019-07-24 NOTE — TELEPHONE ENCOUNTER
Julienne had called to inform us that the dressings had not arrived from [facility]. Sheila followed up with [facility] and found out that it will be delivered tomorrow. Updated Julienne.

## 2019-07-26 ENCOUNTER — HOSPITAL ENCOUNTER (OUTPATIENT)
Dept: WOUND CARE | Facility: HOSPITAL | Age: 79
Discharge: HOME | End: 2019-07-26
Attending: NURSE PRACTITIONER | Admitting: SURGERY
Payer: MEDICARE

## 2019-07-26 VITALS — DIASTOLIC BLOOD PRESSURE: 93 MMHG | SYSTOLIC BLOOD PRESSURE: 148 MMHG

## 2019-07-26 PROCEDURE — 97605 NEG PRS WND THER DME<=50SQCM: CPT

## 2019-07-26 NOTE — IP AVS SNAPSHOT
MRN:[id number]                      After Visit Summary   7/26/2019    Ag Perez    MRN: [id number]           Visit Information        Provider Department      7/26/2019  2:00 PM [facility]ARE [facility]omy           Review of your medicines      UNREVIEWED medicines. Ask your doctor about these medicines       Dose / Directions   ASPIRIN PO      Dose:  81 mg  Take 81 mg by mouth daily  Refills:  0     atorvastatin 10 MG tablet  Commonly known as:  LIPITOR      Dose:  10 mg  Take 10 mg by mouth every evening  Refills:  0     BENAZEPRIL HCL PO      Dose:  40 mg  Take 40 mg by mouth daily  Refills:  0     ferrous sulfate 325 (65 Fe) MG tablet  Commonly known as:  FEROSUL      Dose:  325 mg  Take 325 mg by mouth daily (with dinner)  Refills:  0     FLOMAX 0.4 MG capsule  Generic drug:  tamsulosin      Dose:  0.4 mg  Take 0.4 mg by mouth every evening  Refills:  0     LEVOTHYROXINE SODIUM PO      Dose:  125 mcg  Take 125 mcg by mouth daily  Refills:  0     NITROSTAT SL      Dose:  0.4 mg  Place 0.4 mg under the tongue every 5 minutes as needed for chest pain  Refills:  0     OMEGA-3 FISH OIL PO      Dose:  1 g  Take 1 g by mouth 2 times daily (with meals)  Refills:  0     THERAPEUTIC M Tabs      Dose:  1 tablet  Take 1 tablet by mouth daily  Refills:  0     ZETIA PO      Dose:  10 mg  Take 10 mg by mouth At Bedtime  Refills:  0              Protect others around you: Learn how to safely use, store and throw away your medicines at www.disposemymeds.org.       Follow-ups after your visit       Your next 10 appointments already scheduled    Jul 26, 2019  2:00 PM CDT  Return Visit with [facility] WOUND CARE  [facility] Wound Ostomy ([facility] ) [address] [address]  [phone]      Jul 30, 2019 11:00 AM CDT  (Arrive by 10:45 AM)  [facility] [address] [place] [address]  [id number]  [phone]      Aug 02, 2019  8:00 AM CDT  Return Visit with [facility] WOUND CARE  [facility] Wound Ostomy ([facility] ) [address] [address] [phone]      Aug 09, 2019  8:00 AM CDT  Return Visit with [facility]  [facility] ([facility] ) [address] [address] [phone]      Aug 16, 2019  8:00 AM CDT  Return Visit with [facility]  [facility] Ostomy ([facility] ) [address] [address] [phone]         Care Instructions       Further instructions from your care team       Bring to your wound care appointments one V.A.C whitefoam dressing    Additional Information About Your Visit       Care EveryWhere ID    This is your Care EveryWhere ID. This could be used by other organizations to access your [facility] medical records  [id number]        Primary Care Provider Office Phone # Fax #    Ary Montanez [phone]      Equal Access to Services    [facility]: Hadii aad ku hadasho Soomaali, waaxda luqadaha, qaybta kaalmada adeegyada, waxay abdirizak haynico moon . So [facility] [phone].    ATENCIÓN: Si habla español, tiene a garcia disposición servicios gratuitos de asistencia lingüística. Llame al [phone].    We comply with applicable federal civil rights laws and [place] laws. We do not discriminate on the basis of race, color, national origin, age, disability, sex, sexual orientation, or gender identity.           Thank you!    Thank you for choosing [facility] for your care. Our goal is always to provide you with excellent care. Hearing back from our patients is one way we can continue to improve our services. Please take a few minutes to complete the written survey that you may receive in the mail after you visit with us. Thank you!            Medication List      ASK your doctor about these medications          Morning Afternoon Evening Bedtime As Needed     ASPIRIN PO  INSTRUCTIONS:  Take 81 mg by mouth daily                     atorvastatin 10 MG tablet  Also known as:  LIPITOR  INSTRUCTIONS:  Take 10 mg by mouth every evening                     BENAZEPRIL HCL PO  INSTRUCTIONS:  Take 40 mg by mouth daily                     ferrous sulfate 325 (65 Fe) MG tablet  Also known as:  FEROSUL  INSTRUCTIONS:  Take 325 mg by mouth daily (with dinner)                     FLOMAX 0.4 MG capsule  INSTRUCTIONS:  Take 0.4 mg by mouth every evening  Generic drug:  tamsulosin                     LEVOTHYROXINE SODIUM PO  INSTRUCTIONS:  Take 125 mcg by mouth daily                     NITROSTAT SL  INSTRUCTIONS:  Place 0.4 mg under the tongue every 5 minutes as needed for chest pain                     OMEGA-3 FISH OIL PO  INSTRUCTIONS:  Take 1 g by mouth 2 times daily (with meals)                     THERAPEUTIC M Tabs  INSTRUCTIONS:  Take 1 tablet by mouth daily                     ZETIA PO  INSTRUCTIONS:  Take 10 mg by mouth At Bedtime

## 2019-07-26 NOTE — PROGRESS NOTES
"Ag Perez, 1940  Chief Complaint   Patient presents with     WOUND CARE       Patient Active Problem List   Diagnosis     Advance care planning     SBO (small bowel obstruction) (H)     Sepsis (H)     Acute abdominal pain     Chronic atrial fibrillation (H)     Malignant neoplasm of transverse colon (H)     Small bowel perforation (H)     Cardiac pacemaker in situ       Concerns/Comments:   Ag Perez is here for re-evaluation and tx of open abdominal wall wound.     Receives home care services through [facility].   Receives Dialysis on MWF.     Wound History (Per Marissa Garg NP note on 7/5/2019):  On 3/12/2019; Pt had a left tansverse hemicolectomy at [facility] for invasive moderate to focally poorly differentiated colonic adenocarcinoma. He declined chemotherapy.   On 5/30/2019; Presented in the ED with acute onset of abdominal pain. He was in the bathroom prior when he felt a \"Pop\" and developed abdominal pain.  5/30/2019-6/11/2019 Ag was hospitalized for small bowel perforation, acute metabolic encephalopathy, metabolic and respiratory acidosis, severe sepsis with septic shock, acute kidney injury. Concerns with incarcerated incisional hernia.  5/31/2019: Surgery performed by Dr. Vilchis. Exploratory Laparotomy with resection of small bowel, jejunum, temporary abdominal wall closure, serosal repair.     Ag arrived with wound vac in place however it had started alarming in the garcia due to full canister and was not running for a few minutes prior to his appt. He did not hear it alarming.    Present at 8:00 in the morning for a 2:00 afternoon appt. He normally comes at 8:00 so there was some confusion.    Denied any pain or issues at this time.    Objective:     Midline open abdominal wall wound    Moderate amount of serous drainage with a slight green tint to some of the drainage in the canister.     No odor    Measurement:  12.0 x 3.5 x 2.4 cm    Undermining:  Distal half of wound " undermines circumferentially (6.3cm at 6:00) 11:00 undermines 2.6cm     Edges:  open    Base: Pink/moist, visible sutures.    Periwound skin/tissue: intact. No erythema. No excessive warmth.     Procedures:   Cleansed and assessed.  Dressed per plan of care.    Assessment/Response to tx:  No slough to base.  Mild odor noted prior to cleansing.  No signs or symptoms of infection.  Measures smaller.    Plan of care:   Removed 3 white foams and 2 black foams. Cleansed with Microcyn to base of wound and mild soap to periwound .Sheila DAVIDSON helped with wound vac dressing. Magaly wound skin protected with Skin barrier wipe, and DuoDerm CGF.  Placed oil emulsion gauze to base of wound. Inserted 1 white foam to distal base of wound. 2 black foams to remainder of wound bed. Confirmed seal check and vac settings (125 mm/hg, low, continuous) prior to departure.     NPWT - M-W-F dressing changes  Home care services to do 2 NPWT dressing changes per week.    Follow up with wound care center on Friday.    Treatment Goal:  Manage drainage  Prevent infection  Epithelial migration    Supplies provided:  N/A    Education:  Wound care instructions/education provided. Patient verbalized understanding of instruction/education.    CC: Ary Montanez/Dr. Vilchis

## 2019-07-29 NOTE — PATIENT INSTRUCTIONS
Thank you for allowing Dr. Vilchis and our surgical team to participate in your care. Please call our health unit coordinator at [phone] with scheduling questions or the nurse at [phone] with any other questions or concerns.

## 2019-07-30 ENCOUNTER — OFFICE VISIT (OUTPATIENT)
Dept: SURGERY | Facility: OTHER | Age: 79
End: 2019-07-30
Attending: SURGERY
Payer: MEDICARE

## 2019-07-30 VITALS
TEMPERATURE: 98 F | OXYGEN SATURATION: 95 % | SYSTOLIC BLOOD PRESSURE: 112 MMHG | HEIGHT: 68 IN | WEIGHT: 215.17 LBS | HEART RATE: 88 BPM | BODY MASS INDEX: 32.61 KG/M2 | DIASTOLIC BLOOD PRESSURE: 66 MMHG

## 2019-07-30 DIAGNOSIS — Z09 POSTOP CHECK: Primary | ICD-10-CM

## 2019-07-30 PROCEDURE — G0463 HOSPITAL OUTPT CLINIC VISIT: HCPCS

## 2019-07-30 PROCEDURE — 99024 POSTOP FOLLOW-UP VISIT: CPT | Performed by: SURGERY

## 2019-07-30 ASSESSMENT — PAIN SCALES - GENERAL: PAINLEVEL: NO PAIN (0)

## 2019-07-30 ASSESSMENT — MIFFLIN-ST. JEOR: SCORE: 1655.5

## 2019-07-30 NOTE — PROGRESS NOTES
"SUBJECTIVE:  Mr Perez presents for a wound check. He says that he has been doing well with no concerns. He has been undergoing wound vac changes with no issues.    OBJECTIVE:  /66   Pulse 88   Temp 98  F (36.7  C)   Ht 1.727 m (5' 8\")   Wt 97.6 kg (215 lb 2.7 oz)   SpO2 95%   BMI 32.72 kg/m      Gen: AAA, NAD  Ab: soft, non-tender, non-distended, VAC dressing removed and the underlying tissue well granulated with no purulent discharge. The central aspect where the fascia has  is intact with no drainage. No visible bowel or enteric contents noted    ASSESSMENT/PLAN:  78 yo male s/p exploratory laparotomy for small bowel perforation     He is doing quite well and he can continue VAC therapy. He will then follow with wound care and I will see bi-weekly for evaluation. All questions and concerns were addressed with adequate time spent answering all concerns.      Pastor Vilchis MD  "

## 2019-07-30 NOTE — NURSING NOTE
"Chief Complaint   Patient presents with     RECHECK     abdominal wound check. Exploratory laparoscopic 6/27/19       Initial /66   Pulse 88   Temp 98  F (36.7  C)   Ht 1.727 m (5' 8\")   Wt 97.6 kg (215 lb 2.7 oz)   SpO2 95%   BMI 32.72 kg/m   Estimated body mass index is 32.72 kg/m  as calculated from the following:    Height as of this encounter: 1.727 m (5' 8\").    Weight as of this encounter: 97.6 kg (215 lb 2.7 oz).  Medication Reconciliation: complete    "

## 2019-07-31 ENCOUNTER — ALLIED HEALTH/NURSE VISIT (OUTPATIENT)
Dept: SURGERY | Facility: OTHER | Age: 79
End: 2019-07-31
Payer: COMMERCIAL

## 2019-07-31 ENCOUNTER — TELEPHONE (OUTPATIENT)
Dept: WOUND CARE | Facility: HOSPITAL | Age: 79
End: 2019-07-31

## 2019-07-31 DIAGNOSIS — S31.109D OPEN WOUND OF ABDOMINAL WALL, SUBSEQUENT ENCOUNTER: Primary | ICD-10-CM

## 2019-07-31 NOTE — PROGRESS NOTES
Pt seen in the clinic per request of Dr. Vilchis for NPWT dressing change. STUART Sosa present during visit. Cleansed wound with wound cleanser. Duoderm CGF to periwound skin. Oil emulsion gauze covered by 1 piece of white foam to distal 2/3rd of wound base. 1 small black foam to undermined area at 11:00. One black foam covering all. Adequate seal obtained at 125mm/Hg cont low.

## 2019-07-31 NOTE — TELEPHONE ENCOUNTER
Kaity from [facility] called this morning to report that on Monday when she arrived in the morning to do his dressing the wound vac was off and per Ag it was off since 4:00 in the morning. She explained that today on arrival it was off as well and Ag stated it was off since after his appt yesterday with Dr. Vilchis.   She spoke with him about the importance to have the vac running and to call the on call nurse if it should shut off.  He does admit he has memory issues. Maybe it would be beneficial to have some reminder notes around his house if this should happen again.  Kaity did cleanse the wounds well and changed the vac today.

## 2019-08-02 ENCOUNTER — HOSPITAL ENCOUNTER (OUTPATIENT)
Dept: WOUND CARE | Facility: HOSPITAL | Age: 79
Discharge: HOME | End: 2019-08-02
Attending: NURSE PRACTITIONER | Admitting: SURGERY
Payer: MEDICARE

## 2019-08-02 VITALS
SYSTOLIC BLOOD PRESSURE: 142 MMHG | TEMPERATURE: 98.1 F | HEART RATE: 100 BPM | DIASTOLIC BLOOD PRESSURE: 84 MMHG | RESPIRATION RATE: 16 BRPM

## 2019-08-02 PROCEDURE — 97605 NEG PRS WND THER DME<=50SQCM: CPT

## 2019-08-02 NOTE — PROGRESS NOTES
"Chief Complaint   Patient presents with     WOUND CARE       Initial /84 (BP Location: Left arm)   Pulse 100   Temp 98.1  F (36.7  C) (Tympanic)   Resp 16  Estimated body mass index is 32.72 kg/m  as calculated from the following:    Height as of 7/30/19: 1.727 m (5' 8\").    Weight as of 7/30/19: 97.6 kg (215 lb 2.7 oz).  Medication Reconciliation: complete  "

## 2019-08-02 NOTE — PROGRESS NOTES
"Ag Perez, 1940  Chief Complaint   Patient presents with     WOUND CARE       Patient Active Problem List   Diagnosis     Advance care planning     SBO (small bowel obstruction) (H)     Sepsis (H)     Acute abdominal pain     Chronic atrial fibrillation (H)     Malignant neoplasm of transverse colon (H)     Small bowel perforation (H)     Cardiac pacemaker in situ       Concerns/Comments:   Ag Perez is here for re-evaluation and tx of open abdominal wall wound.     Receives home care services through [facility].   Receives Dialysis on MWF.     Wound History (Per Marissa Garg NP note on 7/5/2019):  On 3/12/2019; Pt had a left tansverse hemicolectomy at 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 He declined chemotherapy.   On 5/30/2019; Presented in the ED with acute onset of abdominal pain. He was in the bathroom prior when he felt a \"Pop\" and developed abdominal pain.  5/30/2019-6/11/2019 Ag was hospitalized for small bowel perforation, acute metabolic encephalopathy, metabolic and respiratory acidosis, severe sepsis with septic shock, acute kidney injury. Concerns with incarcerated incisional hernia.  5/31/2019: Surgery performed by Dr. Vilchis. Exploratory Laparotomy with resection of small bowel, jejunum, temporary abdominal wall closure, serosal repair.     Per Julienne Lemos RN ([facility]) when she arrived on 7/31 to [facility] residence the vac was not on (looked at history on the vac and it appears as if it shut off on 7/30 at 1849 and was not restarted until she seen him on 7/31 at 0818. She also stated this happened the prior dressing change day but Ag stated it was only off for 4 hours. She has placed reminder sheets up in his home in different areas to remind him to call [facility] RN if this should happen again. Will continue to monitor his vac history at his visits.    Denies any pain or issues at this time.    Objective:     Midline " open abdominal wall wound    Moderate amount of serous milky viscous drainage with a slight green tint    Moderate odor before cleansing    Measurement:  10.0 x 3.8 x 4.7cm (depth measured to deepest area of the wound at 6:00 edge)    Undermining:  Center area of wound is deeper with its own undermining area that runs circumferentially (6.2cm at 6:00), to the surface level of the wound there us an undermined area that runs from 10:00-1:00 undermines, deepest at 11:00 3cm     Edges:  open    Base: Pink/moist, visible clear and dark sutures.    Periwound skin/tissue: intact. No erythema. No excessive warmth.     Procedures:   Cleansed and assessed. Flooded wound with Dakin's, noticed that when the base of the wound was pressed down upon to ensure the solution was getting to all cavities of the wound a few thin purulent green/tan viscous wads would rise to the surface (some of which had an oily appearance). Dr Rodriges was notified. Being pt was just seen by Dr. Vilchis and there was no evidence of infection he felt it was okay to continue with the vac.  Dressed per plan of care.    Assessment/Response to tx:  No slough to base.  Mild odor noted prior to cleansing.  No signs or symptoms of infection.  Measures smaller.    Plan of care:   Removed 1 white foam and 2 black foams and oil emulsion gauze. Cleansed with Microcyn to base of wound and mild soap to periwound. Flooded wound with Dakins and cleansed with gauze x 3. Sheila DAVIDSON helped with wound vac dressing. Magaly wound skin protected with Skin barrier wipe, and DuoDerm CGF.  Placed oil emulsion gauze to base of wound. Inserted 1 white foam to distal base of wound. 2 black foams to remainder of wound bed. Confirmed seal check and vac settings (125 mm/hg, low, continuous) prior to departure.     NPWT - M-W-F dressing changes  Home care services to do 2 NPWT dressing changes per week.    Follow up with wound care center on Friday's at 8:00 (this is very important to  him or he gets confused with his appt times and dates).    Treatment Goal:  Granulation tissue formation and epithelial migration without onset of infection.    Supplies provided:  N/A    Education:  Wound care instructions/education provided. Patient verbalized understanding of instruction/education.    CC: Ary Montanez/Dr. Vilchis

## 2019-08-05 ENCOUNTER — APPOINTMENT (OUTPATIENT)
Dept: GENERAL RADIOLOGY | Facility: HOSPITAL | Age: 79
DRG: 501 | End: 2019-08-05
Attending: INTERNAL MEDICINE
Payer: MEDICARE

## 2019-08-05 ENCOUNTER — HOSPITAL ENCOUNTER (INPATIENT)
Facility: HOSPITAL | Age: 79
LOS: 2 days | Discharge: ANOTHER HOSPITAL | DRG: 501 | End: 2019-08-08
Attending: INTERNAL MEDICINE | Admitting: SURGERY
Payer: MEDICARE

## 2019-08-05 ENCOUNTER — APPOINTMENT (OUTPATIENT)
Dept: CT IMAGING | Facility: HOSPITAL | Age: 79
DRG: 501 | End: 2019-08-05
Attending: INTERNAL MEDICINE
Payer: MEDICARE

## 2019-08-05 ENCOUNTER — HOSPITAL ENCOUNTER (OUTPATIENT)
Dept: ULTRASOUND IMAGING | Facility: HOSPITAL | Age: 79
Discharge: HOME | DRG: 501 | End: 2019-08-05
Attending: FAMILY MEDICINE | Admitting: FAMILY MEDICINE
Payer: MEDICARE

## 2019-08-05 DIAGNOSIS — M79.89 LEFT ARM SWELLING: ICD-10-CM

## 2019-08-05 DIAGNOSIS — L02.91 ABSCESS: ICD-10-CM

## 2019-08-05 DIAGNOSIS — R50.9 FEVER, UNSPECIFIED FEVER CAUSE: ICD-10-CM

## 2019-08-05 DIAGNOSIS — M79.89 OTHER SPECIFIED SOFT TISSUE DISORDERS: ICD-10-CM

## 2019-08-05 DIAGNOSIS — L02.413 ABSCESS OF RIGHT SHOULDER: ICD-10-CM

## 2019-08-05 LAB
ALBUMIN SERPL-MCNC: 1.6 G/DL (ref 3.4–5)
ALP SERPL-CCNC: 147 U/L (ref 40–150)
ALT SERPL W P-5'-P-CCNC: 67 U/L (ref 0–70)
ANION GAP SERPL CALCULATED.3IONS-SCNC: 7 MMOL/L (ref 3–14)
AST SERPL W P-5'-P-CCNC: 44 U/L (ref 0–45)
BASOPHILS # BLD AUTO: 0 10E9/L (ref 0–0.2)
BASOPHILS NFR BLD AUTO: 0.1 %
BILIRUB SERPL-MCNC: 1.5 MG/DL (ref 0.2–1.3)
BUN SERPL-MCNC: 24 MG/DL (ref 7–30)
CALCIUM SERPL-MCNC: 8.3 MG/DL (ref 8.5–10.1)
CHLORIDE SERPL-SCNC: 102 MMOL/L (ref 94–109)
CO2 SERPL-SCNC: 26 MMOL/L (ref 20–32)
CREAT SERPL-MCNC: 0.81 MG/DL (ref 0.66–1.25)
CRP SERPL-MCNC: 197 MG/L (ref 0–8)
DIFFERENTIAL METHOD BLD: ABNORMAL
EOSINOPHIL # BLD AUTO: 0 10E9/L (ref 0–0.7)
EOSINOPHIL NFR BLD AUTO: 0 %
ERYTHROCYTE [DISTWIDTH] IN BLOOD BY AUTOMATED COUNT: 16.5 % (ref 10–15)
GFR SERPL CREATININE-BSD FRML MDRD: 83 ML/MIN/{1.73_M2}
GLUCOSE SERPL-MCNC: 126 MG/DL (ref 70–99)
HCT VFR BLD AUTO: 26.3 % (ref 40–53)
HGB BLD-MCNC: 8.8 G/DL (ref 13.3–17.7)
IMM GRANULOCYTES # BLD: 0.1 10E9/L (ref 0–0.4)
IMM GRANULOCYTES NFR BLD: 0.7 %
LACTATE BLD-SCNC: 1.5 MMOL/L (ref 0.7–2)
LIPASE SERPL-CCNC: 52 U/L (ref 73–393)
LYMPHOCYTES # BLD AUTO: 0.4 10E9/L (ref 0.8–5.3)
LYMPHOCYTES NFR BLD AUTO: 3.3 %
MCH RBC QN AUTO: 28.4 PG (ref 26.5–33)
MCHC RBC AUTO-ENTMCNC: 33.5 G/DL (ref 31.5–36.5)
MCV RBC AUTO: 85 FL (ref 78–100)
MONOCYTES # BLD AUTO: 0.5 10E9/L (ref 0–1.3)
MONOCYTES NFR BLD AUTO: 3.7 %
NEUTROPHILS # BLD AUTO: 12 10E9/L (ref 1.6–8.3)
NEUTROPHILS NFR BLD AUTO: 92.2 %
NRBC # BLD AUTO: 0 10*3/UL
NRBC BLD AUTO-RTO: 0 /100
PLATELET # BLD AUTO: 295 10E9/L (ref 150–450)
POTASSIUM SERPL-SCNC: 3.4 MMOL/L (ref 3.4–5.3)
PROT SERPL-MCNC: 6.1 G/DL (ref 6.8–8.8)
RBC # BLD AUTO: 3.1 10E12/L (ref 4.4–5.9)
SODIUM SERPL-SCNC: 135 MMOL/L (ref 133–144)
WBC # BLD AUTO: 13 10E9/L (ref 4–11)

## 2019-08-05 PROCEDURE — 81001 URINALYSIS AUTO W/SCOPE: CPT | Performed by: INTERNAL MEDICINE

## 2019-08-05 PROCEDURE — 83605 ASSAY OF LACTIC ACID: CPT | Performed by: INTERNAL MEDICINE

## 2019-08-05 PROCEDURE — 36415 COLL VENOUS BLD VENIPUNCTURE: CPT | Performed by: INTERNAL MEDICINE

## 2019-08-05 PROCEDURE — 76942 ECHO GUIDE FOR BIOPSY: CPT | Mod: 26 | Performed by: NURSE ANESTHETIST, CERTIFIED REGISTERED

## 2019-08-05 PROCEDURE — 87088 URINE BACTERIA CULTURE: CPT | Performed by: INTERNAL MEDICINE

## 2019-08-05 PROCEDURE — 64415 NJX AA&/STRD BRCH PLXS IMG: CPT | Mod: 59 | Performed by: NURSE ANESTHETIST, CERTIFIED REGISTERED

## 2019-08-05 PROCEDURE — 71260 CT THORAX DX C+: CPT | Mod: TC

## 2019-08-05 PROCEDURE — 99285 EMERGENCY DEPT VISIT HI MDM: CPT | Mod: Z6 | Performed by: INTERNAL MEDICINE

## 2019-08-05 PROCEDURE — 74177 CT ABD & PELVIS W/CONTRAST: CPT | Mod: TC

## 2019-08-05 PROCEDURE — 83690 ASSAY OF LIPASE: CPT | Performed by: INTERNAL MEDICINE

## 2019-08-05 PROCEDURE — 87186 SC STD MICRODIL/AGAR DIL: CPT | Performed by: INTERNAL MEDICINE

## 2019-08-05 PROCEDURE — 99285 EMERGENCY DEPT VISIT HI MDM: CPT | Mod: 25

## 2019-08-05 PROCEDURE — 96361 HYDRATE IV INFUSION ADD-ON: CPT

## 2019-08-05 PROCEDURE — 80053 COMPREHEN METABOLIC PANEL: CPT | Performed by: INTERNAL MEDICINE

## 2019-08-05 PROCEDURE — 25000128 H RX IP 250 OP 636: Performed by: INTERNAL MEDICINE

## 2019-08-05 PROCEDURE — 87040 BLOOD CULTURE FOR BACTERIA: CPT | Performed by: INTERNAL MEDICINE

## 2019-08-05 PROCEDURE — 25500064 ZZH RX 255 OP 636: Performed by: INTERNAL MEDICINE

## 2019-08-05 PROCEDURE — 85025 COMPLETE CBC W/AUTO DIFF WBC: CPT | Performed by: INTERNAL MEDICINE

## 2019-08-05 PROCEDURE — 87086 URINE CULTURE/COLONY COUNT: CPT | Performed by: INTERNAL MEDICINE

## 2019-08-05 PROCEDURE — 71045 X-RAY EXAM CHEST 1 VIEW: CPT | Mod: TC

## 2019-08-05 PROCEDURE — 93971 EXTREMITY STUDY: CPT | Mod: TC,LT

## 2019-08-05 PROCEDURE — 87077 CULTURE AEROBIC IDENTIFY: CPT | Performed by: INTERNAL MEDICINE

## 2019-08-05 PROCEDURE — 86140 C-REACTIVE PROTEIN: CPT | Performed by: INTERNAL MEDICINE

## 2019-08-05 PROCEDURE — 96360 HYDRATION IV INFUSION INIT: CPT

## 2019-08-05 RX ORDER — IOPAMIDOL 612 MG/ML
100 INJECTION, SOLUTION INTRAVASCULAR ONCE
Status: COMPLETED | OUTPATIENT
Start: 2019-08-05 | End: 2019-08-05

## 2019-08-05 RX ADMIN — SODIUM CHLORIDE 1000 ML: 9 INJECTION, SOLUTION INTRAVENOUS at 21:35

## 2019-08-05 RX ADMIN — IOPAMIDOL 100 ML: 612 INJECTION, SOLUTION INTRAVENOUS at 23:04

## 2019-08-05 ASSESSMENT — MIFFLIN-ST. JEOR: SCORE: 1693.29

## 2019-08-06 ENCOUNTER — OFFICE VISIT (OUTPATIENT)
Dept: WOUND CARE | Facility: OTHER | Age: 79
DRG: 501 | End: 2019-08-06
Payer: MEDICARE

## 2019-08-06 ENCOUNTER — APPOINTMENT (OUTPATIENT)
Dept: ULTRASOUND IMAGING | Facility: HOSPITAL | Age: 79
DRG: 501 | End: 2019-08-06
Attending: NURSE PRACTITIONER
Payer: MEDICARE

## 2019-08-06 DIAGNOSIS — S31.109D OPEN WOUND OF ABDOMINAL WALL, SUBSEQUENT ENCOUNTER: Primary | ICD-10-CM

## 2019-08-06 PROBLEM — I10 BENIGN ESSENTIAL HYPERTENSION: Status: ACTIVE | Noted: 2019-08-06

## 2019-08-06 PROBLEM — K21.00 GASTROESOPHAGEAL REFLUX DISEASE WITH ESOPHAGITIS: Status: ACTIVE | Noted: 2019-08-06

## 2019-08-06 PROBLEM — S31.109A OPEN ABDOMINAL WALL WOUND: Chronic | Status: ACTIVE | Noted: 2019-08-06

## 2019-08-06 PROBLEM — R78.81 GRAM-POSITIVE BACTEREMIA: Status: ACTIVE | Noted: 2019-08-06

## 2019-08-06 PROBLEM — L02.413 ABSCESS OF RIGHT SHOULDER: Status: ACTIVE | Noted: 2019-08-06

## 2019-08-06 LAB
ALBUMIN SERPL-MCNC: 1.5 G/DL (ref 3.4–5)
ALBUMIN UR-MCNC: 30 MG/DL
ALP SERPL-CCNC: 131 U/L (ref 40–150)
ALT SERPL W P-5'-P-CCNC: 54 U/L (ref 0–70)
ANION GAP SERPL CALCULATED.3IONS-SCNC: 6 MMOL/L (ref 3–14)
APPEARANCE UR: CLEAR
AST SERPL W P-5'-P-CCNC: 32 U/L (ref 0–45)
BACTERIA #/AREA URNS HPF: ABNORMAL /HPF
BASOPHILS # BLD AUTO: 0 10E9/L (ref 0–0.2)
BASOPHILS NFR BLD AUTO: 0.1 %
BILIRUB SERPL-MCNC: 1 MG/DL (ref 0.2–1.3)
BILIRUB UR QL STRIP: NEGATIVE
BUN SERPL-MCNC: 20 MG/DL (ref 7–30)
CALCIUM SERPL-MCNC: 8 MG/DL (ref 8.5–10.1)
CHLORIDE SERPL-SCNC: 104 MMOL/L (ref 94–109)
CO2 SERPL-SCNC: 26 MMOL/L (ref 20–32)
COLOR UR AUTO: YELLOW
CREAT SERPL-MCNC: 0.67 MG/DL (ref 0.66–1.25)
DIFFERENTIAL METHOD BLD: ABNORMAL
EOSINOPHIL # BLD AUTO: 0 10E9/L (ref 0–0.7)
EOSINOPHIL NFR BLD AUTO: 0.1 %
ERYTHROCYTE [DISTWIDTH] IN BLOOD BY AUTOMATED COUNT: 16.9 % (ref 10–15)
GFR SERPL CREATININE-BSD FRML MDRD: >90 ML/MIN/{1.73_M2}
GLUCOSE SERPL-MCNC: 98 MG/DL (ref 70–99)
GLUCOSE UR STRIP-MCNC: NEGATIVE MG/DL
GRAM STN SPEC: ABNORMAL
HCT VFR BLD AUTO: 25.3 % (ref 40–53)
HGB BLD-MCNC: 8.3 G/DL (ref 13.3–17.7)
HGB UR QL STRIP: ABNORMAL
HYALINE CASTS #/AREA URNS LPF: 1 /LPF
IMM GRANULOCYTES # BLD: 0.1 10E9/L (ref 0–0.4)
IMM GRANULOCYTES NFR BLD: 0.7 %
KETONES UR STRIP-MCNC: 10 MG/DL
LACTATE BLD-SCNC: 0.9 MMOL/L (ref 0.7–2)
LEUKOCYTE ESTERASE UR QL STRIP: NEGATIVE
LYMPHOCYTES # BLD AUTO: 0.9 10E9/L (ref 0.8–5.3)
LYMPHOCYTES NFR BLD AUTO: 6.5 %
MCH RBC QN AUTO: 28.1 PG (ref 26.5–33)
MCHC RBC AUTO-ENTMCNC: 32.8 G/DL (ref 31.5–36.5)
MCV RBC AUTO: 86 FL (ref 78–100)
MONOCYTES # BLD AUTO: 0.6 10E9/L (ref 0–1.3)
MONOCYTES NFR BLD AUTO: 4.5 %
MUCOUS THREADS #/AREA URNS LPF: PRESENT /LPF
NEUTROPHILS # BLD AUTO: 11.9 10E9/L (ref 1.6–8.3)
NEUTROPHILS NFR BLD AUTO: 88.1 %
NITRATE UR QL: NEGATIVE
NRBC # BLD AUTO: 0 10*3/UL
NRBC BLD AUTO-RTO: 0 /100
PH UR STRIP: 6.5 PH (ref 4.7–8)
PLATELET # BLD AUTO: 275 10E9/L (ref 150–450)
POTASSIUM SERPL-SCNC: 3.3 MMOL/L (ref 3.4–5.3)
PROT SERPL-MCNC: 5.8 G/DL (ref 6.8–8.8)
RBC # BLD AUTO: 2.95 10E12/L (ref 4.4–5.9)
RBC #/AREA URNS AUTO: 11 /HPF (ref 0–2)
SODIUM SERPL-SCNC: 136 MMOL/L (ref 133–144)
SOURCE: ABNORMAL
SP GR UR STRIP: >1.05 (ref 1–1.03)
SPECIMEN SOURCE: ABNORMAL
SPECIMEN SOURCE: ABNORMAL
UROBILINOGEN UR STRIP-MCNC: 4 MG/DL (ref 0–2)
WBC # BLD AUTO: 13.5 10E9/L (ref 4–11)
WBC #/AREA URNS AUTO: 3 /HPF (ref 0–5)

## 2019-08-06 PROCEDURE — 20606 DRAIN/INJ JOINT/BURSA W/US: CPT | Mod: TC,50

## 2019-08-06 PROCEDURE — 83605 ASSAY OF LACTIC ACID: CPT | Performed by: NURSE PRACTITIONER

## 2019-08-06 PROCEDURE — 99232 SBSQ HOSP IP/OBS MODERATE 35: CPT | Performed by: NURSE PRACTITIONER

## 2019-08-06 PROCEDURE — 25000128 H RX IP 250 OP 636: Performed by: NURSE PRACTITIONER

## 2019-08-06 PROCEDURE — 25000128 H RX IP 250 OP 636

## 2019-08-06 PROCEDURE — 0MB10ZZ EXCISION OF RIGHT SHOULDER BURSA AND LIGAMENT, OPEN APPROACH: ICD-10-PCS | Performed by: ORTHOPAEDIC SURGERY

## 2019-08-06 PROCEDURE — 25000128 H RX IP 250 OP 636: Performed by: INTERNAL MEDICINE

## 2019-08-06 PROCEDURE — 25800030 ZZH RX IP 258 OP 636: Performed by: INTERNAL MEDICINE

## 2019-08-06 PROCEDURE — 25000125 ZZHC RX 250: Performed by: RADIOLOGY

## 2019-08-06 PROCEDURE — 36415 COLL VENOUS BLD VENIPUNCTURE: CPT | Performed by: NURSE PRACTITIONER

## 2019-08-06 PROCEDURE — 85025 COMPLETE CBC W/AUTO DIFF WBC: CPT | Performed by: SURGERY

## 2019-08-06 PROCEDURE — 12000000 ZZH R&B MED SURG/OB

## 2019-08-06 PROCEDURE — 99222 1ST HOSP IP/OBS MODERATE 55: CPT | Mod: AI | Performed by: SURGERY

## 2019-08-06 PROCEDURE — 87205 SMEAR GRAM STAIN: CPT | Performed by: RADIOLOGY

## 2019-08-06 PROCEDURE — 25000132 ZZH RX MED GY IP 250 OP 250 PS 637: Performed by: NURSE PRACTITIONER

## 2019-08-06 PROCEDURE — 80053 COMPREHEN METABOLIC PANEL: CPT | Performed by: SURGERY

## 2019-08-06 PROCEDURE — 36415 COLL VENOUS BLD VENIPUNCTURE: CPT | Performed by: SURGERY

## 2019-08-06 PROCEDURE — 25000128 H RX IP 250 OP 636: Performed by: SURGERY

## 2019-08-06 PROCEDURE — 87186 SC STD MICRODIL/AGAR DIL: CPT | Performed by: RADIOLOGY

## 2019-08-06 PROCEDURE — 87070 CULTURE OTHR SPECIMN AEROBIC: CPT | Performed by: RADIOLOGY

## 2019-08-06 PROCEDURE — 2W03X6Z CHANGE PRESSURE DRESSING ON ABDOMINAL WALL: ICD-10-PCS | Performed by: CLINICAL NURSE SPECIALIST

## 2019-08-06 PROCEDURE — 3E0T3BZ INTRODUCTION OF ANESTHETIC AGENT INTO PERIPHERAL NERVES AND PLEXI, PERCUTANEOUS APPROACH: ICD-10-PCS | Performed by: NURSE ANESTHETIST, CERTIFIED REGISTERED

## 2019-08-06 PROCEDURE — 25800030 ZZH RX IP 258 OP 636: Performed by: SURGERY

## 2019-08-06 PROCEDURE — 87077 CULTURE AEROBIC IDENTIFY: CPT | Performed by: RADIOLOGY

## 2019-08-06 PROCEDURE — 40000786 ZZHCL STATISTIC ACTIVE MRSA SURVEILLANCE CULTURE: Performed by: INTERNAL MEDICINE

## 2019-08-06 RX ORDER — ONDANSETRON 4 MG/1
4 TABLET, ORALLY DISINTEGRATING ORAL EVERY 6 HOURS PRN
Status: DISCONTINUED | OUTPATIENT
Start: 2019-08-06 | End: 2019-08-08 | Stop reason: HOSPADM

## 2019-08-06 RX ORDER — NALOXONE HYDROCHLORIDE 0.4 MG/ML
.1-.4 INJECTION, SOLUTION INTRAMUSCULAR; INTRAVENOUS; SUBCUTANEOUS
Status: DISCONTINUED | OUTPATIENT
Start: 2019-08-06 | End: 2019-08-08 | Stop reason: HOSPADM

## 2019-08-06 RX ORDER — LIDOCAINE HYDROCHLORIDE 10 MG/ML
20 INJECTION, SOLUTION EPIDURAL; INFILTRATION; INTRACAUDAL; PERINEURAL ONCE
Status: COMPLETED | OUTPATIENT
Start: 2019-08-06 | End: 2019-08-06

## 2019-08-06 RX ORDER — ACETAMINOPHEN 325 MG/1
650 TABLET ORAL EVERY 4 HOURS PRN
Status: DISCONTINUED | OUTPATIENT
Start: 2019-08-06 | End: 2019-08-08 | Stop reason: HOSPADM

## 2019-08-06 RX ORDER — ONDANSETRON 2 MG/ML
4 INJECTION INTRAMUSCULAR; INTRAVENOUS EVERY 6 HOURS PRN
Status: DISCONTINUED | OUTPATIENT
Start: 2019-08-06 | End: 2019-08-08 | Stop reason: HOSPADM

## 2019-08-06 RX ORDER — HYDROCODONE BITARTRATE AND ACETAMINOPHEN 5; 325 MG/1; MG/1
1-2 TABLET ORAL EVERY 6 HOURS PRN
Status: DISCONTINUED | OUTPATIENT
Start: 2019-08-06 | End: 2019-08-08 | Stop reason: HOSPADM

## 2019-08-06 RX ORDER — LIDOCAINE 40 MG/G
CREAM TOPICAL
Status: DISCONTINUED | OUTPATIENT
Start: 2019-08-06 | End: 2019-08-08 | Stop reason: HOSPADM

## 2019-08-06 RX ORDER — CEFAZOLIN SODIUM 1 G/50ML
1750 SOLUTION INTRAVENOUS EVERY 12 HOURS
Status: DISCONTINUED | OUTPATIENT
Start: 2019-08-06 | End: 2019-08-08 | Stop reason: HOSPADM

## 2019-08-06 RX ORDER — LIDOCAINE HYDROCHLORIDE 10 MG/ML
INJECTION, SOLUTION EPIDURAL; INFILTRATION; INTRACAUDAL; PERINEURAL
Status: DISCONTINUED
Start: 2019-08-06 | End: 2019-08-07 | Stop reason: HOSPADM

## 2019-08-06 RX ORDER — VANCOMYCIN HYDROCHLORIDE 1 G/20ML
INJECTION, POWDER, LYOPHILIZED, FOR SOLUTION INTRAVENOUS
Status: COMPLETED
Start: 2019-08-06 | End: 2019-08-06

## 2019-08-06 RX ORDER — SACCHAROMYCES BOULARDII 250 MG
250 CAPSULE ORAL 2 TIMES DAILY
Status: DISCONTINUED | OUTPATIENT
Start: 2019-08-06 | End: 2019-08-08 | Stop reason: HOSPADM

## 2019-08-06 RX ORDER — SODIUM CHLORIDE, SODIUM LACTATE, POTASSIUM CHLORIDE, CALCIUM CHLORIDE 600; 310; 30; 20 MG/100ML; MG/100ML; MG/100ML; MG/100ML
INJECTION, SOLUTION INTRAVENOUS CONTINUOUS
Status: DISCONTINUED | OUTPATIENT
Start: 2019-08-06 | End: 2019-08-08 | Stop reason: HOSPADM

## 2019-08-06 RX ADMIN — TAZOBACTAM SODIUM AND PIPERACILLIN SODIUM 3.38 G: 375; 3 INJECTION, SOLUTION INTRAVENOUS at 01:12

## 2019-08-06 RX ADMIN — LIDOCAINE HYDROCHLORIDE 17 ML: 10 INJECTION, SOLUTION EPIDURAL; INFILTRATION; INTRACAUDAL; PERINEURAL at 15:09

## 2019-08-06 RX ADMIN — HYDROCODONE BITARTRATE AND ACETAMINOPHEN 1 TABLET: 5; 325 TABLET ORAL at 18:35

## 2019-08-06 RX ADMIN — SODIUM CHLORIDE, POTASSIUM CHLORIDE, SODIUM LACTATE AND CALCIUM CHLORIDE: 600; 310; 30; 20 INJECTION, SOLUTION INTRAVENOUS at 10:50

## 2019-08-06 RX ADMIN — VANCOMYCIN HYDROCHLORIDE 1500 MG: 1 INJECTION, POWDER, LYOPHILIZED, FOR SOLUTION INTRAVENOUS at 03:27

## 2019-08-06 RX ADMIN — TAZOBACTAM SODIUM AND PIPERACILLIN SODIUM 4.5 G: 500; 4 INJECTION, SOLUTION INTRAVENOUS at 18:37

## 2019-08-06 RX ADMIN — VANCOMYCIN HYDROCHLORIDE 1750 MG: 5 INJECTION, POWDER, LYOPHILIZED, FOR SOLUTION INTRAVENOUS at 16:09

## 2019-08-06 RX ADMIN — TAZOBACTAM SODIUM AND PIPERACILLIN SODIUM 4.5 G: 500; 4 INJECTION, SOLUTION INTRAVENOUS at 10:51

## 2019-08-06 RX ADMIN — RDII 250 MG CAPSULE 250 MG: at 21:34

## 2019-08-06 RX ADMIN — ACETAMINOPHEN 650 MG: 325 TABLET, FILM COATED ORAL at 10:41

## 2019-08-06 RX ADMIN — ENOXAPARIN SODIUM 40 MG: 40 INJECTION SUBCUTANEOUS at 10:41

## 2019-08-06 ASSESSMENT — ENCOUNTER SYMPTOMS
NECK STIFFNESS: 0
CHILLS: 1
DIFFICULTY URINATING: 0
HEADACHES: 0
ARTHRALGIAS: 0
WEAKNESS: 1
FATIGUE: 1
SHORTNESS OF BREATH: 0
COLOR CHANGE: 0
FEVER: 1
CONFUSION: 0
ABDOMINAL PAIN: 0
MYALGIAS: 1
EYE REDNESS: 0

## 2019-08-06 ASSESSMENT — ACTIVITIES OF DAILY LIVING (ADL)
ADLS_ACUITY_SCORE: 18

## 2019-08-06 ASSESSMENT — MIFFLIN-ST. JEOR: SCORE: 1642.5

## 2019-08-06 NOTE — PLAN OF CARE
[facility] Inpatient Admission Note:    Patient admitted to 3224/3224-1 at approximately 0137 via cart accompanied by son from emergency room . Report received from Belinda RN in SBAR format at 0115 via telephone. Patient transferred to bed via slide board.. Patient is alert and oriented X 3, reports pain; rates at 7 on 0-10 scale.  Patient oriented to room, unit, hourly rounding, and plan of care. Explained admission packet and patient handbook with patient bill of rights brochure. Will continue to monitor and document as needed.     Inpatient Nursing criteria listed below was met:    Health care directives status obtained and documented: Yes    Care Everywhere authorization obtained Yes    MRSA swab completed for patient 65 years and older: Yes    Patient identifies a surrogate decision maker: Yes If yes, who:Brian - Son and Renetta Daughter in Law Contact Information:[phone]    Core Measure diagnosis present:No.      If initial lactic acid >2.0, repeat lactic acid drawn within one hour of arrival to unit: NA.   Vaccination assessment and education completed: No     Clergy visit ordered if patient requests: N/A    Skin issues/needs documented: Yes    Isolation Patient: no   Fall Prevention Yes: Care plan updated, education given and documented, sticker and magnet in place: Yes    Care Plan initiated: Yes    Education Documented (including assessment): Yes    Patient has discharge needs : Yes If yes, please explain: Home Care for baths.

## 2019-08-06 NOTE — ED NOTES
Wound vac in place to lower mid abd. Skin without inflammation in area around wound vac. Pt alert/awake and oriented. Visiting with son. Denies pain.

## 2019-08-06 NOTE — ED PROVIDER NOTES
History     Chief Complaint   Patient presents with     Fever     fever of 102, weak. wound vac to abd incision     The history is provided by the patient and a relative.   Fever   Temp source:  Oral  Severity:  Moderate  Duration:  1 day  Timing:  Constant  Chronicity:  New  Associated symptoms: chills and myalgias    Associated symptoms: no chest pain, no confusion, no congestion and no headaches      Ag Perez is a 79 year old male who     Allergies:  Allergies   Allergen Reactions     Crestor [Rosuvastatin]      Rivaroxaban Other (See Comments)     Bleed       Problem List:    Patient Active Problem List    Diagnosis Date Noted     SBO (small bowel obstruction) (H) 05/31/2019     Priority: Medium     Sepsis (H) 05/31/2019     Priority: Medium     Acute abdominal pain 05/31/2019     Priority: Medium     Chronic atrial fibrillation (H) 05/31/2019     Priority: Medium     Small bowel perforation (H) 05/31/2019     Priority: Medium     Cardiac pacemaker in situ 05/31/2019     Priority: Medium     Malignant neoplasm of transverse colon (H) 04/16/2019     Priority: Medium     Advance care planning 08/11/2016     Priority: Medium     Advance Care Planning 8/11/2016: Receipt of ACP document:  Received: Resuscitation Guidelines order which was signed and dated by provider on 6/2/16.  Document previously scanned on 6/3/16.  Order reviewed and found to be valid.  Code Status needs to be updated to reflect choices in most recent ACP document: DNR. Confirmed/documented designated decision maker(s).  Added by Emely Flores   Advance Care Planning Liaison                  Past Medical History:    Past Medical History:   Diagnosis Date     Arrhythmia      Hypertension      Pacemaker        Past Surgical History:    Past Surgical History:   Procedure Laterality Date     CARDIAC SURGERY       COLONOSCOPY       COLONOSCOPY N/A 7/24/2015    Procedure: COLONOSCOPY;  Surgeon: Kameron De Santiago MD;  Location: [facility] OR      "LAPAROTOMY EXPLORATORY N/A 5/31/2019    Procedure: EXPLORATORY LAPAROTOMY WITH RESECTION OF SMALL BOWEL, JEJUNUM, TEMPORARY ABDOMINAL WALL CLOSURE, SEROSAL REPAIR;  Surgeon: Pastor Vilchis MD;  Location: [facility] OR     LAPAROTOMY EXPLORATORY N/A 6/2/2019    Procedure: EXPLORATORY LAPAROTOMY, SMALL BOWEL RESECTION-JEJUNUM, SMALL BOWEL ANASTOMOSIS, ABDOMINAL WASH OUT, FASCIAL CLOSURE;  Surgeon: Pastor Vilchis MD;  Location: [facility] OR       Family History:    No family history on file.    Social History:  Marital Status:   [2]  Social History     Tobacco Use     Smoking status: Former Smoker     Smokeless tobacco: Never Used   Substance Use Topics     Alcohol use: Not on file     Drug use: Not on file        Medications:      No current outpatient medications on file.      Review of Systems   Constitutional: Positive for chills, fatigue and fever.   HENT: Negative for congestion.    Eyes: Negative for redness.   Respiratory: Negative for shortness of breath.    Cardiovascular: Negative for chest pain.   Gastrointestinal: Negative for abdominal pain.   Genitourinary: Negative for difficulty urinating.   Musculoskeletal: Positive for myalgias. Negative for arthralgias and neck stiffness.   Skin: Negative for color change.   Neurological: Positive for weakness. Negative for headaches.   Psychiatric/Behavioral: Negative for confusion.   All other systems reviewed and are negative.      Physical Exam   BP: 108/63  Pulse: 70  Heart Rate: 76  Temp: 98.4  F (36.9  C)  Resp: 24  Height: 175.3 cm (5' 9\")  Weight: 99.8 kg (220 lb)(done at the clinic today)  SpO2: 95 %      Physical Exam   Constitutional: He is oriented to person, place, and time. He appears well-developed and well-nourished. No distress.   HENT:   Head: Normocephalic and atraumatic.   Mouth/Throat: Oropharynx is clear and moist.   Eyes: Pupils are equal, round, and reactive to light. Conjunctivae are normal. No scleral icterus.   Neck: Normal range of " motion. Neck supple. No JVD present. No tracheal deviation present. No thyromegaly present.   Cardiovascular: Normal rate, regular rhythm, normal heart sounds and intact distal pulses. Exam reveals no gallop.   No murmur heard.  Pulmonary/Chest: Effort normal and breath sounds normal. No stridor. No respiratory distress. He has no wheezes. He has no rales. He exhibits no tenderness.   Abdominal: Soft. Bowel sounds are normal. He exhibits no distension and no mass. There is no tenderness. There is no rebound and no guarding.   Drain catheter in place   Musculoskeletal: Normal range of motion. He exhibits no edema or tenderness.   Lymphadenopathy:     He has no cervical adenopathy.   Neurological: He is alert and oriented to person, place, and time.   Skin: Skin is warm. No rash noted. He is not diaphoretic. No erythema. No pallor.   Psychiatric: His behavior is normal.   Nursing note and vitals reviewed.      ED Course        Procedures                 Results for orders placed or performed during the hospital encounter of 08/05/19 (from the past 24 hour(s))   CBC with platelets differential   Result Value Ref Range    WBC 13.0 (H) 4.0 - 11.0 10e9/L    RBC Count 3.10 (L) 4.4 - 5.9 10e12/L    Hemoglobin 8.8 (L) 13.3 - 17.7 g/dL    Hematocrit 26.3 (L) 40.0 - 53.0 %    MCV 85 78 - 100 fl    MCH 28.4 26.5 - 33.0 pg    MCHC 33.5 31.5 - 36.5 g/dL    RDW 16.5 (H) 10.0 - 15.0 %    Platelet Count 295 150 - 450 10e9/L    Diff Method Automated Method     % Neutrophils 92.2 %    % Lymphocytes 3.3 %    % Monocytes 3.7 %    % Eosinophils 0.0 %    % Basophils 0.1 %    % Immature Granulocytes 0.7 %    Nucleated RBCs 0 0 /100    Absolute Neutrophil 12.0 (H) 1.6 - 8.3 10e9/L    Absolute Lymphocytes 0.4 (L) 0.8 - 5.3 10e9/L    Absolute Monocytes 0.5 0.0 - 1.3 10e9/L    Absolute Eosinophils 0.0 0.0 - 0.7 10e9/L    Absolute Basophils 0.0 0.0 - 0.2 10e9/L    Abs Immature Granulocytes 0.1 0 - 0.4 10e9/L    Absolute Nucleated RBC 0.0     Comprehensive metabolic panel   Result Value Ref Range    Sodium 135 133 - 144 mmol/L    Potassium 3.4 3.4 - 5.3 mmol/L    Chloride 102 94 - 109 mmol/L    Carbon Dioxide 26 20 - 32 mmol/L    Anion Gap 7 3 - 14 mmol/L    Glucose 126 (H) 70 - 99 mg/dL    Urea Nitrogen 24 7 - 30 mg/dL    Creatinine 0.81 0.66 - 1.25 mg/dL    GFR Estimate 83 >60 mL/min/[1.73_m2]    GFR Estimate If Black >90 >60 mL/min/[1.73_m2]    Calcium 8.3 (L) 8.5 - 10.1 mg/dL    Bilirubin Total 1.5 (H) 0.2 - 1.3 mg/dL    Albumin 1.6 (L) 3.4 - 5.0 g/dL    Protein Total 6.1 (L) 6.8 - 8.8 g/dL    Alkaline Phosphatase 147 40 - 150 U/L    ALT 67 0 - 70 U/L    AST 44 0 - 45 U/L   Lipase   Result Value Ref Range    Lipase 52 (L) 73 - 393 U/L   Lactic acid whole blood   Result Value Ref Range    Lactic Acid 1.5 0.7 - 2.0 mmol/L   CRP inflammation   Result Value Ref Range    CRP Inflammation 197.0 (H) 0.0 - 8.0 mg/L       Medications   vancomycin (VANCOCIN) 1,500 mg in sodium chloride 0.9 % 500 mL intermittent infusion (has no administration in time range)   0.9% sodium chloride BOLUS (0 mLs Intravenous Stopped 8/5/19 2325)   iopamidol (ISOVUE-300) IV solution 61% 100 mL (100 mLs Intravenous Given 8/5/19 2304)   sodium chloride (PF) 0.9% PF flush 60 mL (60 mLs Intravenous Given 8/5/19 2304)   piperacillin-tazobactam (ZOSYN) infusion 3.375 g (3.375 g Intravenous New Bag 8/6/19 0112)       Assessments & Plan (with Medical Decision Making)   Fever chills, generalized weakness  Had injection of steroid to his R shoulder today by PMD   Sepsis workup was done, CXR no infilatraton  CT chest abd vrad report: 3 cm air fluid collection on R shoulder, small fluid collection subcutaneous area around abdominal cath, small abscess can not be rule out  vanco zosyn given, 1 lit bolus NS  I spoke to Dr Jovel , accepted for admission  I have reviewed the nursing notes.    I have reviewed the findings, diagnosis, plan and need for follow up with the patient.          Medication List      There are no discharge medications for this visit.         Final diagnoses:   Abscess   Fever, unspecified fever cause       8/5/2019   [facility]     Deven Stover MD  08/06/19 0156

## 2019-08-06 NOTE — H&P
[facility]    History and Physical  General Surgery     Date of Admission:  8/5/2019    Assessment & Plan   Ag Perez is a 79 year old male who presents with fever, right shoulder abscess, bacteremia    At this time the patient is admitted and we will plan for incision and drainage of the right shoulder abscess. With his positive cultures he will be started on vancomycin. He is to be kept NPO for possible operative drainage of the abscess. Hospitalist consult will be placed for assistance with his medical comorbidities. All questions and concerns were addressed with adequate time spent answering all concerns.        # Pain Assessment:  Current Pain Score 8/6/2019   Patient currently in pain? denies   Pain score (0-10) -   Pain location -   Pain descriptors -   rFLACC pain score -         Pastor Vilchis      Primary Care Physician   Ary Otto-Kiki    Chief Complaint   Fever and weakness    History is obtained from the patient    History of Present Illness   Ag Perez is a 79 year old male who presents with fever and weakness. The patient was brought into the ER last evening with fevers, chills and myalgias. Upon arrival to the ER his temperature was normal. He felt fine with no chest pain or shortness of breath. Labs and imaging were done while in the ER. He had a leukocytosis of 13k and an elevated CRP. CT scan of his chest/ab/pelvis was performed that showed a right shoulder abscess. The wound vac was left in place and never taken down to evaluate the midline wound. The patient was then admitted with the fever of unknown origin? This morning he says that he is feeling good. He has no chest pain or shortness of breath. He has no pain in his right shoulder. He has no abdominal pain. He has no nausea or emesis. He is having bowel movements with no issues.    Past Medical History    I have reviewed this patient's medical history and updated it with pertinent information if needed.   Past  Medical History:   Diagnosis Date     Arrhythmia      Hypertension      Pacemaker        Past Surgical History   I have reviewed this patient's surgical history and updated it with pertinent information if needed.  Past Surgical History:   Procedure Laterality Date     CARDIAC SURGERY       COLONOSCOPY       COLONOSCOPY N/A 7/24/2015    Procedure: COLONOSCOPY;  Surgeon: Kameron De Santiago MD;  Location: [facility]     LAPAROTOMY EXPLORATORY N/A 5/31/2019    Procedure: EXPLORATORY LAPAROTOMY WITH RESECTION OF SMALL BOWEL, JEJUNUM, TEMPORARY ABDOMINAL WALL CLOSURE, SEROSAL REPAIR;  Surgeon: Pastor Vilchis MD;  Location: [facility]     LAPAROTOMY EXPLORATORY N/A 6/2/2019    Procedure: EXPLORATORY LAPAROTOMY, SMALL BOWEL RESECTION-JEJUNUM, SMALL BOWEL ANASTOMOSIS, ABDOMINAL WASH OUT, FASCIAL CLOSURE;  Surgeon: Pastor Vilchis MD;  Location: [facility]       Prior to Admission Medications   Prior to Admission Medications   Prescriptions Last Dose Informant Patient Reported? Taking?   ASPIRIN PO 8/5/2019 at Unknown time  Yes Yes   Sig: Take 81 mg by mouth daily   BENAZEPRIL HCL PO Unknown at Unknown time  Yes No   Sig: Take 40 mg by mouth daily   Ezetimibe (ZETIA PO) 8/5/2019 at Unknown time  Yes Yes   Sig: Take 10 mg by mouth At Bedtime   LEVOTHYROXINE SODIUM PO 8/5/2019 at Unknown time  Yes Yes   Sig: Take 125 mcg by mouth daily    Multiple Vitamins-Minerals (THERAPEUTIC M) TABS 8/5/2019 at Unknown time  Yes Yes   Sig: Take 1 tablet by mouth daily   Nitroglycerin (NITROSTAT SL) Unknown at Unknown time  Yes No   Sig: Place 0.4 mg under the tongue every 5 minutes as needed for chest pain   Omega-3 Fatty Acids (OMEGA-3 FISH OIL PO) 8/5/2019 at Unknown time  Yes Yes   Sig: Take 1 g by mouth 2 times daily (with meals)    atorvastatin (LIPITOR) 10 MG tablet 8/5/2019 at Unknown time  Yes Yes   Sig: Take 10 mg by mouth every evening   ferrous sulfate (FEROSUL) 325 (65 Fe) MG tablet 8/5/2019 at Unknown time  Yes Yes   Sig: Take 325  mg by mouth daily (with dinner)   tamsulosin (FLOMAX) 0.4 MG capsule 8/5/2019 at Unknown time  Yes Yes   Sig: Take 0.4 mg by mouth every evening      Facility-Administered Medications: None     Allergies   Allergies   Allergen Reactions     Crestor [Rosuvastatin]      Rivaroxaban Other (See Comments)     Bleed       Social History   I have reviewed this patient's social history and updated it with pertinent information if needed. Ag Perez  reports that he has quit smoking. He has never used smokeless tobacco.    Family History   I have reviewed this patient's family history and updated it with pertinent information if needed.   No family history on file.    Review of Systems   ROS as per HPI    Physical Exam   Temp: 100.3  F (37.9  C) Temp src: Tympanic BP: 108/54 Pulse: 82 Heart Rate: 74 Resp: 18 SpO2: 92 % O2 Device: None (Room air)    Vital Signs with Ranges  Temp:  [98  F (36.7  C)-101.4  F (38.6  C)] 100.3  F (37.9  C)  Pulse:  [70-82] 82  Heart Rate:  [74-93] 74  Resp:  [17-24] 18  BP: (104-108)/(53-66) 108/54  SpO2:  [91 %-95 %] 92 %  212 lbs 4.85 oz    Constitutional: awake, alert, cooperative, no apparent distress, and appears stated age  Eyes: lids and lashes normal and pupils equal, round and reactive to light  ENT: normocepalic, without obvious abnormality  Respiratory: No increased work of breathing, good air exchange, clear to auscultation bilaterally, no crackles or wheezing  Cardiovascular: regular rate and rhythm and normal S1 and S2  GI: soft, wound vac removed and underlying tissue pink and granulating, no purulent drainage from inferior tunnel, no surrounding skin erythema, non-tender, non-distended  Skin: no bruising or bleeding, normal skin color, texture, turgor and no redness, warmth, or swelling  Musculoskeletal: bilateral lower extremity pitting edema  Neurologic: Awake, alert, oriented to name, place and time.     Data   Results for orders placed or performed during the hospital  encounter of 08/05/19 (from the past 24 hour(s))   CBC with platelets differential   Result Value Ref [place]    WBC 13.0 (H) 4.0 - 11.0 10e9/L    RBC Count 3.10 (L) 4.4 - 5.9 10e12/L    Hemoglobin 8.8 (L) 13.3 - 17.7 g/dL    Hematocrit 26.3 (L) 40.0 - 53.0 %    MCV 85 78 - 100 fl    MCH 28.4 26.5 - 33.0 pg    MCHC 33.5 31.5 - 36.5 g/dL    RDW 16.5 (H) 10.0 - 15.0 %    Platelet Count 295 150 - 450 10e9/L    Diff Method Automated Method     % Neutrophils 92.2 %    % Lymphocytes 3.3 %    % Monocytes 3.7 %    % Eosinophils 0.0 %    % Basophils 0.1 %    % Immature Granulocytes 0.7 %    Nucleated RBCs 0 0 /100    Absolute Neutrophil 12.0 (H) 1.6 - 8.3 10e9/L    Absolute Lymphocytes 0.4 (L) 0.8 - 5.3 10e9/L    Absolute Monocytes 0.5 0.0 - 1.3 10e9/L    Absolute Eosinophils 0.0 0.0 - 0.7 10e9/L    Absolute Basophils 0.0 0.0 - 0.2 10e9/L    Abs Immature Granulocytes 0.1 0 - 0.4 10e9/L    Absolute Nucleated RBC 0.0    Comprehensive metabolic panel   Result Value Ref [place]    Sodium 135 133 - 144 mmol/L    Potassium 3.4 3.4 - 5.3 mmol/L    Chloride 102 94 - 109 mmol/L    Carbon Dioxide 26 20 - 32 mmol/L    Anion Gap 7 3 - 14 mmol/L    Glucose 126 (H) 70 - 99 mg/dL    Urea Nitrogen 24 7 - 30 mg/dL    Creatinine 0.81 0.66 - 1.25 mg/dL    GFR Estimate 83 >60 mL/min/[1.73_m2]    GFR Estimate If Black >90 >60 mL/min/[1.73_m2]    Calcium 8.3 (L) 8.5 - 10.1 mg/dL    Bilirubin Total 1.5 (H) 0.2 - 1.3 mg/dL    Albumin 1.6 (L) 3.4 - 5.0 g/dL    Protein Total 6.1 (L) 6.8 - 8.8 g/dL    Alkaline Phosphatase 147 40 - 150 U/L    ALT 67 0 - 70 U/L    AST 44 0 - 45 U/L   Lipase   Result Value Ref [place]    Lipase 52 (L) 73 - 393 U/L   Lactic acid whole blood   Result Value Ref [place]    Lactic Acid 1.5 0.7 - 2.0 mmol/L   Blood culture   Result Value Ref [place]    Specimen Description Blood Right Arm     Culture Micro (A)      1 of 2 bottles  Gram positive cocci in clusters  Identification and susceptibility to follow.      Culture Micro        Critical Value called to and read back by  Dorothy Phelps 0935 8/6/19 by Kamila Bhagat     CRP inflammation   Result Value Ref [place]    CRP Inflammation 197.0 (H) 0.0 - 8.0 mg/L   Blood culture   Result Value Ref [place]    Specimen Description Blood Right Arm     Culture Micro (A)      1 of 2 bottles  Gram positive cocci in clusters  Identification to follow.      Culture Micro       Critical Value called to and read back by  Lyle Phelps 0935 8/6/19 by Kamila Bhagat     XR Chest Port 1 View    Narrative    Procedure:XR CHEST PORT 1 VW    Clinical history:Male, 79 years, fever    Technique: Single view was obtained.    Comparison: 6/2/2019    Findings: The cardiac silhouette is normal. The pulmonary vasculature  is normal.    The lungs demonstrate linear scarring versus atelectasis at the left  lung base, otherwise are clear. Bony structures are unremarkable.      Impression    Impression:   Left basilar atelectasis versus scarring. No evidence of focal  pneumonia.    RUSSEL CURIEL MD   CT Chest/Abdomen/Pelvis w Contrast    Narrative    CT CHEST/ABDOMEN/PELVIS W CONTRAST    HISTORY: 79 yearsMale with sepsis    TECHNIQUE: Axial CT imaging of the chest abdomen and pelvis was  performed with contrast. Coronal and sagittal reconstructions were  obtained.    COMPARISON: CT abdomen pelvis of 5/30/2019    FINDINGS:    CT CHEST: There is an abnormal fluid collection within the anterior  right shoulder, along the anterior surface of the deltoid and upper  margin of the pectoralis. A small amount of air is seen within this  collection of fluid. This collection of fluid measures 2.2 x 2.7 x 3.2  cm. There is also fluid in the right glenohumeral joint space and  subdeltoid subacromial bursa.  There is no mediastinal or hilar or axillary lymphadenopathy.    The lungs are clear. No consolidating airspace opacities are present.  No concerning pulmonary nodules or masses are present         No concerning osseous lesions are  identified    IMPRESSION CHEST: Abnormal collection of fluid anterior to the right  shoulder. Consider abscess. If a recent injection was performed, this  may be sequela of that. There is also a sizable glenohumeral and  subdeltoid subacromial bursal effusion.  CT ABDOMEN AND PELVIS:    Liver: There is normal enhancement of the hepatic parenchyma.    Gallbladder: Surgically absent.    Spleen: Unremarkable    Pancreas: Unremarkable    Adrenals: Unremarkable    Kidneys: Small bilateral renal cysts are present. There is no  hydronephrosis.    Bowel: No abnormally distended or thickened loops of bowel present.  There is a surgical midline laparotomy wound. There has been interval  repair of an anterior abdominal wall hernia as seen on the prior  study.    Lymph nodes: There is no evidence of mass or lymphadenopathy.    PELVIS: There is no evidence of mass lymphadenopathy or abnormal fluid  collection.    There is bilateral spondylolysis of L5 with grade 1 to grade 2  anterolisthesis of L5 in relation S1. There is osteopenia and  multilevel degenerative change of the lumbar spine.    IMPRESSION ABDOMEN AND PELVIS: Interval surgical repair of anterior  bowel wall hernia as seen on the prior study. There are no acute  changes otherwise.    STACIE RAMIREZ MD   Active MRSA Surveillance Culture   Result Value Ref [place]    Specimen Description Swab     Culture Micro Culture in progress    Comprehensive metabolic panel   Result Value Ref [place]    Sodium 136 133 - 144 mmol/L    Potassium 3.3 (L) 3.4 - 5.3 mmol/L    Chloride 104 94 - 109 mmol/L    Carbon Dioxide 26 20 - 32 mmol/L    Anion Gap 6 3 - 14 mmol/L    Glucose 98 70 - 99 mg/dL    Urea Nitrogen 20 7 - 30 mg/dL    Creatinine 0.67 0.66 - 1.25 mg/dL    GFR Estimate >90 >60 mL/min/[1.73_m2]    GFR Estimate If Black >90 >60 mL/min/[1.73_m2]    Calcium 8.0 (L) 8.5 - 10.1 mg/dL    Bilirubin Total PENDING 0.2 - 1.3 mg/dL    Albumin PENDING 3.4 - 5.0 g/dL    Protein Total  PENDING 6.8 - 8.8 g/dL    Alkaline Phosphatase PENDING 40 - 150 U/L    ALT PENDING 0 - 70 U/L    AST PENDING 0 - 45 U/L   CBC with platelets differential   Result Value Ref [place]    WBC 13.5 (H) 4.0 - 11.0 10e9/L    RBC Count 2.95 (L) 4.4 - 5.9 10e12/L    Hemoglobin 8.3 (L) 13.3 - 17.7 g/dL    Hematocrit 25.3 (L) 40.0 - 53.0 %    MCV 86 78 - 100 fl    MCH 28.1 26.5 - 33.0 pg    MCHC 32.8 31.5 - 36.5 g/dL    RDW 16.9 (H) 10.0 - 15.0 %    Platelet Count 275 150 - 450 10e9/L    Diff Method Automated Method     % Neutrophils 88.1 %    % Lymphocytes 6.5 %    % Monocytes 4.5 %    % Eosinophils 0.1 %    % Basophils 0.1 %    % Immature Granulocytes 0.7 %    Nucleated RBCs 0 0 /100    Absolute Neutrophil 11.9 (H) 1.6 - 8.3 10e9/L    Absolute Lymphocytes 0.9 0.8 - 5.3 10e9/L    Absolute Monocytes 0.6 0.0 - 1.3 10e9/L    Absolute Eosinophils 0.0 0.0 - 0.7 10e9/L    Absolute Basophils 0.0 0.0 - 0.2 10e9/L    Abs Immature Granulocytes 0.1 0 - 0.4 10e9/L    Absolute Nucleated RBC 0.0

## 2019-08-06 NOTE — DISCHARGE INSTRUCTIONS
What to expect when you have contrast    During your exam, we will inject  contrast  into your vein or artery. (Contrast is a clear liquid with iodine in it. It shows up on X-rays.)    You may feel warm or hot. You may have a metal taste in your mouth and a slight upset stomach. You may also feel pressure near the kidneys and bladder. These effects will last about 1 to 3 minutes.    Please tell us if you have:    Sneezing     Itching    Hives     Swelling in the face    A hoarse voice    Breathing problems    Other new symptoms    Serious problems are rare.  They may include:    Irregular heartbeat     Seizures    Kidney failure              Tissue damage    Shock      Death    If you have any problems during the exam, we  will treat them right away.    When you get home    Call your hospital if you have any new symptoms in the next 2 days, like hives or swelling. (Phone numbers are at the bottom of this page.) Or call your family doctor.     If you have wheezing or trouble breathing, call 911.    Self-care  -Drink at least 4 extra glasses of water today.   This reduces the stress on your kidneys.  -Keep taking your regular medicines.    The contrast will pass out of your body in your  Urine(pee). This will happen in the next 24 hours. You  will not feel this. Your urine will not  change color.    If you have kidney problems or take metformin    Drink 4 to 8 large glasses of water for the next  2 days, if you are not on a fluid restriction.    ?If you take metformin (Glucophage or Glucovance) for diabetes, keep taking it.      ?Your kidney function tests are abnormal.  If you take Metformin, do not take it for 48 hours. Please go to your clinic for a blood test within 3 days after your exam before the restarting this medicine.     (Note to provider:please give patient prescription for lab tests.)    ?Special instructions: Drink an extra 4 glasses of water to flush out the contrast.     I have read and understand the  above information.    Patient Sign Here:______________________________________Date:________Time:______    Staff Sign Here:________________________________________Date:_______Time:______      Radiology Departments:     ?[facility]: [phone] ?[place]: [phone]     ?[place]: [phone] ?[facility]:[phone]      ?[place]: [phone]  ?[place]: [phone]  ?[place]:[phone]    ?[facility]:[phone]  ?[facility]:[phone]

## 2019-08-06 NOTE — PLAN OF CARE
Took over care of this pt at approx 1000 a.m.  Pt A&O, forgetful. VSS, max T 100.1, Tyl given as preventative.  Wound vac removed this a.m.,  new dressing placed to abdomen. No c/o pain.  + bld cultures - Vanco & Zosyn for antbtx.  LR infusing at 100 mls/hr.  NPO since this a.m, went to DI for aspiration of L and R shoulders.  Pt needs to be TQ2H.  Pt went down for procedure at 1410.  NP called this afternoon, wants wound care called to come put wound vac back on.  Wound care called by evening Chg Nurse.  Face to face report given with opportunity to observe patient.    Report given to Luli Barraza   8/6/2019  3:10 PM

## 2019-08-06 NOTE — PLAN OF CARE
"/54   Pulse 82   Temp 100.3  F (37.9  C) (Tympanic)   Resp 18   Ht 1.727 m (5' 8\")   Wt 96.3 kg (212 lb 4.9 oz)   SpO2 92%   BMI 32.28 kg/m      Lungs clear and equal bilaterally, alert and oriented, +3 pitting edema to left side arm and leg.  Wound vac clean without signs of infection to wound, draining clear serosanguinous fluid, left side groin and scrotal area excoriation with redness.  Dr Jovel notified to request diet order, notify of fever and edema. NPO at this time. Will continue to monitor.   "

## 2019-08-06 NOTE — PROGRESS NOTES
Assessment completed see flowsheet.    LOC: alert and mostly oriented, though was confused about the date and thought he's been here 2-3 days.  Others present: Patient alone    Dx: abscess R shoulder  Chronic Disease Management: HTN, ch A Fib    Lives with: alone  Living at:  Home in [facility])  Transportation: YES He normally drives; home care nurse has been encouraging him to begin using [employer]; he can also get a ride with his son at times     Primary PCP: Ary Montanez  Insurance:  Medicare and [employer]  Medicare IM letter not yet reviewed.    Support System:  His son and dtr in law Renetta; his wife  about 4 years ago and Brian is his only child  Homecare/PCA: [employer] goes to the home on  and  for wound care  /County Services:   none  [place]: NO Though he was in the [facility] Referral line called: mario    Health Care Directive: NO but he was receptive of the information  Guardian: no  POA: no; but he would like more information on this for future planning    Pharmacy: [facility]  Meds management: YES he has been managing his meds; no known concerns    Adequate Resources for needs (housing, utilities, food/med): YES  Household chores: independent  Work/community/social activity: YES generally leaves only for his appointments and errands such as getting groceries    ADLs: independent; though [facility] nurse says she had to help him get his arms into his sleeves yesterday  Ambulation:independent with a walker  Falls: says he fell of the couch yesterday when he leaned forward to pet the cat  Nutrition: independent with shopping and meal prep  Sleep: sleeps well in a bed at night    Equipment used: wound vac      Oxygen supplier: na      Does the supplier have valid oxygen orders: na    Mental health: though he complains of feeling agitated when he has pain, he denies any further mental health concerns. During our visit he was confused  "as to the number of days he's been here; [facility] nurse says he has \"memory issues\" and describes that his forgetfulness is generally time related but that he follows a calendar with good success and has so far been able to keep his appointments. She says he recently had an episode of losing his checkbook during which she stayed with him because he was \"so rattled\". He frequently repetitively asks the same questions, sometimes he remembers that he's done that.   Substance abuse: no use  Exposure to violence/abuse: denies  Stressors: none additional    Able to Return to Prior Living Arrangements: YES    Choice of Vendor: na    Barriers: none known    GENE: average    Plan: he plans to return home via his son Brian      "

## 2019-08-06 NOTE — PLAN OF CARE
VSS, afebrile, HRR, lungs clear, bowels active, pt has a wound vac to his mid abdomen, seal intact. Pt left groin is excoriated, inter dry placed there, and right groin has a bruise. Pt coccyx and buttocks are red blanchable barrier cream applied. Pt needed to be cleaned up in the [place] area as he was very odorous, pt did admit to having difficulties keeping himself clean, since he is unable to shower with the wound vac. Pt left arm is swollen 3+ edema, CMS is intact. Lower legs are 2+, CMS intact. Pt is alert and oriented x 4, makes his needs known and calls appropriately.     Face to face report given with opportunity to observe patient.    Report given to PARISA De La Cruz   8/6/2019  8:35 AM

## 2019-08-06 NOTE — CONSULTS
"S:  Asked to see Ag for reapplication of his wound vac.  It was removed upon admission by Dr. Vilchis.     O:  /53   Pulse 82   Temp 97.9  F (36.6  C) (Temporal)   Resp 18   Ht 1.727 m (5' 8\")   Wt 96.3 kg (212 lb 4.9 oz)   SpO2 96%   BMI 32.28 kg/m    Gen: Alert, no apparent distress  Abd: Soft,  Non-tender, non-distended  Wound:  Flooded wound with Dakins, wiped with gauze, small amount of creamy drainage noted at the distal end of wound. Magaly wound skin protected with skin protectant wipes and duoderm CGF.  Inserted one white foam wrapped with oil emulsion gauze to undermined area and wound base, covered with one black foam.  Draped wound.  Confirmed seal check and vac settings (125 mm/hg, low, continuous).     A/P  Wound vac dressing change three times weekly.    Sylvie Estrada CNS          "

## 2019-08-06 NOTE — H&P (VIEW-ONLY)
[facility]    Hospitalist Progress Note    Date of Service (when I saw the patient): 08/06/2019    Assessment & Plan       Abscess of right shoulder: CT chest shows abscess anterior shoulder looks to communicating with shoulder joint. He had a steroid injection done in clinic 7/6. There is small amount of air in the abscess. He reports relief after injection but 2 days later increased shoulder pain and has had progressive widespread joint pain since then with decreased motion in the right shoulder. Additionally he complains of widepsread large joint pain and progressive malaise and weakness. Consult Dr. Reeves who would like shoulder aspirated, if ash pus or positive gram stain will need washout. He does not have any signs of sepsis, lactic acid negative x2, mild fever only. Vanco and Zosyn.     Fluid collection left axilla: Patient noted left arm edema and presented with same to his PCP. An US was ordered and completed 8/5. That showed a large complex fluid collection 3x10x3. He has bilateral shoulder pain with left arm edema and generalized pain. Since he will likely need right shoulder washout will get this fluid collection aspirated as well and if it is purulent will plan on debriding that at the same time.       Gram-positive bacteremia:4/4 bottles.  Presented with sequela of infection. Initially considering open abdominal wound there was concern for abdominal infection. CT scan ofchest/abd was done and cleared abdomen. Wound vac removed by Dr. Vilchis and assessed and looks clean. He is not having any abdominal symptoms.       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 He did have partial colectomy and decided against pursuing chemotherapy and is on surveillance every 3 months with Dr. Burrell.      Open abdominal wall wound: Open since small bowel obstruction and subsequent perforation with septic shock. His wound is stable, no abdominal pain.       Benign essential hypertension:  Stable right now, will hold off on home Benazepril with acute severe infection.      Chronic atrial fibrillation (H): Intolerant of full anticoagulation, he has a pacemaker and when last here was 100% paced. HR is regular on auscultation.      Dementia: During his last hospitalization in 5/2019 his PCP stated he had some mild chronic dementia. During his stay this got acutely worse and required seroquel for symptom management. His mentation cleared and he was titrated off this after discharge. Today he is clear but forgetful though alert and oriented x3.       DVT Prophylaxis: Pneumatic Compression Devices  Code Status: DNR    Disposition: Expected discharge in several days once infection improved, joint clear.    Kenisha BROCK Rolle, CNP    Interval History   Feels tired, aching joints. Does not want any pain medications but does agree to tylenol. He denies chest pain, shortness of breath or abdominal pain. Rests unless aroused.     -Data reviewed today: I reviewed all new labs and imaging results over the last 24 hours.     Physical Exam   Temp: 97.9  F (36.6  C) Temp src: Temporal BP: 123/53 Pulse: 82 Heart Rate: 86 Resp: 18 SpO2: 96 % O2 Device: None (Room air)    Vitals:    08/05/19 2007 08/06/19 0154   Weight: 99.8 kg (220 lb) 96.3 kg (212 lb 4.9 oz)     Vital Signs with Ranges  Temp:  [97.9  F (36.6  C)-101.4  F (38.6  C)] 97.9  F (36.6  C)  Pulse:  [70-82] 82  Heart Rate:  [74-93] 86  Resp:  [17-24] 18  BP: (104-123)/(53-66) 123/53  SpO2:  [91 %-96 %] 96 %  I/O last 3 completed shifts:  In: 1893 [P.O.:30; I.V.:1863]  Out: 625 [Urine:625]    Peripheral IV 08/06/19 Right Upper forearm (Active)   Site Assessment WDL 8/6/2019 12:00 PM   Line Status Infusing;Checked every 1-2 hour 8/6/2019 12:00 PM   Phlebitis Scale 0-->no symptoms 8/6/2019 12:00 PM   Infiltration Scale 0 8/6/2019 12:00 PM   Number of days: 0       Negative Pressure Wound Therapy Abdomen Mid (Active)   Number of days: 62       Pressure Injury  08/06/19 Bilateral;Posterior Coccyx Red blanchable (Active)   Wound Base Erythema, blanchable 8/6/2019 11:00 AM   Magaly-wound Assessment Blanchable erythema 8/6/2019  2:04 AM   Drainage Amount None 8/6/2019 11:00 AM   Wound Care/Cleansing Barrier applied  8/6/2019 11:00 AM   Dressing Open to air 8/6/2019 11:00 AM   Number of days: 0       Incision/Surgical Site 06/02/19 Abdomen (Active)   Incision Assessment UTV;Other (Comment) 8/6/2019  7:50 AM   Magaly-Incision Assessment UTV;Other (Comment) 8/6/2019  7:50 AM   Closure Other (Comment) 8/6/2019  7:50 AM   Incision Drainage Amount Scant 8/6/2019  7:50 AM   Drainage Description Serosanguinous 8/6/2019  7:50 AM   Incision Care Therapy - negative pressure 8/6/2019  7:50 AM   Dressing Intervention Clean, dry, intact 8/6/2019  7:50 AM   Number of days: 65     Line/device assessment(s) completed for medical necessity    Constitutional: Awake,alert, ill appearing.   Respiratory: Clear bilaterally, no wheezes, crackles or rhonchi.  Cardiovascular: HRR, no murmurs, rubs, thrills.   GI: Obese, soft, nontender, bandages covering midline wound..  Skin/Integumen: Pale, no unusual bruising, open areas or rashes.   Other: No large palpable mass on the right anterior chest/shoulder. He does have pain with movement of the right shoulder. The left arm/shoulder is actually causing him more pain. He has significant 2+ pitting edema to left upper and lower arm.     Medications     lactated ringers 100 mL/hr at 08/06/19 1050       lidocaine (PF)         lidocaine (PF)         piperacillin-tazobactam  4.5 g Intravenous Q6H     sodium chloride (PF)  3 mL Intracatheter Q8H     vancomycin (VANCOCIN) IV  1,750 mg Intravenous Q12H       Data   Recent Labs   Lab 08/06/19  0947 08/05/19  2115   WBC 13.5* 13.0*   HGB 8.3* 8.8*   MCV 86 85    295    135   POTASSIUM 3.3* 3.4   CHLORIDE 104 102   CO2 26 26   BUN 20 24   CR 0.67 0.81   ANIONGAP 6 7   ERIKA 8.0* 8.3*   GLC 98 126*   ALBUMIN  1.5* 1.6*   PROTTOTAL 5.8* 6.1*   BILITOTAL 1.0 1.5*   ALKPHOS 131 147   ALT 54 67   AST 32 44   LIPASE  --  52*       Recent Results (from the past 24 hour(s))   US Upper Extremity Venous Duplex Left    Narrative    US UPPER EXTREMITY VENOUS DUPLEX LEFT  8/5/2019 3:35 PM    History:Male, age 79 years Left arm swelling; Other specified soft  tissue disorders    Comparison: None.    Technique: Grayscale and color Doppler ultrasound of the left upper  extremity venous structures.    Findings:   The deep venous structures are patent and fully compressible. There is  normal augmentation of flow.     There is a large heterogeneously hypoechogenic structure seen in the  left axilla measuring approximately 9 cm and 10 cm in length by 3 cm  in both AP and transverse dimensions that demonstrates little to no  blood flow suggests the presence of a complex fluid collection versus  hypoechoic hypovascular mass.      Impression    Impression:  1.  No evidence of DVT.     2.  Limited evaluation suggests a 10 cm x 3 cm x 3 cm complex fluid  collection or perhaps hypovascular soft tissue mass in the left  axilla.    RUSSEL CURIEL MD   XR Chest Port 1 View    Narrative    Procedure:XR CHEST PORT 1 VW    Clinical history:Male, 79 years, fever    Technique: Single view was obtained.    Comparison: 6/2/2019    Findings: The cardiac silhouette is normal. The pulmonary vasculature  is normal.    The lungs demonstrate linear scarring versus atelectasis at the left  lung base, otherwise are clear. Bony structures are unremarkable.      Impression    Impression:   Left basilar atelectasis versus scarring. No evidence of focal  pneumonia.    RUSSEL CURIEL MD   CT Chest/Abdomen/Pelvis w Contrast    Narrative    CT CHEST/ABDOMEN/PELVIS W CONTRAST    HISTORY: 79 yearsMale with sepsis    TECHNIQUE: Axial CT imaging of the chest abdomen and pelvis was  performed with contrast. Coronal and sagittal reconstructions  were  obtained.    COMPARISON: CT abdomen pelvis of 5/30/2019    FINDINGS:    CT CHEST: There is an abnormal fluid collection within the anterior  right shoulder, along the anterior surface of the deltoid and upper  margin of the pectoralis. A small amount of air is seen within this  collection of fluid. This collection of fluid measures 2.2 x 2.7 x 3.2  cm. There is also fluid in the right glenohumeral joint space and  subdeltoid subacromial bursa.  There is no mediastinal or hilar or axillary lymphadenopathy.    The lungs are clear. No consolidating airspace opacities are present.  No concerning pulmonary nodules or masses are present         No concerning osseous lesions are identified    IMPRESSION CHEST: Abnormal collection of fluid anterior to the right  shoulder. Consider abscess. If a recent injection was performed, this  may be sequela of that. There is also a sizable glenohumeral and  subdeltoid subacromial bursal effusion.  CT ABDOMEN AND PELVIS:    Liver: There is normal enhancement of the hepatic parenchyma.    Gallbladder: Surgically absent.    Spleen: Unremarkable    Pancreas: Unremarkable    Adrenals: Unremarkable    Kidneys: Small bilateral renal cysts are present. There is no  hydronephrosis.    Bowel: No abnormally distended or thickened loops of bowel present.  There is a surgical midline laparotomy wound. There has been interval  repair of an anterior abdominal wall hernia as seen on the prior  study.    Lymph nodes: There is no evidence of mass or lymphadenopathy.    PELVIS: There is no evidence of mass lymphadenopathy or abnormal fluid  collection.    There is bilateral spondylolysis of L5 with grade 1 to grade 2  anterolisthesis of L5 in relation S1. There is osteopenia and  multilevel degenerative change of the lumbar spine.    IMPRESSION ABDOMEN AND PELVIS: Interval surgical repair of anterior  bowel wall hernia as seen on the prior study. There are no acute  changes otherwise.    STACIE  MD JAMES   US Joint Injection Aspiration Intermediate Bilateral    Narrative    Exam:US JOINT INJECTION ASPIRATION INTERMEDIATE BILATERAL.    History:79 years Male large complex fluid collection    Comparison:  CT scan chest abdomen pelvis 8/5/2019 and ultrasound of  the left upper extremity 8/5/2019.    Technique: After informed consent was obtained, a timeout was  performed, satisfactorily identifying the patient and the site of the  procedure.    Ultrasound evaluation was performed, demonstrating complex fluid  collection of the right shoulder and another complex fluid collection  in the region of the left axilla .    The patient was then placed in the supine  position and prepped and  draped in usual sterile fashion. 4 mL of 1% lidocaine was then  instilled. An 18-gauge spinal needle was advanced under fluoroscopic  guidance into the distended shoulder joint and adjacent fluid  collections. A total of 26 mL of purulent material was obtained and  sent for analysis.    The patient's left shoulder was then prepped and draped in usual  sterile fashion. A a 22-gauge spinal needle  was then positioned using  ultrasound guidance. Frankly purulent material was obtained from the  complex fluid collection in the left axilla. A total of 16 mL was  obtained in this location.    Specimens were prepared and sent for analysis.           Impression    Impression:  1.  Technically successful ultrasound guided  aspiration of complex  fluid collection of the right shoulder and another complex fluid  collection in the left axilla .    2.  26 mL of frankly purulent material was obtained from the right  shoulder and 16 mL of purulent material was obtained from the left  axillary fluid collection.    3.  No acute complication. Follow-up is pending.    RUSSEL CURIEL MD

## 2019-08-06 NOTE — PHARMACY
[facility]    Pharmacy      Antimicrobial Stewardship Note     Current antimicrobial therapy:  Anti-infectives (From now, onward)    Start     Dose/Rate Route Frequency Ordered Stop    08/06/19 1530  vancomycin (VANCOCIN) 1,750 mg in sodium chloride 0.9 % 500 mL intermittent infusion      1,750 mg  284 mL/hr over 2 Hours Intravenous EVERY 12 HOURS 08/06/19 1043      08/06/19 1015  piperacillin-tazobactam (ZOSYN) intermittent infusion 4.5 g      4.5 g  200 mL/hr over 30 Minutes Intravenous EVERY 6 HOURS 08/06/19 1009          Indication: Abscess; Bacteremia     Days of Therapy: Day 1      Pertinent labs:  Creatinine   Creatinine   Date Value Ref Range Status   08/06/2019 0.67 0.66 - 1.25 mg/dL Final   08/05/2019 0.81 0.66 - 1.25 mg/dL Final   06/08/2019 1.03 0.66 - 1.25 mg/dL Final     WBC   WBC   Date Value Ref Range Status   08/06/2019 13.5 (H) 4.0 - 11.0 10e9/L Final   08/05/2019 13.0 (H) 4.0 - 11.0 10e9/L Final   07/05/2019 11.7 (H) 4.0 - 11.0 10e9/L Final     Procalcitonin   Procalcitonin   Date Value Ref Range Status   07/05/2019 0.11 ng/ml Final     Comment:     0.05-0.24 ng/ml Low risk of systemic bacterial infection. Local bacterial   infection possible.  Recommendation: Assess other clinical features of   infection. Discourage antibiotics unless strong clinical suspicion for serious   infection.     06/07/2019 2.92 ng/ml Final     Comment:     2.00-9.99 ng/ml  High risk for progression to severe sepsis.  Recommendation: Strongly recommend initiating or continuing antibiotics.   Evaluate culture results and clinical features to target antibacterial   therapy. Obtain blood cultures and other relevant cultures if not done. Repeat   PCT in 2 days to guide antibiotic de-escalation. Consider de-escalating   antibiotics when PCT concentration is <80% of peak or abs PCT <0.5.     06/03/2019 22.13 (HH) ng/ml Final     Comment:     Critical Value called to and read back by  HOLLY BERRY AT 0552 BY  TF  >/= 10.00 ng/ml   Very high likelihood of severe sepsis or septic shock.  Recommendation: Strongly recommend initiating or continuing antibiotics.   Evaluate culture results and clinical features to target antibacterial   therapy. Obtain blood cultures and other relevant cultures if not done.Repeat   PCT in 2 days to guide antibiotic de-escalation. Consider de-escalating   antibiotics when PCT concentration is <80% of peak or abs PCT <1.       CRP   CRP Inflammation   Date Value Ref Range Status   08/05/2019 197.0 (H) 0.0 - 8.0 mg/L Final   06/07/2019 139.0 (H) 0.0 - 8.0 mg/L Final   06/04/2019 116.0 (H) 0.0 - 8.0 mg/L Final     Culture Results:  7-Day Micro Results     Procedure Component Value Units Date/Time   Active MRSA Surveillance Culture [[id number]] Collected: 08/06/19 0335   Order Status: Completed Lab Status: Preliminary result Updated: 08/06/19 0651   Specimen: Swab from Nose     Specimen Description Swab    Culture Micro Culture in progress   Blood culture [[id number]] (Abnormal) Collected: 08/05/19 2120   Order Status: Completed Lab Status: Preliminary result Updated: 08/06/19 1140   Specimen: Blood     Specimen Description Blood Right Arm    Culture Micro 2 of 2 bottles   Gram positive cocci in clusters   Identification to follow.   Abnormal      Critical Value called to and read back by   Lyle Phelps 0935 8/6/19 by Kamila Bhagat    Blood culture [[id number]] (Abnormal) Collected: 08/05/19 2115   Order Status: Completed Lab Status: Preliminary result Updated: 08/06/19 1140   Specimen: Blood     Specimen Description Blood Right Arm    Culture Micro 2 of 2 bottles   Gram positive cocci in clusters   Identification and susceptibility to follow.   Abnormal      Critical Value called to and read back by   Dorothy Phelps 0935 8/6/19 by Kamila Bhagat    Urine Culture Aerobic Bacterial [[id number]] Collected: 08/05/19 0335   Order Status: Completed Lab Status: Preliminary result Updated: 08/06/19 0711   Specimen:  Midstream Urine     Specimen Description Midstream Urine    Culture Micro Culture in progress      Recommendations/Interventions:  1. No recommendations at this time, will continue to monitor.     Guevara Teague  August 6, 2019

## 2019-08-06 NOTE — ED NOTES
Patient states he was in to the clinic today to have a an ultrasound as he's had some swelling in his left hand and arm. States when he was done running around and he got home he was sitting on the edge of the bed when he bent over to pet the cat and toppled down. States he just wasn't able to get up. He does walk with a walker and states he doesn't feel any weaker or anything he just couldn't get up. Per EMS he had a fever but temp was normal on arrival. Pt is alert and orientated. States he feels just fine and how he usually feels.

## 2019-08-06 NOTE — PROGRESS NOTES
[facility]    Hospitalist Progress Note    Date of Service (when I saw the patient): 08/06/2019    Assessment & Plan       Abscess of right shoulder: CT chest shows abscess anterior shoulder looks to communicating with shoulder joint. He had a steroid injection done in clinic 7/6. There is small amount of air in the abscess. He reports relief after injection but 2 days later increased shoulder pain and has had progressive widespread joint pain since then with decreased motion in the right shoulder. Additionally he complains of widepsread large joint pain and progressive malaise and weakness. Consult Dr. Reeves who would like shoulder aspirated, if ash pus or positive gram stain will need washout. He does not have any signs of sepsis, lactic acid negative x2, mild fever only. Vanco and Zosyn.     Fluid collection left axilla: Patient noted left arm edema and presented with same to his PCP. An US was ordered and completed 8/5. That showed a large complex fluid collection 3x10x3. He has bilateral shoulder pain with left arm edema and generalized pain. Since he will likely need right shoulder washout will get this fluid collection aspirated as well and if it is purulent will plan on debriding that at the same time.       Gram-positive bacteremia:4/4 bottles.  Presented with sequela of infection. Initially considering open abdominal wound there was concern for abdominal infection. CT scan ofchest/abd was done and cleared abdomen. Wound vac removed by Dr. Vilchis and assessed and looks clean. He is not having any abdominal symptoms.       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 He did have partial colectomy and decided against pursuing chemotherapy and is on surveillance every 3 months with Dr. Burrell.      Open abdominal wall wound: Open since small bowel obstruction and subsequent perforation with septic shock. His wound is stable, no abdominal pain.       Benign essential hypertension:  Stable right now, will hold off on home Benazepril with acute severe infection.      Chronic atrial fibrillation (H): Intolerant of full anticoagulation, he has a pacemaker and when last here was 100% paced. HR is regular on auscultation.      Dementia: During his last hospitalization in 5/2019 his PCP stated he had some mild chronic dementia. During his stay this got acutely worse and required seroquel for symptom management. His mentation cleared and he was titrated off this after discharge. Today he is clear but forgetful though alert and oriented x3.       DVT Prophylaxis: Pneumatic Compression Devices  Code Status: DNR    Disposition: Expected discharge in several days once infection improved, joint clear.    Kenisha BROCK Rolle, CNP    Interval History   Feels tired, aching joints. Does not want any pain medications but does agree to tylenol. He denies chest pain, shortness of breath or abdominal pain. Rests unless aroused.     -Data reviewed today: I reviewed all new labs and imaging results over the last 24 hours.     Physical Exam   Temp: 97.9  F (36.6  C) Temp src: Temporal BP: 123/53 Pulse: 82 Heart Rate: 86 Resp: 18 SpO2: 96 % O2 Device: None (Room air)    Vitals:    08/05/19 2007 08/06/19 0154   Weight: 99.8 kg (220 lb) 96.3 kg (212 lb 4.9 oz)     Vital Signs with Ranges  Temp:  [97.9  F (36.6  C)-101.4  F (38.6  C)] 97.9  F (36.6  C)  Pulse:  [70-82] 82  Heart Rate:  [74-93] 86  Resp:  [17-24] 18  BP: (104-123)/(53-66) 123/53  SpO2:  [91 %-96 %] 96 %  I/O last 3 completed shifts:  In: 1893 [P.O.:30; I.V.:1863]  Out: 625 [Urine:625]    Peripheral IV 08/06/19 Right Upper forearm (Active)   Site Assessment WDL 8/6/2019 12:00 PM   Line Status Infusing;Checked every 1-2 hour 8/6/2019 12:00 PM   Phlebitis Scale 0-->no symptoms 8/6/2019 12:00 PM   Infiltration Scale 0 8/6/2019 12:00 PM   Number of days: 0       Negative Pressure Wound Therapy Abdomen Mid (Active)   Number of days: 62       Pressure Injury  08/06/19 Bilateral;Posterior Coccyx Red blanchable (Active)   Wound Base Erythema, blanchable 8/6/2019 11:00 AM   Magaly-wound Assessment Blanchable erythema 8/6/2019  2:04 AM   Drainage Amount None 8/6/2019 11:00 AM   Wound Care/Cleansing Barrier applied  8/6/2019 11:00 AM   Dressing Open to air 8/6/2019 11:00 AM   Number of days: 0       Incision/Surgical Site 06/02/19 Abdomen (Active)   Incision Assessment UTV;Other (Comment) 8/6/2019  7:50 AM   Magaly-Incision Assessment UTV;Other (Comment) 8/6/2019  7:50 AM   Closure Other (Comment) 8/6/2019  7:50 AM   Incision Drainage Amount Scant 8/6/2019  7:50 AM   Drainage Description Serosanguinous 8/6/2019  7:50 AM   Incision Care Therapy - negative pressure 8/6/2019  7:50 AM   Dressing Intervention Clean, dry, intact 8/6/2019  7:50 AM   Number of days: 65     Line/device assessment(s) completed for medical necessity    Constitutional: Awake,alert, ill appearing.   Respiratory: Clear bilaterally, no wheezes, crackles or rhonchi.  Cardiovascular: HRR, no murmurs, rubs, thrills.   GI: Obese, soft, nontender, bandages covering midline wound..  Skin/Integumen: Pale, no unusual bruising, open areas or rashes.   Other: No large palpable mass on the right anterior chest/shoulder. He does have pain with movement of the right shoulder. The left arm/shoulder is actually causing him more pain. He has significant 2+ pitting edema to left upper and lower arm.     Medications     lactated ringers 100 mL/hr at 08/06/19 1050       lidocaine (PF)         lidocaine (PF)         piperacillin-tazobactam  4.5 g Intravenous Q6H     sodium chloride (PF)  3 mL Intracatheter Q8H     vancomycin (VANCOCIN) IV  1,750 mg Intravenous Q12H       Data   Recent Labs   Lab 08/06/19  0947 08/05/19  2115   WBC 13.5* 13.0*   HGB 8.3* 8.8*   MCV 86 85    295    135   POTASSIUM 3.3* 3.4   CHLORIDE 104 102   CO2 26 26   BUN 20 24   CR 0.67 0.81   ANIONGAP 6 7   ERIKA 8.0* 8.3*   GLC 98 126*   ALBUMIN  1.5* 1.6*   PROTTOTAL 5.8* 6.1*   BILITOTAL 1.0 1.5*   ALKPHOS 131 147   ALT 54 67   AST 32 44   LIPASE  --  52*       Recent Results (from the past 24 hour(s))   US Upper Extremity Venous Duplex Left    Narrative    US UPPER EXTREMITY VENOUS DUPLEX LEFT  8/5/2019 3:35 PM    History:Male, age 79 years Left arm swelling; Other specified soft  tissue disorders    Comparison: None.    Technique: Grayscale and color Doppler ultrasound of the left upper  extremity venous structures.    Findings:   The deep venous structures are patent and fully compressible. There is  normal augmentation of flow.     There is a large heterogeneously hypoechogenic structure seen in the  left axilla measuring approximately 9 cm and 10 cm in length by 3 cm  in both AP and transverse dimensions that demonstrates little to no  blood flow suggests the presence of a complex fluid collection versus  hypoechoic hypovascular mass.      Impression    Impression:  1.  No evidence of DVT.     2.  Limited evaluation suggests a 10 cm x 3 cm x 3 cm complex fluid  collection or perhaps hypovascular soft tissue mass in the left  axilla.    RUSSEL CURIEL MD   XR Chest Port 1 View    Narrative    Procedure:XR CHEST PORT 1 VW    Clinical history:Male, 79 years, fever    Technique: Single view was obtained.    Comparison: 6/2/2019    Findings: The cardiac silhouette is normal. The pulmonary vasculature  is normal.    The lungs demonstrate linear scarring versus atelectasis at the left  lung base, otherwise are clear. Bony structures are unremarkable.      Impression    Impression:   Left basilar atelectasis versus scarring. No evidence of focal  pneumonia.    RUSSEL CURIEL MD   CT Chest/Abdomen/Pelvis w Contrast    Narrative    CT CHEST/ABDOMEN/PELVIS W CONTRAST    HISTORY: 79 yearsMale with sepsis    TECHNIQUE: Axial CT imaging of the chest abdomen and pelvis was  performed with contrast. Coronal and sagittal reconstructions  were  obtained.    COMPARISON: CT abdomen pelvis of 5/30/2019    FINDINGS:    CT CHEST: There is an abnormal fluid collection within the anterior  right shoulder, along the anterior surface of the deltoid and upper  margin of the pectoralis. A small amount of air is seen within this  collection of fluid. This collection of fluid measures 2.2 x 2.7 x 3.2  cm. There is also fluid in the right glenohumeral joint space and  subdeltoid subacromial bursa.  There is no mediastinal or hilar or axillary lymphadenopathy.    The lungs are clear. No consolidating airspace opacities are present.  No concerning pulmonary nodules or masses are present         No concerning osseous lesions are identified    IMPRESSION CHEST: Abnormal collection of fluid anterior to the right  shoulder. Consider abscess. If a recent injection was performed, this  may be sequela of that. There is also a sizable glenohumeral and  subdeltoid subacromial bursal effusion.  CT ABDOMEN AND PELVIS:    Liver: There is normal enhancement of the hepatic parenchyma.    Gallbladder: Surgically absent.    Spleen: Unremarkable    Pancreas: Unremarkable    Adrenals: Unremarkable    Kidneys: Small bilateral renal cysts are present. There is no  hydronephrosis.    Bowel: No abnormally distended or thickened loops of bowel present.  There is a surgical midline laparotomy wound. There has been interval  repair of an anterior abdominal wall hernia as seen on the prior  study.    Lymph nodes: There is no evidence of mass or lymphadenopathy.    PELVIS: There is no evidence of mass lymphadenopathy or abnormal fluid  collection.    There is bilateral spondylolysis of L5 with grade 1 to grade 2  anterolisthesis of L5 in relation S1. There is osteopenia and  multilevel degenerative change of the lumbar spine.    IMPRESSION ABDOMEN AND PELVIS: Interval surgical repair of anterior  bowel wall hernia as seen on the prior study. There are no acute  changes otherwise.    STACIE  MD JAMES   US Joint Injection Aspiration Intermediate Bilateral    Narrative    Exam:US JOINT INJECTION ASPIRATION INTERMEDIATE BILATERAL.    History:79 years Male large complex fluid collection    Comparison:  CT scan chest abdomen pelvis 8/5/2019 and ultrasound of  the left upper extremity 8/5/2019.    Technique: After informed consent was obtained, a timeout was  performed, satisfactorily identifying the patient and the site of the  procedure.    Ultrasound evaluation was performed, demonstrating complex fluid  collection of the right shoulder and another complex fluid collection  in the region of the left axilla .    The patient was then placed in the supine  position and prepped and  draped in usual sterile fashion. 4 mL of 1% lidocaine was then  instilled. An 18-gauge spinal needle was advanced under fluoroscopic  guidance into the distended shoulder joint and adjacent fluid  collections. A total of 26 mL of purulent material was obtained and  sent for analysis.    The patient's left shoulder was then prepped and draped in usual  sterile fashion. A a 22-gauge spinal needle  was then positioned using  ultrasound guidance. Frankly purulent material was obtained from the  complex fluid collection in the left axilla. A total of 16 mL was  obtained in this location.    Specimens were prepared and sent for analysis.           Impression    Impression:  1.  Technically successful ultrasound guided  aspiration of complex  fluid collection of the right shoulder and another complex fluid  collection in the left axilla .    2.  26 mL of frankly purulent material was obtained from the right  shoulder and 16 mL of purulent material was obtained from the left  axillary fluid collection.    3.  No acute complication. Follow-up is pending.    RUSSEL CURIEL MD

## 2019-08-06 NOTE — PLAN OF CARE
Face to face report given with opportunity to observe patient.    Report given to Natalia Dixon   8/6/2019  10:00 AM

## 2019-08-06 NOTE — PHARMACY-VANCOMYCIN DOSING SERVICE
Pharmacy Vancomycin Initial Note  Date of Service 2019  Patient's  1940  79 year old, male    Indication: Abscess and Bacteremia    Current estimated CrCl = Estimated Creatinine Clearance: 97.4 mL/min (based on SCr of 0.67 mg/dL).    Creatinine for last 3 days  2019:  9:15 PM Creatinine 0.81 mg/dL  2019:  9:47 AM Creatinine 0.67 mg/dL    Recent Vancomycin Level(s) for last 3 days  No results found for requested labs within last 72 hours.      Vancomycin IV Administrations (past 72 hours)                   vancomycin (VANCOCIN) 100 mg/mL injection (mg) 1,500 mg New Bag 19 0327                Nephrotoxins and other renal medications (From now, onward)    Start     Dose/Rate Route Frequency Ordered Stop    19 1530  vancomycin (VANCOCIN) 1,750 mg in sodium chloride 0.9 % 500 mL intermittent infusion      1,750 mg  284 mL/hr over 2 Hours Intravenous EVERY 12 HOURS 19 1043      19 1015  piperacillin-tazobactam (ZOSYN) intermittent infusion 4.5 g      4.5 g  200 mL/hr over 30 Minutes Intravenous EVERY 6 HOURS 19 1009            Contrast Orders - past 72 hours (72h ago, onward)    Start     Dose/Rate Route Frequency Ordered Stop    19 2226  iopamidol (ISOVUE-300) IV solution 61% 100 mL      100 mL Intravenous ONCE 19 2225 19 2304           Plan:  1. Start vancomycin 1750 mg IV q12h.   2. Goal Trough Level: 15-20 mg/L   3. Pharmacy will check trough levels as appropriate in 1-3 Days.    4. Serum creatinine levels will be ordered daily for the first week of therapy and at least twice weekly for subsequent weeks.    5. Animoca method utilized to dose vancomycin therapy: online pharmacokinetic vancomycin dosing calculator    Alisha Whitney PharmD  2019

## 2019-08-06 NOTE — ED TRIAGE NOTES
Pt presents from home after he bent over to pet the cat and tipped over. Pt was unable to get up. Family called EMS. EMS reports pt had a fever of 102. Pt's temp now is 98.4 orally. Pt has hx of 4months ago having surgery for what was reported a perforated intestine. Wound vac in place to lower mid abd incision. Wound care nurse was to his home this am. Pt was last at the clinic on last Friday.

## 2019-08-07 ENCOUNTER — ANESTHESIA EVENT (OUTPATIENT)
Dept: SURGERY | Facility: HOSPITAL | Age: 79
DRG: 501 | End: 2019-08-07
Payer: MEDICARE

## 2019-08-07 ENCOUNTER — HOSPITAL ENCOUNTER (INPATIENT)
Dept: CARDIOLOGY | Facility: HOSPITAL | Age: 79
DRG: 501 | End: 2019-08-07
Attending: NURSE PRACTITIONER
Payer: MEDICARE

## 2019-08-07 ENCOUNTER — APPOINTMENT (OUTPATIENT)
Dept: GENERAL RADIOLOGY | Facility: HOSPITAL | Age: 79
DRG: 501 | End: 2019-08-07
Attending: NURSE PRACTITIONER
Payer: MEDICARE

## 2019-08-07 ENCOUNTER — ANESTHESIA (OUTPATIENT)
Dept: SURGERY | Facility: HOSPITAL | Age: 79
DRG: 501 | End: 2019-08-07
Payer: MEDICARE

## 2019-08-07 LAB
ANION GAP SERPL CALCULATED.3IONS-SCNC: 6 MMOL/L (ref 3–14)
BACTERIA SPEC CULT: NORMAL
BASOPHILS # BLD AUTO: 0 10E9/L (ref 0–0.2)
BASOPHILS NFR BLD AUTO: 0.1 %
BUN SERPL-MCNC: 24 MG/DL (ref 7–30)
CALCIUM SERPL-MCNC: 8.1 MG/DL (ref 8.5–10.1)
CHLORIDE SERPL-SCNC: 103 MMOL/L (ref 94–109)
CO2 SERPL-SCNC: 26 MMOL/L (ref 20–32)
CREAT SERPL-MCNC: 0.76 MG/DL (ref 0.66–1.25)
CRP SERPL-MCNC: 226 MG/L (ref 0–8)
DIFFERENTIAL METHOD BLD: ABNORMAL
EOSINOPHIL # BLD AUTO: 0.1 10E9/L (ref 0–0.7)
EOSINOPHIL NFR BLD AUTO: 0.4 %
ERYTHROCYTE [DISTWIDTH] IN BLOOD BY AUTOMATED COUNT: 17 % (ref 10–15)
GFR SERPL CREATININE-BSD FRML MDRD: 85 ML/MIN/{1.73_M2}
GLUCOSE SERPL-MCNC: 113 MG/DL (ref 70–99)
HCT VFR BLD AUTO: 27.8 % (ref 40–53)
HGB BLD-MCNC: 9.1 G/DL (ref 13.3–17.7)
IMM GRANULOCYTES # BLD: 0.1 10E9/L (ref 0–0.4)
IMM GRANULOCYTES NFR BLD: 0.8 %
LACTATE BLD-SCNC: 1.4 MMOL/L (ref 0.7–2)
LYMPHOCYTES # BLD AUTO: 1 10E9/L (ref 0.8–5.3)
LYMPHOCYTES NFR BLD AUTO: 7.1 %
MCH RBC QN AUTO: 28.4 PG (ref 26.5–33)
MCHC RBC AUTO-ENTMCNC: 32.7 G/DL (ref 31.5–36.5)
MCV RBC AUTO: 87 FL (ref 78–100)
MONOCYTES # BLD AUTO: 0.6 10E9/L (ref 0–1.3)
MONOCYTES NFR BLD AUTO: 4.4 %
NEUTROPHILS # BLD AUTO: 12.6 10E9/L (ref 1.6–8.3)
NEUTROPHILS NFR BLD AUTO: 87.2 %
NRBC # BLD AUTO: 0 10*3/UL
NRBC BLD AUTO-RTO: 0 /100
PLATELET # BLD AUTO: 318 10E9/L (ref 150–450)
POTASSIUM SERPL-SCNC: 3.6 MMOL/L (ref 3.4–5.3)
RBC # BLD AUTO: 3.2 10E12/L (ref 4.4–5.9)
SODIUM SERPL-SCNC: 135 MMOL/L (ref 133–144)
SPECIMEN SOURCE: NORMAL
VANCOMYCIN SERPL-MCNC: 18.4 MG/L
WBC # BLD AUTO: 14.4 10E9/L (ref 4–11)

## 2019-08-07 PROCEDURE — 25000128 H RX IP 250 OP 636: Performed by: ORTHOPAEDIC SURGERY

## 2019-08-07 PROCEDURE — 71000014 ZZH RECOVERY PHASE 1 LEVEL 2 FIRST HR: Performed by: ORTHOPAEDIC SURGERY

## 2019-08-07 PROCEDURE — 25000125 ZZHC RX 250: Performed by: NURSE ANESTHETIST, CERTIFIED REGISTERED

## 2019-08-07 PROCEDURE — 36415 COLL VENOUS BLD VENIPUNCTURE: CPT | Performed by: NURSE PRACTITIONER

## 2019-08-07 PROCEDURE — 27110028 ZZH OR GENERAL SUPPLY NON-STERILE: Performed by: ORTHOPAEDIC SURGERY

## 2019-08-07 PROCEDURE — 36415 COLL VENOUS BLD VENIPUNCTURE: CPT | Performed by: SURGERY

## 2019-08-07 PROCEDURE — 80202 ASSAY OF VANCOMYCIN: CPT | Performed by: NURSE PRACTITIONER

## 2019-08-07 PROCEDURE — 80048 BASIC METABOLIC PNL TOTAL CA: CPT | Performed by: SURGERY

## 2019-08-07 PROCEDURE — 73560 X-RAY EXAM OF KNEE 1 OR 2: CPT | Mod: TC,LT

## 2019-08-07 PROCEDURE — 25800030 ZZH RX IP 258 OP 636: Performed by: SURGERY

## 2019-08-07 PROCEDURE — 40000306 ZZH STATISTIC PRE PROC ASSESS II: Performed by: ORTHOPAEDIC SURGERY

## 2019-08-07 PROCEDURE — 99100 ANES PT EXTEME AGE<1 YR&>70: CPT | Performed by: NURSE ANESTHETIST, CERTIFIED REGISTERED

## 2019-08-07 PROCEDURE — 25000566 ZZH SEVOFLURANE, EA 15 MIN: Performed by: ANESTHESIOLOGY

## 2019-08-07 PROCEDURE — 36000052 ZZH SURGERY LEVEL 2 EA 15 ADDTL MIN: Performed by: ORTHOPAEDIC SURGERY

## 2019-08-07 PROCEDURE — 12000000 ZZH R&B MED SURG/OB

## 2019-08-07 PROCEDURE — 25800030 ZZH RX IP 258 OP 636: Performed by: ORTHOPAEDIC SURGERY

## 2019-08-07 PROCEDURE — 25000128 H RX IP 250 OP 636: Performed by: NURSE ANESTHETIST, CERTIFIED REGISTERED

## 2019-08-07 PROCEDURE — 36000050 ZZH SURGERY LEVEL 2 1ST 30 MIN: Performed by: ORTHOPAEDIC SURGERY

## 2019-08-07 PROCEDURE — 37000009 ZZH ANESTHESIA TECHNICAL FEE, EACH ADDTL 15 MIN: Performed by: ORTHOPAEDIC SURGERY

## 2019-08-07 PROCEDURE — 10061 I&D ABSCESS COMP/MULTIPLE: CPT | Performed by: NURSE ANESTHETIST, CERTIFIED REGISTERED

## 2019-08-07 PROCEDURE — 93306 TTE W/DOPPLER COMPLETE: CPT | Mod: 26 | Performed by: INTERNAL MEDICINE

## 2019-08-07 PROCEDURE — 99232 SBSQ HOSP IP/OBS MODERATE 35: CPT | Performed by: NURSE PRACTITIONER

## 2019-08-07 PROCEDURE — 25000132 ZZH RX MED GY IP 250 OP 250 PS 637: Performed by: ORTHOPAEDIC SURGERY

## 2019-08-07 PROCEDURE — 27210794 ZZH OR GENERAL SUPPLY STERILE: Performed by: ORTHOPAEDIC SURGERY

## 2019-08-07 PROCEDURE — 25000128 H RX IP 250 OP 636: Performed by: NURSE PRACTITIONER

## 2019-08-07 PROCEDURE — 93306 TTE W/DOPPLER COMPLETE: CPT | Mod: TC

## 2019-08-07 PROCEDURE — 85025 COMPLETE CBC W/AUTO DIFF WBC: CPT | Performed by: SURGERY

## 2019-08-07 PROCEDURE — 99140 ANES COMP EMERGENCY COND: CPT | Performed by: NURSE ANESTHETIST, CERTIFIED REGISTERED

## 2019-08-07 PROCEDURE — 10061 I&D ABSCESS COMP/MULTIPLE: CPT | Performed by: ANESTHESIOLOGY

## 2019-08-07 PROCEDURE — 25000128 H RX IP 250 OP 636: Performed by: SURGERY

## 2019-08-07 PROCEDURE — 83605 ASSAY OF LACTIC ACID: CPT | Performed by: NURSE PRACTITIONER

## 2019-08-07 PROCEDURE — 86140 C-REACTIVE PROTEIN: CPT | Performed by: SURGERY

## 2019-08-07 PROCEDURE — 37000008 ZZH ANESTHESIA TECHNICAL FEE, 1ST 30 MIN: Performed by: ORTHOPAEDIC SURGERY

## 2019-08-07 RX ORDER — FENTANYL CITRATE 50 UG/ML
INJECTION, SOLUTION INTRAMUSCULAR; INTRAVENOUS PRN
Status: DISCONTINUED | OUTPATIENT
Start: 2019-08-07 | End: 2019-08-07

## 2019-08-07 RX ORDER — ROPIVACAINE HYDROCHLORIDE 5 MG/ML
INJECTION, SOLUTION EPIDURAL; INFILTRATION; PERINEURAL PRN
Status: DISCONTINUED | OUTPATIENT
Start: 2019-08-07 | End: 2019-08-07

## 2019-08-07 RX ORDER — PROPOFOL 10 MG/ML
INJECTION, EMULSION INTRAVENOUS PRN
Status: DISCONTINUED | OUTPATIENT
Start: 2019-08-07 | End: 2019-08-07

## 2019-08-07 RX ORDER — ONDANSETRON 2 MG/ML
INJECTION INTRAMUSCULAR; INTRAVENOUS PRN
Status: DISCONTINUED | OUTPATIENT
Start: 2019-08-07 | End: 2019-08-07

## 2019-08-07 RX ORDER — LIDOCAINE HYDROCHLORIDE 20 MG/ML
INJECTION, SOLUTION INFILTRATION; PERINEURAL PRN
Status: DISCONTINUED | OUTPATIENT
Start: 2019-08-07 | End: 2019-08-07

## 2019-08-07 RX ADMIN — TAZOBACTAM SODIUM AND PIPERACILLIN SODIUM 4.5 G: 500; 4 INJECTION, SOLUTION INTRAVENOUS at 00:39

## 2019-08-07 RX ADMIN — SODIUM CHLORIDE, POTASSIUM CHLORIDE, SODIUM LACTATE AND CALCIUM CHLORIDE: 600; 310; 30; 20 INJECTION, SOLUTION INTRAVENOUS at 07:25

## 2019-08-07 RX ADMIN — VANCOMYCIN HYDROCHLORIDE 1750 MG: 5 INJECTION, POWDER, LYOPHILIZED, FOR SOLUTION INTRAVENOUS at 15:55

## 2019-08-07 RX ADMIN — ROPIVACAINE HYDROCHLORIDE 20 ML: 5 INJECTION, SOLUTION EPIDURAL; INFILTRATION; PERINEURAL at 07:27

## 2019-08-07 RX ADMIN — LIDOCAINE HYDROCHLORIDE 40 MG: 20 INJECTION, SOLUTION INFILTRATION; PERINEURAL at 07:48

## 2019-08-07 RX ADMIN — TAZOBACTAM SODIUM AND PIPERACILLIN SODIUM 4.5 G: 500; 4 INJECTION, SOLUTION INTRAVENOUS at 12:11

## 2019-08-07 RX ADMIN — FENTANYL CITRATE 50 MCG: 50 INJECTION, SOLUTION INTRAMUSCULAR; INTRAVENOUS at 07:48

## 2019-08-07 RX ADMIN — SODIUM CHLORIDE, POTASSIUM CHLORIDE, SODIUM LACTATE AND CALCIUM CHLORIDE: 600; 310; 30; 20 INJECTION, SOLUTION INTRAVENOUS at 00:39

## 2019-08-07 RX ADMIN — VANCOMYCIN HYDROCHLORIDE 1750 MG: 5 INJECTION, POWDER, LYOPHILIZED, FOR SOLUTION INTRAVENOUS at 03:22

## 2019-08-07 RX ADMIN — FENTANYL CITRATE 50 MCG: 50 INJECTION, SOLUTION INTRAMUSCULAR; INTRAVENOUS at 08:11

## 2019-08-07 RX ADMIN — TAZOBACTAM SODIUM AND PIPERACILLIN SODIUM 4.5 G: 500; 4 INJECTION, SOLUTION INTRAVENOUS at 06:51

## 2019-08-07 RX ADMIN — ONDANSETRON 4 MG: 2 INJECTION INTRAMUSCULAR; INTRAVENOUS at 08:28

## 2019-08-07 RX ADMIN — HYDROCODONE BITARTRATE AND ACETAMINOPHEN 1 TABLET: 5; 325 TABLET ORAL at 19:53

## 2019-08-07 RX ADMIN — RDII 250 MG CAPSULE 250 MG: at 21:09

## 2019-08-07 RX ADMIN — TAZOBACTAM SODIUM AND PIPERACILLIN SODIUM 4.5 G: 500; 4 INJECTION, SOLUTION INTRAVENOUS at 18:46

## 2019-08-07 RX ADMIN — PROPOFOL 200 MG: 10 INJECTION, EMULSION INTRAVENOUS at 07:48

## 2019-08-07 ASSESSMENT — ENCOUNTER SYMPTOMS: DYSRHYTHMIAS: 1

## 2019-08-07 ASSESSMENT — LIFESTYLE VARIABLES: TOBACCO_USE: 1

## 2019-08-07 ASSESSMENT — ACTIVITIES OF DAILY LIVING (ADL)
ADLS_ACUITY_SCORE: 20
ADLS_ACUITY_SCORE: 18
ADLS_ACUITY_SCORE: 20

## 2019-08-07 NOTE — PROGRESS NOTES
CHRISTI BAPTISTE A  Patient visit during  rounds. Patient alert and orientated. Patient seemed somewhat emotional about his medical condition. He noted that he is Religious but has not attended in some years. We closed our time together in prayer.

## 2019-08-07 NOTE — PROGRESS NOTES
Procedure: bilateral upper extremity fluid aspirations, bilateral    There were  no complications and patient has no symptoms..      Tolerated procedure well.    Radiologist:Dr. Spear    Time Out: Prior to the start of the procedure and with procedural staff participation, I verbally confirmed the patient s identity using two indicators, relevant allergies, that the procedure was appropriate and matched the consent or emergent situation, and that the correct equipment/implants were available. Immediately prior to starting the procedure I conducted the Time Out with the procedural staff and re-confirmed the patient s name, procedure, and site/side. (The Joint Commission universal protocol was followed.)  Yes    Position:  supine    Pain:  8    Sedation:None. Local Anesthestic used  No sedation    Estimated Blood Loss: None     Condition: Stable    Comments: See dictated procedure note for full details     Conchis Johnson RN

## 2019-08-07 NOTE — CONSULTS
Consult Date:  08/06/2019      IDENTIFYING INFORMATION:  I was consulted by General Surgery and Internal Medicine.      CHIEF COMPLAINT:  Right shoulder infection.      HISTORY OF PRESENT ILLNESS:  The patient is an 79-year-old male with multiple medical problems.  He was admitted for bacteremia.  He had some recent abdominal surgery.  He has right shoulder pain and problems with range of motion.  He had a CT scan done that showed a fluid collection anterior to his glenohumeral joint with some extensive subdeltoid bursa.  It was aspirated yesterday, had gram-positive cocci in clusters.  Presumed to be a glenohumeral infection or subdeltoid infection.  Also, a small abscess formation is identified on the CT scan and ultrasound.  I was consulted to perform an I and D.  He has not had fevers overnight.  Otherwise, medically stable at baseline for him.      ALLERGIES:  CRESTOR     MEDICATIONS:  Please see medical record.      FAMILY HISTORY:  Noncontributory.      PAST MEDICAL HISTORY:  Atrial fibrillation with a pacemaker, hypertension, recent small bowel resection with subsequent hernia repair, diabetes mellitus.      SOCIAL HISTORY:  The patient lives locally in [place].   He does not smoke, does not drink alcohol.      REVIEW OF SYSTEMS:  Negative, except the HPI.      PHYSICAL EXAMINATION:   VITAL SIGNS:  Temperature 97.4, pulse 82, heart rate 76, respirations are 20, blood pressure 142/74.  He is satting 94% on room air.   GENERAL:  The patient is alert and oriented x 3, in no acute distress.  He is cooperative and pleasant.   SKIN:  He had previous aspiration attempts to his right shoulder.  There is no excessive warmth or erythema of the right shoulder.     NEUROLOGIC:  Intact motor and sensory in the median, radial, ulnar and axillary nerve distribution.   PULMONARY:  Breathing is nonlabored.  No audible wheezes.   CARDIOVASCULAR:  2+ radial pulse.   ABDOMEN:  Mildly obese, nontender.  Wound VAC in place.       IMAGING:  Reviewed CT scan from prior.  He does have an abscess anterior to his shoulder that appears to communicate with the subdeltoid space and probably does involve the glenohumeral joint and also some advanced glenohumeral arthritis.      LABORATORY STUDIES:  His CRP is 226.  White blood cell count 14.4, hemoglobin 9.1, Gram stain from the shoulder shows gram-positive cocci in clusters.      ASSESSMENT:  Right shoulder subdeltoid abscess, likely communicating with the glenohumeral joint.      PLAN:  At this point, I consented him for an open I and D to his right shoulder with evacuation of the abscess. We will open the glenohumeral joint through the rotator interval.  Risks and benefits were discussed.  Consent was obtained.  He was cleared medically.  Anticipate postoperative antibiotics.  Range of motion of the shoulder as tolerated.         CHRISTIANO DOBSON MD             D: 2019   T: 2019   MT: DIOR      Name:     CHRISTI BAPTISTE   MRN:      [id number]        Account:       [id number]   :      1940           Consult Date:  2019      Document: [id number]

## 2019-08-07 NOTE — PHARMACY
[facility]    Pharmacy      Antimicrobial Stewardship Note     Current antimicrobial therapy:  Anti-infectives (From now, onward)    Start     Dose/Rate Route Frequency Ordered Stop    08/06/19 1530  vancomycin (VANCOCIN) 1,750 mg in sodium chloride 0.9 % 500 mL intermittent infusion      1,750 mg  284 mL/hr over 2 Hours Intravenous EVERY 12 HOURS 08/06/19 1043      08/06/19 1015  piperacillin-tazobactam (ZOSYN) intermittent infusion 4.5 g      4.5 g  200 mL/hr over 30 Minutes Intravenous EVERY 6 HOURS 08/06/19 1009          Indication: Abscess; Bacteremia    Days of Therapy: Day 2      Pertinent labs:  Creatinine   Creatinine   Date Value Ref Range Status   08/07/2019 0.76 0.66 - 1.25 mg/dL Final   08/06/2019 0.67 0.66 - 1.25 mg/dL Final   08/05/2019 0.81 0.66 - 1.25 mg/dL Final     WBC   WBC   Date Value Ref Range Status   08/07/2019 14.4 (H) 4.0 - 11.0 10e9/L Final   08/06/2019 13.5 (H) 4.0 - 11.0 10e9/L Final   08/05/2019 13.0 (H) 4.0 - 11.0 10e9/L Final     Procalcitonin   Procalcitonin   Date Value Ref Range Status   07/05/2019 0.11 ng/ml Final     Comment:     0.05-0.24 ng/ml Low risk of systemic bacterial infection. Local bacterial   infection possible.  Recommendation: Assess other clinical features of   infection. Discourage antibiotics unless strong clinical suspicion for serious   infection.     06/07/2019 2.92 ng/ml Final     Comment:     2.00-9.99 ng/ml  High risk for progression to severe sepsis.  Recommendation: Strongly recommend initiating or continuing antibiotics.   Evaluate culture results and clinical features to target antibacterial   therapy. Obtain blood cultures and other relevant cultures if not done. Repeat   PCT in 2 days to guide antibiotic de-escalation. Consider de-escalating   antibiotics when PCT concentration is <80% of peak or abs PCT <0.5.     06/03/2019 22.13 (HH) ng/ml Final     Comment:     Critical Value called to and read back by  HOLLY BERRY AT 0552 BY  TF  >/= 10.00 ng/ml   Very high likelihood of severe sepsis or septic shock.  Recommendation: Strongly recommend initiating or continuing antibiotics.   Evaluate culture results and clinical features to target antibacterial   therapy. Obtain blood cultures and other relevant cultures if not done.Repeat   PCT in 2 days to guide antibiotic de-escalation. Consider de-escalating   antibiotics when PCT concentration is <80% of peak or abs PCT <1.       CRP   CRP Inflammation   Date Value Ref Range Status   08/07/2019 226.0 (H) 0.0 - 8.0 mg/L Final   08/05/2019 197.0 (H) 0.0 - 8.0 mg/L Final   06/07/2019 139.0 (H) 0.0 - 8.0 mg/L Final     Culture Results:   7-Day Micro Results     Procedure Component Value Units Date/Time   Fluid Culture Aerobic Bacterial [[id number]] Collected: 08/06/19 1446   Order Status: Completed Lab Status: Preliminary result Updated: 08/07/19 0740   Specimen: Left Shoulder from Right Shoulder     Specimen Description Left Shoulder    Culture Micro Culture in progress   Gram stain [[id number]] (Abnormal) Collected: 08/06/19 1446   Order Status: Completed Lab Status: Final result Updated: 08/06/19 1618   Specimen: Left Shoulder     Specimen Description Left Shoulder    Gram Stain Many   PMNs seen      Few   Mononuclear cells      Rare   Gram positive cocci in clusters   some intracellular   Abnormal      Critical Value called to and read back by   Kenisha Rolle at 1617 on 8/6/19 by Audrey Granger    Fluid Culture Aerobic Bacterial [[id number]] Collected: 08/06/19 1436   Order Status: Completed Lab Status: Preliminary result Updated: 08/07/19 0743   Specimen: Right Shoulder from Left Shoulder     Specimen Description Right Shoulder    Culture Micro Culture in progress   Gram stain [[id number]] (Abnormal) Collected: 08/06/19 1436   Order Status: Completed Lab Status: Edited Result - FINAL Updated: 08/06/19 1618   Specimen: Right Shoulder     Specimen Description Right Shoulder    Gram Stain Many   PMNs seen      Few    Mononuclear cells      Rare   Gram positive cocci in clusters   some intracellular   Abnormal      Critical Value called to and read back by   Kenisha Rolle at 1617 on 8/6/19 by Audrey Granger    Active MRSA Surveillance Culture [[id number]] Collected: 08/06/19 0335   Order Status: Completed Lab Status: Final result Updated: 08/07/19 0701   Specimen: Swab from Nose     Specimen Description Swab    Culture Micro No MRSA isolated   Blood culture [[id number]] (Abnormal) Collected: 08/05/19 2120   Order Status: Completed Lab Status: Preliminary result Updated: 08/06/19 1140   Specimen: Blood     Specimen Description Blood Right Arm    Culture Micro 2 of 2 bottles   Gram positive cocci in clusters   Identification to follow.   Abnormal      Critical Value called to and read back by   Lyle Phelps 09Eva 8/6/19 by Kamila Bhagat    Blood culture [[id number]] (Abnormal) Collected: 08/05/19 2115   Order Status: Completed Lab Status: Preliminary result Updated: 08/06/19 1140   Specimen: Blood     Specimen Description Blood Right Arm    Culture Micro 2 of 2 bottles   Gram positive cocci in clusters   Identification and susceptibility to follow.   Abnormal      Critical Value called to and read back by   Dorothy Phelps 0935 8/6/19 by Kamila Bhagat    Urine Culture Aerobic Bacterial [[id number]] Collected: 08/05/19 0335   Order Status: Completed Lab Status: Preliminary result Updated: 08/06/19 0711   Specimen: Midstream Urine     Specimen Description Midstream Urine    Culture Micro Culture in progress      Recommendations/Interventions:  1. Gram positive cocci in clusters, likely indicating staph, identified in blood culture. Waiting on susceptibilities. No recommendations at this time, will continue to monitor.     Guevara Teague  August 7, 2019

## 2019-08-07 NOTE — PLAN OF CARE
Activity and movement limited by pain in arms & shoulders.  Order obtained for stronger pain medication.  Wound vac replaced by wound care team.    Face to face report given with opportunity to observe patient.    Report given to Julienne CLARK   8/7/2019  12:16 AM

## 2019-08-07 NOTE — BRIEF OP NOTE
[facility]    Brief Operative Note    Pre-operative diagnosis: RIGHT SHOULDER ABSCESS  Post-operative diagnosis R Shoulder Glenohumeral Septic Arthritis  Procedure: Procedure(s):  INCISION AND DRAINAGE, SHOULDER REGION  Surgeon: Surgeon(s) and Role:     * Elver Reeves MD - Primary  Anesthesia: General   Estimated blood loss: Less than 50 ml  Drains: [place]  Specimens: * No specimens in log *  Findings:   None.  Complications: None.  Implants:  * No implants in log *       [id number]

## 2019-08-07 NOTE — ANESTHESIA PROCEDURE NOTES
Peripheral nerve/Neuraxial procedure note : Interscalene  Pre-Procedure  Performed by   Referred by DR. DOBSON  Location: pre-op    Procedure Times:8/7/2019 7:20 AM and 8/7/2019 7:38 AM  Pre-Anesthestic Checklist: patient identified, IV checked, risks and benefits discussed, informed consent, monitors and equipment checked, pre-op evaluation, at physician/surgeon's request and post-op pain management    Timeout  Correct Patient: Yes   Correct Procedure: Yes   Correct Site: Yes   Correct Laterality: Yes   Correct Position: Yes   Site Marked: N/A   .   Procedure Documentation    .    Procedure:  right  Interscalene.     Ultrasound used to identify targeted nerve, plexus, or vascular marker and placed a needle adjacent to it., Ultrasound was used to visualize the spread of the anesthetic in close proximity to the above stated nerve. A permanent image is entered into the patient's record.  Patient Prep;chlorhexidine gluconate and isopropyl alcohol.  .  Needle: insulated Insertion Method: Single Shot.       Assessment/Narrative  Paresthesias: No.  Injection made incrementally with aspirations every 5 mL..  The placement was negative for: blood aspirated, painful injection and site bleeding.  Bolus given via needle..   Secured via.   Complications:.

## 2019-08-07 NOTE — OP NOTE
Procedure Date: 08/07/2019      PREOPERATIVE DIAGNOSIS:  Right shoulder abscess with extension into the subdeltoid space.      POSTOPERATIVE DIAGNOSIS:  Right shoulder abscess with extension into the subdeltoid space and glenohumeral joint.      PROCEDURES:   1.  Open irrigation and debridement, right shoulder abscess, and excision of necrotic bursal material.   2.  Open irrigation and debridement, right glenohumeral joint.      SURGEON:  Elver Reeves MD.      ASSISTANT:  None.      ANESTHESIA:   1.  General by LMA.   2.  Interscalene nerve block.      FINDINGS:   1.  Large amount of subdeltoid fluid collection.   2.  Massive rotator cuff tear with subsequent glenohumeral septic arthritis.   3.  Satisfactory irrigation and debridement.   4.  Adequate hemostasis.      SPECIMENS:  None.      DRAINS:  A small JOSE placed to suction.      COMPLICATIONS:  None.      ESTIMATED BLOOD LOSS:  Less than 25 mL.      INDICATIONS:  The patient is an 79-year-old male with multiple medical comorbidities.  He was admitted for sepsis.  He was bacteremic, found to have multiple sources of infection.  One was his right shoulder abscess with possible extension to his subdeltoid space and glenohumeral joint.  I was consulted.  Discussed with the patient the need for open I and D of this infection.  Risks and benefits were discussed, consent was obtained.      DESCRIPTION OF PROCEDURE:  The patient was seen in the preoperative area, surgical site was marked.  Questions were answered.  He was taken to the operating room, placed under general anesthesia.  Once he was stable, was placed in the beach chair position.  His head was placed neutral.  His right upper extremity was prepped and draped in sterile fashion with ChloraPrep.  Timeout was called to identify the correct patient, correct surgical site.        I made a deltopectoral incision and a deltopectoral approach to the shoulder.  I identified the interval between the deltoid and  pectoralis and identified the subdeltoid space.  There was a large fluid collection in the subdeltoid space within the chronic bursal material.  I did a thorough debridement and bursectomy of the subdeltoid and subacromial spaces.  There was also a massive rotator cuff tear with exposed glenohumeral joint.  This deemed it septic arthritis.  I then used 3000 mL of normal saline solution to wash out the subdeltoid space and glenohumeral joint.  Once a thorough debridement was completed, I placed a small JOSE drain, closed the deltopectoral interval with a running 0 PDS suture, closed the subcutaneous tissue with 2-0 PDS and staples.  A sterile dressing was applied.  He was awakened, transferred to PACU in stable condition.      POSTOPERATIVE PLAN:  He will continue his current IV antibiotics, remove the drain in 24-48 hours.  Follow up in my clinic in 2 weeks for a wound check, staple removal.  No x-rays needed.         CHRISTIANO DOBSON MD             D: 2019   T: 2019   MT: LEA      Name:     CHRISTI BAPTISTE   MRN:      [id number]        Account:        [id number]   :      1940           Procedure Date: 2019      Document: [id number]

## 2019-08-07 NOTE — PROGRESS NOTES
[facility]    Hospitalist Progress Note    Date of Service (when I saw the patient): 08/07/2019    Assessment & Plan       Abscess of right shoulder: CT chest shows abscess anterior shoulder looks to communicating with shoulder joint. He had a steroid injection done in clinic 7/6. There is small amount of air in the abscess. He reports relief after injection but 2 days later increased shoulder pain and has had progressive widespread joint pain since then with decreased motion in the right shoulder. Additionally he complains of widespread large joint pain and progressive malaise and weakness. US aspiration done 8/6, ash pus. Dr. Reeves did I&D this morning. Confirmed right shoulder abscess with extension into the subdeltoid space and glenohumeral joint.  He does not have any signs of sepsis, lactic acid negative x2, mild fever only. Continue Vanco and Zosyn.     Fluid collection left axilla: Patient noted left arm edema and presented with same to his PCP. An US was ordered and completed 8/5. That showed a large complex fluid collection 3x10x3. He has bilateral shoulder pain with left arm edema and generalized pain. Dr. Reeves declined to attempt I&D of this areas at this time as no clear communication with actual shoulder joint. On further review with radiology it does appear to be communicating into the joint. For now will continue antibiotics and follow.       Gram-positive bacteremia:4/4 bottles-staph.  Presented with sequela of infection. Initially considering open abdominal wound there was concern for abdominal infection. CT scan ofchest/abd was done and cleared abdomen. Wound vac removed by Dr. Vilchis and assessed and looks clean. He is not having any abdominal symptoms. There is a concern that bilateral septic shoulders are seeded from some other location. Will get 2d ECHO today, though unlikely this will be sufficient to rule out endocarditis. He does not report any symptoms of endocarditis per  se though did present with dyspnea on initial arrival.       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 He did have partial colectomy and decided against pursuing chemotherapy and is on surveillance every 3 months with Dr. Burrell.      Open abdominal wall wound: Open since small bowel obstruction and subsequent perforation with septic shock. His wound is stable, no abdominal pain.       Benign essential hypertension: Stable right now, will hold off on home Benazepril with acute severe infection.      Chronic atrial fibrillation (H): Intolerant of full anticoagulation, he has a pacemaker and when last here was 100% paced. HR is regular on auscultation.      Dementia: During his last hospitalization in 5/2019 his PCP stated he had some mild chronic dementia. During his stay this got acutely worse and required seroquel for symptom management. His mentation cleared and he was titrated off this after discharge. Today he is clear but forgetful though alert and oriented x3.       DVT Prophylaxis: Pneumatic Compression Devices  Code Status: DNR    Disposition: Expected discharge in several days once infection improved, joint clear.    Kenisha Rolle, CNP    Interval History   Feels tired, aching joints. Has had severe left knee pain for for as long as shoulders have bothered him as well.  He denies chest pain, shortness of breath or abdominal pain. Rests unless aroused.     -Data reviewed today: I reviewed all new labs and imaging results over the last 24 hours.     Physical Exam   Temp: 98.2  F (36.8  C) Temp src: Temporal BP: 111/65   Heart Rate: 88 Resp: 18 SpO2: 97 % O2 Device: None (Room air) Oxygen Delivery: 10 LPM  Vitals:    08/05/19 2007 08/06/19 0154   Weight: 99.8 kg (220 lb) 96.3 kg (212 lb 4.9 oz)     Vital Signs with Ranges  Temp:  [97.4  F (36.3  C)-98.8  F (37.1  C)] 98.2  F (36.8  C)  Heart Rate:  [64-88] 88  Resp:  [14-21] 18  BP: (110-166)/(56-89) 111/65  SpO2:  [91 %-100 %] 97 %  I/O last  3 completed shifts:  In: 2252 [P.O.:440; I.V.:1812]  Out: 545 [Urine:525; Drains:5; Blood:15]    Peripheral IV 08/07/19 Right Hand (Active)   Site Assessment WDL except;Other (Comment) 8/7/2019  3:56 PM   Line Status Saline locked;Checked every 1-2 hour 8/7/2019  3:56 PM   Phlebitis Scale 0-->no symptoms 8/7/2019  9:51 AM   Infiltration Scale 0 8/7/2019  9:51 AM   Number of days: 0       Peripheral IV 08/07/19 Left Upper forearm (Active)   Site Assessment WDL 8/7/2019  3:56 PM   Line Status Infusing;Checked every 1-2 hour 8/7/2019  3:56 PM   Phlebitis Scale 0-->no symptoms 8/7/2019  3:56 PM   Infiltration Scale 0 8/7/2019  3:56 PM   Number of days: 0       Closed/Suction Drain 1 Right Shoulder Bulb 10 Luxembourgish (Active)   Site Description UTV 8/7/2019  9:28 AM   Dressing Status Normal: Clean, Dry & Intact 8/7/2019  9:28 AM   Status To bulb suction 8/7/2019  9:28 AM   Output (ml) 5 ml 8/7/2019  9:28 AM   Number of days: 0       Negative Pressure Wound Therapy Abdomen Mid (Active)   Number of days: 63       Pressure Injury 08/06/19 Bilateral;Posterior Coccyx Red blanchable (Active)   Wound Base Erythema, blanchable 8/7/2019  9:28 AM   Magaly-wound Assessment Blanchable erythema 8/6/2019  2:04 AM   Drainage Amount None 8/7/2019  9:28 AM   Wound Care/Cleansing Barrier applied  8/7/2019  9:28 AM   Dressing Open to air 8/7/2019  9:28 AM   Number of days: 1       Incision/Surgical Site 06/02/19 Abdomen (Active)   Incision Assessment UTV 8/7/2019  9:28 AM   Magaly-Incision Assessment UTV;Other (Comment) 8/6/2019  7:50 AM   Closure Other (Comment) 8/6/2019  7:50 AM   Incision Drainage Amount Scant 8/6/2019  7:50 AM   Drainage Description Serosanguinous 8/7/2019  9:28 AM   Incision Care Therapy - negative pressure 8/7/2019  9:28 AM   Dressing Intervention Clean, dry, intact 8/7/2019  9:28 AM   Number of days: 66       Incision/Surgical Site 08/07/19 Right Shoulder (Active)   Incision Assessment UTV 8/7/2019  9:28 AM   Closure  Gigi;Sutures 8/7/2019  8:36 AM   Dressing Intervention Clean, dry, intact 8/7/2019  9:28 AM   Number of days: 0     Line/device assessment(s) completed for medical necessity    Constitutional: Awake,alert, ill appearing but improved.   Respiratory: Clear bilaterally, no wheezes, crackles or rhonchi.  Cardiovascular: HRR, no murmurs, rubs, thrills.   GI: Obese, soft, nontender, bandages covering midline wound..  Skin/Integumen: Pale, no unusual bruising, open areas or rashes.   Other: No large palpable mass on the right anterior chest/shoulder. He does have pain with movement of the right shoulder. The left arm/shoulder is actually causing him more pain. He has significant 2+ pitting edema to left upper and lower arm.  Dran in place right shoulder. Left knee is very painful to palpation, particularly above the patella and up the thigh.     Medications     lactated ringers Stopped (08/07/19 0856)       piperacillin-tazobactam  4.5 g Intravenous Q6H     saccharomyces boulardii  250 mg Oral BID     sodium chloride (PF)  3 mL Intracatheter Q8H     vancomycin (VANCOCIN) IV  1,750 mg Intravenous Q12H       Data   Recent Labs   Lab 08/07/19  0528 08/06/19  0947 08/05/19  2115   WBC 14.4* 13.5* 13.0*   HGB 9.1* 8.3* 8.8*   MCV 87 86 85    275 295    136 135   POTASSIUM 3.6 3.3* 3.4   CHLORIDE 103 104 102   CO2 26 26 26   BUN 24 20 24   CR 0.76 0.67 0.81   ANIONGAP 6 6 7   ERIKA 8.1* 8.0* 8.3*   * 98 126*   ALBUMIN  --  1.5* 1.6*   PROTTOTAL  --  5.8* 6.1*   BILITOTAL  --  1.0 1.5*   ALKPHOS  --  131 147   ALT  --  54 67   AST  --  32 44   LIPASE  --   --  52*       Recent Results (from the past 24 hour(s))   XR Knee Port Left 1/2 Views    Narrative    XR KNEE PORT LT 1/2 VW    HISTORY: 79 years Male pain, known joint infection bilateral shoulders  with bacteremia    COMPARISON: 4/7/2008    TECHNIQUE: 2 views left knee    FINDINGS: There is diffuse subcutaneous edema. There has been interval  removal of  the patella. There is a small ossicle or calcification at  the expected location of the patella.    There is a small to moderate-sized joint effusion.    There is mild medial compartment joint space narrowing.      Impression    IMPRESSION: Diffuse subcutaneous edema with small to moderate-sized  joint effusion.    The patella is now absent. A small ossicle at the patellar that is now  present.    STACIE RAMIREZ MD

## 2019-08-07 NOTE — ANESTHESIA CARE TRANSFER NOTE
Patient: Ag Perez    Procedure(s):  INCISION AND DRAINAGE, SHOULDER REGION    Diagnosis: RIGHT SHOULDER ABSCESS  Diagnosis Additional Information: No value filed.    Anesthesia Type:   General, LMA, Periph. Nerve Block for postop pain     Note:  Airway :Face Mask  Patient transferred to:PACU  Handoff Report: Identifed the Patient, Identified the Reponsible Provider, Reviewed the pertinent medical history, Discussed the surgical course, Reviewed Intra-OP anesthesia mangement and issues during anesthesia, Set expectations for post-procedure period and Allowed opportunity for questions and acknowledgement of understanding      Vitals: (Last set prior to Anesthesia Care Transfer)    CRNA VITALS  8/7/2019 0822 - 8/7/2019 0856      8/7/2019             Resp Rate (set):  8                Electronically Signed By: JIN Hutchins CRNA  August 7, 2019  8:56 AM

## 2019-08-07 NOTE — PLAN OF CARE
Pt A&Ox3. He reports mild pain in left shoulder and left knee. Declined any pain medication. VSS. 4+ edema in left arm and remains elevated when in bed. Pt requested to sit at edge of bed most of night. 2+ edema in BLE. Wound vac in place and dressing CDI. IVF and IV antibiotics continued. IV site changed due to leaking.  Voiding adequate amounts however very dark in color. Up with heavy assist of two. Stood at bedside for brief periods and only took a couple steps then complained about feeling too weak. Alarms on, call light within reach and pt calls appropriately.

## 2019-08-07 NOTE — PHARMACY-VANCOMYCIN DOSING SERVICE
Pharmacy Vancomycin Note  Date of Service 2019  Patient's  1940   79 year old, male    Indication: Abscess and Bacteremia  Goal Trough Level: 15-20 mg/L  Day of Therapy: 2  Current Vancomycin regimen: 1750 mg IV q12h    Current estimated CrCl = Estimated Creatinine Clearance: 85.8 mL/min (based on SCr of 0.76 mg/dL).    Creatinine for last 3 days  2019:  9:15 PM Creatinine 0.81 mg/dL  2019:  9:47 AM Creatinine 0.67 mg/dL  2019:  5:28 AM Creatinine 0.76 mg/dL    Recent Vancomycin Levels (past 3 days)  2019:  2:28 PM Vancomycin Level 18.4 mg/L    Vancomycin IV Administrations (past 72 hours)                   vancomycin (VANCOCIN) 1,750 mg in sodium chloride 0.9 % 500 mL intermittent infusion (mg) 1,750 mg Given 19 0322     1,750 mg Given 19 1609    vancomycin (VANCOCIN) 100 mg/mL injection (mg) 1,500 mg New Bag 19 0327                Nephrotoxins and other renal medications (From now, onward)    Start     Dose/Rate Route Frequency Ordered Stop    19 1530  vancomycin (VANCOCIN) 1,750 mg in sodium chloride 0.9 % 500 mL intermittent infusion      1,750 mg  284 mL/hr over 2 Hours Intravenous EVERY 12 HOURS 19 1043      19 1015  piperacillin-tazobactam (ZOSYN) intermittent infusion 4.5 g      4.5 g  200 mL/hr over 30 Minutes Intravenous EVERY 6 HOURS 19 1009               Contrast Orders - past 72 hours (72h ago, onward)    Start     Dose/Rate Route Frequency Ordered Stop    19 2226  iopamidol (ISOVUE-300) IV solution 61% 100 mL      100 mL Intravenous ONCE 19 2225 19 2304        Interpretation of levels and current regimen:  Trough level is Therapeutic    Has serum creatinine changed > 50% in last 72 hours: No    Urine output: unable to determine    Renal Function: Worsening    Plan:  1. Continue Current Dose  2. Pharmacy will check trough levels as appropriate in 1-3 Days.    3. Serum creatinine levels will be ordered daily for  the first week of therapy and at least twice weekly for subsequent weeks.      Alisha Whitney, EtienneD        .

## 2019-08-07 NOTE — ANESTHESIA PREPROCEDURE EVALUATION
Anesthesia Pre-Procedure Evaluation    Patient: Ag Perez   MRN: [id number] : 1940          Preoperative Diagnosis: RIGHT SHOULDER ABSCESS    Procedure(s):  INCISION AND DRAINAGE, SHOULDER REGION    Past Medical History:   Diagnosis Date     Arrhythmia      Hypertension      Pacemaker      Past Surgical History:   Procedure Laterality Date     CARDIAC SURGERY       COLONOSCOPY       COLONOSCOPY N/A 2015    Procedure: COLONOSCOPY;  Surgeon: Kameron De Santiago MD;  Location: [facility] OR     LAPAROTOMY EXPLORATORY N/A 2019    Procedure: EXPLORATORY LAPAROTOMY WITH RESECTION OF SMALL BOWEL, JEJUNUM, TEMPORARY ABDOMINAL WALL CLOSURE, SEROSAL REPAIR;  Surgeon: Pastor Vilchis MD;  Location: [facility]     LAPAROTOMY EXPLORATORY N/A 2019    Procedure: EXPLORATORY LAPAROTOMY, SMALL BOWEL RESECTION-JEJUNUM, SMALL BOWEL ANASTOMOSIS, ABDOMINAL WASH OUT, FASCIAL CLOSURE;  Surgeon: Pastor Vilchis MD;  Location: [facility]       Anesthesia Evaluation     . Pt has had prior anesthetic.            ROS/MED HX    ENT/Pulmonary:     (+)tobacco use, Past use , . .    Neurologic:  - neg neurologic ROS     Cardiovascular:     (+) Dyslipidemia, hypertension----. : . . . pacemaker :. dysrhythmias a-fib, .       METS/Exercise Tolerance:     Hematologic:  - neg hematologic  ROS       Musculoskeletal:   (+) arthritis,  -       GI/Hepatic:     (+) Other GI/Hepatic s/p EXPLORATORY LAPAROTOMY, SMALL BOWEL RESECTION-JEJUNUM, SMALL BOWEL ANASTOMOSIS      Renal/Genitourinary:         Endo:     (+) Obesity, .      Psychiatric:  - neg psychiatric ROS       Infectious Disease:         Malignancy:   (+) Malignancy History of GI  GI CA status post Surgery,         Other:    - neg other ROS                      Physical Exam  Normal systems: pulmonary and dental    Airway   Mallampati: II  TM distance: >3 FB  Neck ROM: full    Dental     Cardiovascular   Rhythm and rate: regular and normal      Pulmonary    breath sounds clear to  "auscultation            Lab Results   Component Value Date    WBC 14.4 (H) 08/07/2019    HGB 9.1 (L) 08/07/2019    HCT 27.8 (L) 08/07/2019     08/07/2019    .0 (H) 08/07/2019     08/07/2019    POTASSIUM 3.6 08/07/2019    CHLORIDE 103 08/07/2019    CO2 26 08/07/2019    BUN 24 08/07/2019    CR 0.76 08/07/2019     (H) 08/07/2019    ERIKA 8.1 (L) 08/07/2019    PHOS 3.0 06/01/2019    MAG 2.5 (H) 06/03/2019    ALBUMIN 1.5 (L) 08/06/2019    PROTTOTAL 5.8 (L) 08/06/2019    ALT 54 08/06/2019    AST 32 08/06/2019    ALKPHOS 131 08/06/2019    BILITOTAL 1.0 08/06/2019    LIPASE 52 (L) 08/05/2019    INR 1.16 05/31/2019    TSH 0.25 (L) 05/31/2019    T4 1.25 05/31/2019       Preop Vitals  BP Readings from Last 3 Encounters:   08/07/19 142/74   08/02/19 142/84   07/30/19 112/66    Pulse Readings from Last 3 Encounters:   08/06/19 82   08/02/19 100   07/30/19 88      Resp Readings from Last 3 Encounters:   08/07/19 20   08/02/19 16   07/19/19 16    SpO2 Readings from Last 3 Encounters:   08/07/19 94%   07/30/19 95%   07/10/19 96%      Temp Readings from Last 1 Encounters:   08/07/19 97.4  F (36.3  C) (Oral)    Ht Readings from Last 1 Encounters:   08/06/19 1.727 m (5' 8\")      Wt Readings from Last 1 Encounters:   08/06/19 96.3 kg (212 lb 4.9 oz)    Estimated body mass index is 32.28 kg/m  as calculated from the following:    Height as of this encounter: 1.727 m (5' 8\").    Weight as of this encounter: 96.3 kg (212 lb 4.9 oz).       Anesthesia Plan      History & Physical Review  History and physical reviewed and following examination; no interval change.    ASA Status:  3 emergent.    NPO Status:  > 8 hours    Plan for General, LMA and Periph. Nerve Block for postop pain with Intravenous and Propofol induction. Maintenance will be Balanced.    PONV prophylaxis:  Ondansetron (or other 5HT-3) and Dexamethasone or Solumedrol       Postoperative Care  Postoperative pain management:  IV analgesics, Oral pain " medications and Peripheral nerve block (Single Shot).      Consents  Anesthetic plan, risks, benefits and alternatives discussed with:  Patient..                 Laureano Centeno MD

## 2019-08-07 NOTE — PLAN OF CARE
Return from surgery around 0940.  Report in room by Allan MCCAULEY.  Denies pain.  Lungs clear.  Edema to all extremities.  Both arms elevated on pillows.  Sling in place to Rt arm.  IV infusing to LAC.  Arm puffy but no change noted since return to room.  Voiding without difficulty.  Urine dark but clear.  JOSE drain to rt shoulder with serosanguinous drainage.  CMS intact to RUE, able to move fingers.   Tolerating regular diet.  Wound vac in place to midline abdomen.  Continues on Zosyn.  Vanco trough scheduled this afternoon.  Son here visiting this afternoon, able to updated by NP at bedside.      Face to face report given with opportunity to observe patient.    Report given to Jose Ramirez   8/7/2019  3:40 PM

## 2019-08-08 ENCOUNTER — TRANSFERRED RECORDS (OUTPATIENT)
Dept: HEALTH INFORMATION MANAGEMENT | Facility: CLINIC | Age: 79
End: 2019-08-08

## 2019-08-08 VITALS
BODY MASS INDEX: 33.61 KG/M2 | WEIGHT: 221.78 LBS | OXYGEN SATURATION: 94 % | RESPIRATION RATE: 18 BRPM | DIASTOLIC BLOOD PRESSURE: 79 MMHG | TEMPERATURE: 98.1 F | HEIGHT: 68 IN | HEART RATE: 82 BPM | SYSTOLIC BLOOD PRESSURE: 139 MMHG

## 2019-08-08 LAB
ANION GAP SERPL CALCULATED.3IONS-SCNC: 4 MMOL/L (ref 3–14)
BACTERIA SPEC CULT: ABNORMAL
BUN SERPL-MCNC: 29 MG/DL (ref 7–30)
CALCIUM SERPL-MCNC: 7.8 MG/DL (ref 8.5–10.1)
CHLORIDE SERPL-SCNC: 105 MMOL/L (ref 94–109)
CO2 SERPL-SCNC: 26 MMOL/L (ref 20–32)
CREAT SERPL-MCNC: 0.84 MG/DL (ref 0.66–1.25)
CRP SERPL-MCNC: 146 MG/L (ref 0–8)
ERYTHROCYTE [DISTWIDTH] IN BLOOD BY AUTOMATED COUNT: 16.8 % (ref 10–15)
GFR SERPL CREATININE-BSD FRML MDRD: 82 ML/MIN/{1.73_M2}
GLUCOSE SERPL-MCNC: 214 MG/DL (ref 70–99)
HCT VFR BLD AUTO: 26.1 % (ref 40–53)
HGB BLD-MCNC: 8.2 G/DL (ref 13.3–17.7)
MCH RBC QN AUTO: 27.9 PG (ref 26.5–33)
MCHC RBC AUTO-ENTMCNC: 31.4 G/DL (ref 31.5–36.5)
MCV RBC AUTO: 89 FL (ref 78–100)
PLATELET # BLD AUTO: 270 10E9/L (ref 150–450)
POTASSIUM SERPL-SCNC: 4.4 MMOL/L (ref 3.4–5.3)
RBC # BLD AUTO: 2.94 10E12/L (ref 4.4–5.9)
SODIUM SERPL-SCNC: 135 MMOL/L (ref 133–144)
SPECIMEN SOURCE: ABNORMAL
WBC # BLD AUTO: 11.3 10E9/L (ref 4–11)

## 2019-08-08 PROCEDURE — 25000132 ZZH RX MED GY IP 250 OP 250 PS 637: Performed by: ORTHOPAEDIC SURGERY

## 2019-08-08 PROCEDURE — 25800030 ZZH RX IP 258 OP 636: Performed by: ORTHOPAEDIC SURGERY

## 2019-08-08 PROCEDURE — 36415 COLL VENOUS BLD VENIPUNCTURE: CPT | Performed by: NURSE PRACTITIONER

## 2019-08-08 PROCEDURE — 25000128 H RX IP 250 OP 636: Performed by: ORTHOPAEDIC SURGERY

## 2019-08-08 PROCEDURE — 85027 COMPLETE CBC AUTOMATED: CPT | Performed by: NURSE PRACTITIONER

## 2019-08-08 PROCEDURE — 99239 HOSP IP/OBS DSCHRG MGMT >30: CPT | Performed by: NURSE PRACTITIONER

## 2019-08-08 PROCEDURE — 86140 C-REACTIVE PROTEIN: CPT | Performed by: NURSE PRACTITIONER

## 2019-08-08 PROCEDURE — 80048 BASIC METABOLIC PNL TOTAL CA: CPT | Performed by: NURSE PRACTITIONER

## 2019-08-08 RX ADMIN — RDII 250 MG CAPSULE 250 MG: at 08:27

## 2019-08-08 RX ADMIN — TAZOBACTAM SODIUM AND PIPERACILLIN SODIUM 4.5 G: 500; 4 INJECTION, SOLUTION INTRAVENOUS at 01:01

## 2019-08-08 RX ADMIN — HYDROCODONE BITARTRATE AND ACETAMINOPHEN 1 TABLET: 5; 325 TABLET ORAL at 03:52

## 2019-08-08 RX ADMIN — SODIUM CHLORIDE, POTASSIUM CHLORIDE, SODIUM LACTATE AND CALCIUM CHLORIDE: 600; 310; 30; 20 INJECTION, SOLUTION INTRAVENOUS at 01:03

## 2019-08-08 RX ADMIN — VANCOMYCIN HYDROCHLORIDE 1750 MG: 5 INJECTION, POWDER, LYOPHILIZED, FOR SOLUTION INTRAVENOUS at 14:44

## 2019-08-08 RX ADMIN — VANCOMYCIN HYDROCHLORIDE 1750 MG: 5 INJECTION, POWDER, LYOPHILIZED, FOR SOLUTION INTRAVENOUS at 03:47

## 2019-08-08 RX ADMIN — TAZOBACTAM SODIUM AND PIPERACILLIN SODIUM 4.5 G: 500; 4 INJECTION, SOLUTION INTRAVENOUS at 06:10

## 2019-08-08 RX ADMIN — TAZOBACTAM SODIUM AND PIPERACILLIN SODIUM 4.5 G: 500; 4 INJECTION, SOLUTION INTRAVENOUS at 12:25

## 2019-08-08 ASSESSMENT — MIFFLIN-ST. JEOR: SCORE: 1685.5

## 2019-08-08 ASSESSMENT — ACTIVITIES OF DAILY LIVING (ADL)
ADLS_ACUITY_SCORE: 18
ADLS_ACUITY_SCORE: 20
ADLS_ACUITY_SCORE: 18

## 2019-08-08 NOTE — PLAN OF CARE
Temp: 98.1  F (36.7  C) Temp src: Tympanic BP: 139/79   Heart Rate: 85 Resp: 18 SpO2: 94 % O2 Device: None (Room air)      Alert, at times forgetful at times.  No c/o pain this shift; up in the chair most of the day. Abd wound vac in place, R shoulder dressing CDI with JOSE drain putting out 20ml bloody/red drainage this shift. LR at 100 with vanco and zosyn. Up to the commode Ax1, uses call lt appropriately.     Face to face report given with opportunity to observe patient.    Report given to Francia RN & Lorenzo Schilling   8/8/2019  4:39 PM

## 2019-08-08 NOTE — ANESTHESIA POSTPROCEDURE EVALUATION
Patient: Ag Perez    Procedure(s):  INCISION AND DRAINAGE, SHOULDER REGION    Diagnosis:RIGHT SHOULDER ABSCESS  Diagnosis Additional Information: No value filed.    Anesthesia Type:  General, LMA, Periph. Nerve Block for postop pain    Note:  Anesthesia Post Evaluation    Patient location during evaluation: Floor and Bedside  Patient participation: Able to fully participate in evaluation  Level of consciousness: awake and alert  Pain management: adequate  Airway patency: patent  Cardiovascular status: acceptable  Respiratory status: acceptable  Hydration status: stable  PONV: none     Anesthetic complications: None    Comments: Patient states he has no pain in operative shoulder. He is able to move his hand now, but not lift his arm.        Last vitals:  Vitals:    08/07/19 1100 08/07/19 1700 08/07/19 1942   BP: 111/65 129/55 110/54   Pulse:      Resp:  18 17   Temp:  98.6  F (37  C) 98.7  F (37.1  C)   SpO2: 97% 97%          Electronically Signed By: JIN Malave CRNA  August 7, 2019  8:01 PM

## 2019-08-08 NOTE — PLAN OF CARE
Skin assessed head to toe with pts permission during monthly skin rounds. No pressure areas noted. Yen MCCAULEY and Lesli MCCAULEY.

## 2019-08-08 NOTE — PLAN OF CARE
"Patient was transferred to [facility]  at approximately 5:30 PM via [facility].   Patient status stable. Patient's most recent vitals: Blood pressure 139/79, pulse 82, temperature 98.1  F (36.7  C), temperature source Tympanic, resp. rate 18, height 1.727 m (5' 8\"), weight 100.6 kg (221 lb 12.5 oz), SpO2 94 %.  Pt transferred with intact IV.Yes  Intact IV site at Left AC - Vanco was just finishing (still infusing for EMS crew- they will complete then lock IV)   Pt transferred with oxygen. .No   O@2via room airOther LDA\"s: wound vac to abdomen, JOSE drain to right shoulder.   Belongings were sent with Patient  AVS and pertinent records sent with patient. .Yes   Report given to [facility] EMS crew in SBAR format. Report was given to [facility] by previous shift.       1730- updated [facility] on probable ETA.   "

## 2019-08-08 NOTE — DISCHARGE SUMMARY
[facility]    Discharge/Transfer Summary  Hospitalist    Date of Admission:  8/5/2019  Date of Discharge:  8/8/2019  Discharging Provider: Kenisha Rolle CNP  Date of Service (when I saw the patient): 08/08/19    Discharge Diagnoses     Principal Problem:    Abscess of right shoulder  Active Problems:    Gram-positive bacteremia    Malignant neoplasm of transverse colon (H)    Open abdominal wall wound    Benign essential hypertension    Gastroesophageal reflux disease with esophagitis    Chronic atrial fibrillation (H)      History of Present Illness   From admission: Ag Perez is a 79 year old male who presents with fever and weakness. The patient was brought into the ER last evening with fevers, chills and myalgias. Upon arrival to the ER his temperature was normal. He felt fine with no chest pain or shortness of breath. Labs and imaging were done while in the ER. He had a leukocytosis of 13k and an elevated CRP. CT scan of his chest/ab/pelvis was performed that showed a right shoulder abscess. The wound vac was left in place and never taken down to evaluate the midline wound. The patient was then admitted with the fever of unknown origin? This morning he says that he is feeling good. He has no chest pain or shortness of breath. He has no pain in his right shoulder. He has no abdominal pain. He has no nausea or emesis. He is having bowel movements with no issues.    Hospital Course   Ag Perez was admitted on 8/5/2019.  The following problems were addressed during his hospitalization:    Multiple septic joints: He has multiple septic joints-bilateral shoulders confirmed with needle aspiration, possible left knee as well with symptoms occurring during same time frame and effusion present on US. There is concern that there are more joints involved and he will need extensive orthopedic involvement as well as JESUS to rule out endocarditis. For this reason his is transferred to [facility] for  further care.  I spoke with Pedro Rice who accepted patient in transfer. Dr. Israel CHAPMAN is also aware of the case. I did attempt to contact his son Brian but was unable to reach him, did leave message. I had discussed this possibility with him yesterday when he was visiting.      Abscess of right shoulder: CT chest shows abscess anterior shoulder looks to communicating with shoulder joint. He had a steroid injection done in clinic 7/6. There is small amount of air in the abscess. He reports relief after injection but 2 days later increased shoulder pain and has had progressive widespread joint pain since then with decreased motion in the right shoulder. Additionally he complains of widespread large joint pain and progressive malaise and weakness. US aspiration done 8/6, ash pus. Dr. Reeves did I&D this morning. Confirmed right shoulder abscess with extension into the subdeltoid space and glenohumeral joint.  He does not have any signs of sepsis, lactic acid negative x2, mild fever only. Continue Vanco and Zosyn.      Fluid collection left axilla: Patient noted left arm edema and presented with same to his PCP. An US was ordered and completed 8/5. That showed a large complex fluid collection 3x10x3. He has bilateral shoulder pain with left arm edema and generalized pain. Dr. Reeves declined to attempt I&D of this area at this time as no clear communication with actual shoulder joint. On further review with radiology it does appear to be communicating into the joint. For now will continue antibiotics and follow.        Gram-positive bacteremia:4/4 bottles-staph.  Presented with sequela of infection. Initially considering open abdominal wound there was concern for abdominal infection. CT scan ofchest/abd was done and cleared abdomen. Wound vac removed by Dr. Vilchis and assessed and looks clean. He is not having any abdominal symptoms. There is a concern that bilateral septic shoulders are seeded from some other  location. Will get 2d ECHO did not have great image, unable to visualize leaflets.  He does not report any symptoms of endocarditis per se though did present with dyspnea on initial arrival.         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 He did have partial colectomy and decided against pursuing chemotherapy and is on surveillance every 3 months with Dr. Burrell.       Open abdominal wall wound: Open since small bowel obstruction and subsequent perforation with septic shock. His wound is stable, no abdominal pain. Wound vac in place.         Benign essential hypertension: Stable right now, will hold off on home Benazepril with acute severe infection.       Chronic atrial fibrillation (H): Intolerant of full anticoagulation, he has a pacemaker and when last here was 100% paced. HR is regular on auscultation.       Dementia: During his last hospitalization in 5/2019 his PCP stated he had some mild chronic dementia. During his stay this got acutely worse and required seroquel for symptom management. His mentation cleared and he was titrated off this after discharge. Today he is clear but forgetful though alert and oriented x3.       Kenisha Rolle, CNP    Code Status   DNR       Primary Care Physician   Ary Montanez    Discharge Disposition   Transferred to [facility]  Condition at discharge: Stable    Consultations This Hospital Stay   HOSPITALIST IP CONSULT  PHARMACY TO DOSE VANCO  PHARMACY TO DOSE VANCO  ORTHOPEDIC SURGERY IP CONSULT    Time Spent on this Encounter   I, Kenisha Rolle, personally saw the patient today and spent greater than 30 minutes discharging this patient.    Discharge Orders      Reason for your hospital stay    Bacteremia     DNR (Do Not Resuscitate)     Discharge Medications   Current Discharge Medication List      CONTINUE these medications which have NOT CHANGED    Details   ASPIRIN PO Take 81 mg by mouth daily      atorvastatin (LIPITOR) 10 MG tablet  Take 10 mg by mouth every evening      Ezetimibe (ZETIA PO) Take 10 mg by mouth At Bedtime      ferrous sulfate (FEROSUL) 325 (65 Fe) MG tablet Take 325 mg by mouth daily (with dinner)      LEVOTHYROXINE SODIUM PO Take 125 mcg by mouth daily       Multiple Vitamins-Minerals (THERAPEUTIC M) TABS Take 1 tablet by mouth daily      Omega-3 Fatty Acids (OMEGA-3 FISH OIL PO) Take 1 g by mouth 2 times daily (with meals)       tamsulosin (FLOMAX) 0.4 MG capsule Take 0.4 mg by mouth every evening      BENAZEPRIL HCL PO Take 40 mg by mouth daily      Nitroglycerin (NITROSTAT SL) Place 0.4 mg under the tongue every 5 minutes as needed for chest pain           Allergies   Allergies   Allergen Reactions     Crestor [Rosuvastatin]      Rivaroxaban Other (See Comments)     Bleed     Data   Most Recent 3 CBC's:  Recent Labs   Lab Test 08/08/19  0522 08/07/19  0528 08/06/19  0947   WBC 11.3* 14.4* 13.5*   HGB 8.2* 9.1* 8.3*   MCV 89 87 86    318 275      Most Recent 3 BMP's:  Recent Labs   Lab Test 08/08/19  0522 08/07/19  0528 08/06/19  0947    135 136   POTASSIUM 4.4 3.6 3.3*   CHLORIDE 105 103 104   CO2 26 26 26   BUN 29 24 20   CR 0.84 0.76 0.67   ANIONGAP 4 6 6   ERIKA 7.8* 8.1* 8.0*   * 113* 98     Most Recent 2 LFT's:  Recent Labs   Lab Test 08/06/19  0947 08/05/19  2115   AST 32 44   ALT 54 67   ALKPHOS 131 147   BILITOTAL 1.0 1.5*     Most Recent INR's and Anticoagulation Dosing History:  Anticoagulation Dose History     Recent Dosing and Labs Latest Ref Rng & Units 11/2/2015 5/30/2019 5/31/2019    INR 0.80 - 1.20 1.10 1.05 1.16        Most Recent 3 Troponin's:  Recent Labs   Lab Test 06/01/19  0108 05/31/19  2055 05/31/19  1759   TROPI 0.066* 0.073* 0.084*     Most Recent Cholesterol Panel:  Recent Labs   Lab Test 06/02/19  0458   TRIG 201*     Most Recent 6 Bacteria Isolates From Any Culture (See EPIC Reports for Culture Details):  Recent Labs   Lab Test 08/06/19  1446 08/06/19  1436 08/06/19  0334  08/05/19 2120 08/05/19  2115 08/05/19  0335   CULT Light growth  Staphylococcus aureus  * Moderate growth  Staphylococcus aureus  * No MRSA isolated 2 of 2 bottles  Staphylococcus aureus  See coinciding set for susceptibility testing.  *  Critical Value called to and read back by  Lyle Phelps 0935 8/6/19 by Kamila Bhagat   2 of 2 bottles  Staphylococcus aureus  *  Critical Value called to and read back by  Dorothy Phelps 0935 8/6/19 by Kamila Bhaagt   >100,000 colonies/mL  Staphylococcus aureus  *     Most Recent TSH, T4 and A1c Labs:  Recent Labs   Lab Test 06/01/19  0446 05/31/19  0742   TSH  --  0.25*   T4  --  1.25   A1C 6.3*  --      Results for orders placed or performed during the hospital encounter of 08/05/19   XR Chest Port 1 View    Narrative    Procedure:XR CHEST PORT 1 VW    Clinical history:Male, 79 years, fever    Technique: Single view was obtained.    Comparison: 6/2/2019    Findings: The cardiac silhouette is normal. The pulmonary vasculature  is normal.    The lungs demonstrate linear scarring versus atelectasis at the left  lung base, otherwise are clear. Bony structures are unremarkable.      Impression    Impression:   Left basilar atelectasis versus scarring. No evidence of focal  pneumonia.    RUSSEL CURIEL MD   CT Chest/Abdomen/Pelvis w Contrast    Addendum: 8/7/2019    An ultrasound-guided aspiration of the right shoulder as well as a  fluid collection in the region of the left biceps was performed  yielding purulent appearing fluid and lab results suggesting abscess.    Upon further review of the imaging, there is enlargement and  heterogeneous enhancement of the left biceps muscle as well as a  similar probable involvement of the left subscapularis suggesting  presence of abscess. See series 4 images 22 through 27, series 5 image  33. A similar pattern of involvement in the right subscapularis is  present.    STACIE RAMIREZ MD      Narrative    CT CHEST/ABDOMEN/PELVIS W  CONTRAST    HISTORY: 79 yearsMale with sepsis    TECHNIQUE: Axial CT imaging of the chest abdomen and pelvis was  performed with contrast. Coronal and sagittal reconstructions were  obtained.    COMPARISON: CT abdomen pelvis of 5/30/2019    FINDINGS:    CT CHEST: There is an abnormal fluid collection within the anterior  right shoulder, along the anterior surface of the deltoid and upper  margin of the pectoralis. A small amount of air is seen within this  collection of fluid. This collection of fluid measures 2.2 x 2.7 x 3.2  cm. There is also fluid in the right glenohumeral joint space and  subdeltoid subacromial bursa.  There is no mediastinal or hilar or axillary lymphadenopathy.    The lungs are clear. No consolidating airspace opacities are present.  No concerning pulmonary nodules or masses are present         No concerning osseous lesions are identified    IMPRESSION CHEST: Abnormal collection of fluid anterior to the right  shoulder. Consider abscess. If a recent injection was performed, this  may be sequela of that. There is also a sizable glenohumeral and  subdeltoid subacromial bursal effusion.  CT ABDOMEN AND PELVIS:    Liver: There is normal enhancement of the hepatic parenchyma.    Gallbladder: Surgically absent.    Spleen: Unremarkable    Pancreas: Unremarkable    Adrenals: Unremarkable    Kidneys: Small bilateral renal cysts are present. There is no  hydronephrosis.    Bowel: No abnormally distended or thickened loops of bowel present.  There is a surgical midline laparotomy wound. There has been interval  repair of an anterior abdominal wall hernia as seen on the prior  study.    Lymph nodes: There is no evidence of mass or lymphadenopathy.    PELVIS: There is no evidence of mass lymphadenopathy or abnormal fluid  collection.    There is bilateral spondylolysis of L5 with grade 1 to grade 2  anterolisthesis of L5 in relation S1. There is osteopenia and  multilevel degenerative change of the lumbar  spine.    IMPRESSION ABDOMEN AND PELVIS: Interval surgical repair of anterior  bowel wall hernia as seen on the prior study. There are no acute  changes otherwise.    STACIE RAMIREZ MD   US Joint Injection Aspiration Intermediate Bilateral    Narrative    Exam:US JOINT INJECTION ASPIRATION INTERMEDIATE BILATERAL.    History:79 years Male large complex fluid collection    Comparison:  CT scan chest abdomen pelvis 8/5/2019 and ultrasound of  the left upper extremity 8/5/2019.    Technique: After informed consent was obtained, a timeout was  performed, satisfactorily identifying the patient and the site of the  procedure.    Ultrasound evaluation was performed, demonstrating complex fluid  collection of the right shoulder and another complex fluid collection  in the region of the left axilla .    The patient was then placed in the supine  position and prepped and  draped in usual sterile fashion. 4 mL of 1% lidocaine was then  instilled. An 18-gauge spinal needle was advanced under fluoroscopic  guidance into the distended shoulder joint and adjacent fluid  collections. A total of 26 mL of purulent material was obtained and  sent for analysis.    The patient's left shoulder was then prepped and draped in usual  sterile fashion. A a 22-gauge spinal needle  was then positioned using  ultrasound guidance. Frankly purulent material was obtained from the  complex fluid collection in the left axilla. A total of 16 mL was  obtained in this location.    Specimens were prepared and sent for analysis.           Impression    Impression:  1.  Technically successful ultrasound guided  aspiration of complex  fluid collection of the right shoulder and another complex fluid  collection in the left axilla .    2.  26 mL of frankly purulent material was obtained from the right  shoulder and 16 mL of purulent material was obtained from the left  axillary fluid collection.    3.  No acute complication. Follow-up is pending.    RUSSEL  MD MOISÉS   XR Knee Port Left 1/2 Views    Narrative    XR KNEE PORT LT 1/2 VW    HISTORY: 79 years Male pain, known joint infection bilateral shoulders  with bacteremia    COMPARISON: 4/7/2008    TECHNIQUE: 2 views left knee    FINDINGS: There is diffuse subcutaneous edema. There has been interval  removal of the patella. There is a small ossicle or calcification at  the expected location of the patella.    There is a small to moderate-sized joint effusion.    There is mild medial compartment joint space narrowing.      Impression    IMPRESSION: Diffuse subcutaneous edema with small to moderate-sized  joint effusion.    The patella is now absent. A small ossicle at the patellar that is now  present.    STACIE RAMIREZ MD

## 2019-08-08 NOTE — PLAN OF CARE
"Reason for hospital stay:  right shoulder I&D    Most recent vitals: /54   Pulse 82   Temp 98.7  F (37.1  C) (Tympanic)   Resp 17   Ht 1.727 m (5' 8\")   Wt 96.3 kg (212 lb 4.9 oz)   SpO2 97%   BMI 32.28 kg/m    Pain Management:  oral hydrocodone 1 tab prn for right shoulder pain, right arm elevated  Orientation:  wnl  Cardiac:  wnl  Respiratory:  denies sob  GI:  bowel sounds active, wound vac intact and draining  :  uses urinal some dysuria and hesitancy but per pt not abnormal  Diet:regular needs feeding assistance  Skin Issues:  abd wound vac intact, right shoulder dressing & JOSE CDI  IVF:  LR @ 100ml/hr  ABX:  vanco/zithromax  Mobility:  sba 1  Safety:  pt uses call light bed & chair alarm on    Comments:pt makes needs known    8/8/2019  5:19 AM  JACOB DUKE    "

## 2019-08-08 NOTE — PLAN OF CARE
Pt A&O, pleasant. VSS, afebrile, says he only has pain when he transfers and declines anything for pain.  Rt arm in sling and elevated on pillow, drsg C, D, I.  Abdelrahman drain in place to right shoulder-output as charted.  Wound vac in place.  Unable to assess lungs and partial skin assessment due to pt being in chair, awaiting staff to help to transfer back to bed.  Pt is asst x2.  Uses urinal.  Left knee xray done tonite.  IV Vanco and Zosyn infused, LR infusing at 100 mls/hr.  Pt needs help eating and drinking, arms very weak and unable and significantly impaired.    Alarms on and call light in reach.     Face to face report given with opportunity to observe patient.    Report given to Casandra Barraza   8/7/2019  8:39 PM

## 2019-09-19 DIAGNOSIS — S31.109A OPEN WOUND OF ABDOMINAL WALL, INITIAL ENCOUNTER: Primary | ICD-10-CM

## 2019-09-25 ENCOUNTER — TRANSFERRED RECORDS (OUTPATIENT)
Dept: HEALTH INFORMATION MANAGEMENT | Facility: CLINIC | Age: 79
End: 2019-09-25

## 2019-09-27 ENCOUNTER — OFFICE VISIT (OUTPATIENT)
Dept: WOUND CARE | Facility: OTHER | Age: 79
End: 2019-09-27
Attending: NURSE PRACTITIONER
Payer: COMMERCIAL

## 2019-09-27 VITALS
WEIGHT: 218 LBS | SYSTOLIC BLOOD PRESSURE: 96 MMHG | HEIGHT: 68 IN | BODY MASS INDEX: 33.04 KG/M2 | OXYGEN SATURATION: 98 % | RESPIRATION RATE: 12 BRPM | HEART RATE: 70 BPM | DIASTOLIC BLOOD PRESSURE: 57 MMHG | TEMPERATURE: 97 F

## 2019-09-27 DIAGNOSIS — S31.109A OPEN WOUND OF ABDOMINAL WALL, INITIAL ENCOUNTER: Chronic | ICD-10-CM

## 2019-09-27 PROCEDURE — 97605 NEG PRS WND THER DME<=50SQCM: CPT | Performed by: NURSE PRACTITIONER

## 2019-09-27 PROCEDURE — 99213 OFFICE O/P EST LOW 20 MIN: CPT | Mod: 25 | Performed by: NURSE PRACTITIONER

## 2019-09-27 PROCEDURE — G0463 HOSPITAL OUTPT CLINIC VISIT: HCPCS | Mod: 25

## 2019-09-27 RX ORDER — CARVEDILOL 6.25 MG/1
6.25 TABLET ORAL 2 TIMES DAILY WITH MEALS
COMMUNITY

## 2019-09-27 RX ORDER — DOXYCYCLINE 100 MG/1
100 CAPSULE ORAL 2 TIMES DAILY
COMMUNITY

## 2019-09-27 RX ORDER — POLYETHYLENE GLYCOL 3350 17 G/17G
1 POWDER, FOR SOLUTION ORAL DAILY PRN
COMMUNITY

## 2019-09-27 RX ORDER — SPIRONOLACTONE 25 MG/1
25 TABLET ORAL 2 TIMES DAILY
COMMUNITY

## 2019-09-27 RX ORDER — FLUOXETINE 10 MG/1
10 TABLET, FILM COATED ORAL DAILY
COMMUNITY

## 2019-09-27 RX ORDER — QUETIAPINE FUMARATE 25 MG/1
12.5 TABLET, FILM COATED ORAL AT BEDTIME
COMMUNITY

## 2019-09-27 RX ORDER — LEVOTHYROXINE SODIUM 125 UG/1
125 TABLET ORAL DAILY
COMMUNITY

## 2019-09-27 RX ORDER — POTASSIUM CHLORIDE 1.5 G/1.58G
20 POWDER, FOR SOLUTION ORAL DAILY
COMMUNITY

## 2019-09-27 RX ORDER — ALBUTEROL SULFATE 0.83 MG/ML
2.5 SOLUTION RESPIRATORY (INHALATION)
COMMUNITY

## 2019-09-27 RX ORDER — CALCIUM CARBONATE/VITAMIN D3 500-10/5ML
LIQUID (ML) ORAL DAILY
COMMUNITY

## 2019-09-27 RX ORDER — FUROSEMIDE 40 MG
40 TABLET ORAL 2 TIMES DAILY
COMMUNITY

## 2019-09-27 ASSESSMENT — MIFFLIN-ST. JEOR: SCORE: 1668.34

## 2019-09-27 ASSESSMENT — PAIN SCALES - GENERAL: PAINLEVEL: NO PAIN (0)

## 2019-09-27 NOTE — PROGRESS NOTES
SUBJECTIVE:  Ag Perez, 79 year old, male presents with the following Chief Complaint(s) with HPI to follow:  Chief Complaint   Patient presents with     WOUND CARE     wound vac abdominal wound        Wound care    HPI:  Ag is here today for the reassessment and treatment of abdominal wound.  Back story:  Its very long and complicated.   3/12/19: Ag had a left transverse hemicolectomy at 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    5/30/19: presented to the ED with acute onset of abdominal pain.  He was going to the bathroom when he felt like a pop and developed abdominal pain.   Hospitalized from 5/30/19 to 6/11/19 for small bowel perforation, acute metabolic encephalopathy, metabolic and respiratory acidosis, severe sepsis with septic shock, acute kidney injury.  Lactic acid, up to 6. Procalcitonin, up to 37.13.  Concerns with incarcerated incisional hernia  5/31/19: surgery by Dr. Vilchis.  Post-op diagnosis: fascial dehiscense with small bowel perforation; Procedure:   EXPLORATORY LAPAROTOMY WITH RESECTION OF SMALL BOWEL, JEJUNUM, TEMPORARY ABDOMINAL WALL CLOSURE, SEROSAL REPAIR  5/31/19: Central line placement  6/11/19: [facility] Oma DOWNEY RN CWOCN consulted the patient and per her note--NH facility will not be able to get a wound vac in house until possibly tomorrow so will have to initiate BID NS moist gauze dressings until then.  Ag isn't sure what day when the NPWT was placed  6/27/19: telephone encounter that patient is still at [facility] with a NPWT.  Saw Dr. Vilchis this day, who reports that patient is in the process of being discharge from the facility tomorrow.    6/28/19: telephone call to Wound Care.  Ag to be discharge on Monday with [facility] home care services.    7/5/19: saw myself for his 1st Wound Care appt.  NPWT on held--see note  7/8/19  through 8/2/19: Wound care saw him for his abdominal wound.   8/5/19: seen in the ED for a fever, 102.  He was admitted.  CT showed 3 cm fluid collection on his right shoulder.    8/8/19: he was transferred to [facility] due to multiple septic joints.      I don't have any notes from his hospitalization at [facility]   He doesn't recall when he was discharged  Ag's currently living at [facility] facility.    9/27/19: here today for his 1st wound care appointment, NPWT dressing changed.  Received a referral from Dr. Montanez.    Denies any fevers, chills, and/or malodorous drainage.   NPWT is medella style  PMH:  history of hypertension, atrial fibrillation on anticoagulation; fascial dehiscense with small bowel perforation, former smoker; history of prediabetes  Last A1c  Lab Results   Component Value Date    A1C 6.3 06/01/2019       Patient Active Problem List   Diagnosis     Advance care planning     SBO (small bowel obstruction) (H)     Sepsis (H)     Acute abdominal pain     Chronic atrial fibrillation     Malignant neoplasm of transverse colon (H)     Small bowel perforation (H)     Cardiac pacemaker in situ     Abscess of right shoulder     Gram-positive bacteremia     Open abdominal wall wound     Benign essential hypertension     Gastroesophageal reflux disease with esophagitis       Past Medical History:   Diagnosis Date     Arrhythmia      Hypertension      Pacemaker        Past Surgical History:   Procedure Laterality Date     CARDIAC SURGERY       COLONOSCOPY       COLONOSCOPY N/A 7/24/2015    Procedure: COLONOSCOPY;  Surgeon: Kameron De Santiago MD;  Location: [facility] OR     INCISION AND DRAINAGE SHOULDER, COMBINED Right 8/7/2019    Procedure: INCISION AND DRAINAGE, SHOULDER REGION;  Surgeon: Elver Reeves MD;  Location: [facility] OR     LAPAROTOMY EXPLORATORY N/A 5/31/2019    Procedure: EXPLORATORY LAPAROTOMY WITH RESECTION OF SMALL BOWEL, JEJUNUM, TEMPORARY ABDOMINAL WALL CLOSURE, SEROSAL REPAIR;   Surgeon: Pastor Vilchis MD;  Location: [facility] OR     LAPAROTOMY EXPLORATORY N/A 6/2/2019    Procedure: EXPLORATORY LAPAROTOMY, SMALL BOWEL RESECTION-JEJUNUM, SMALL BOWEL ANASTOMOSIS, ABDOMINAL WASH OUT, FASCIAL CLOSURE;  Surgeon: Pastor Vilchis MD;  Location: [facility] OR       History reviewed. No pertinent family history.    Social History     Tobacco Use     Smoking status: Former Smoker     Packs/day: 0.00     Smokeless tobacco: Never Used   Substance Use Topics     Alcohol use: Not on file       Current Outpatient Medications   Medication Sig Dispense Refill     Acetaminophen 325 MG CAPS Take 650 mg by mouth every 4 hours as needed (for General Discomfort)       albuterol (PROVENTIL) (2.5 MG/3ML) 0.083% neb solution Take 2.5 mg by nebulization every 2 hours as needed for shortness of breath / dyspnea or wheezing       ASPIRIN PO Take 81 mg by mouth daily       atorvastatin (LIPITOR) 10 MG tablet Take 10 mg by mouth every evening       BENAZEPRIL HCL PO Take 40 mg by mouth daily       carvedilol (COREG) 6.25 MG tablet Take 6.25 mg by mouth 2 times daily (with meals)       doxycycline hyclate (VIBRAMYCIN) 100 MG capsule Take 100 mg by mouth 2 times daily       enoxaparin (LOVENOX) 40 MG/0.4ML syringe Inject Subcutaneous daily       Ezetimibe (ZETIA PO) Take 10 mg by mouth At Bedtime       ferrous sulfate (FEROSUL) 325 (65 Fe) MG tablet Take 325 mg by mouth daily (with dinner)       FLUoxetine (PROZAC) 10 MG tablet Take 10 mg by mouth daily       furosemide (LASIX) 40 MG tablet Take 40 mg by mouth 2 times daily       levothyroxine (SYNTHROID/LEVOTHROID) 125 MCG tablet Take 125 mcg by mouth daily       magnesium oxide 400 MG CAPS Take by mouth daily       melatonin 3 MG CAPS Take 2 capsules by mouth At Bedtime       metFORMIN (GLUCOPHAGE) 1000 MG tablet Take 1,000 mg by mouth 2 times daily (with meals)       Multiple Vitamins-Minerals (THERAPEUTIC M) TABS Take 1 tablet by mouth daily       Nitroglycerin (NITROSTAT  "SL) Place 0.4 mg under the tongue every 5 minutes as needed for chest pain       Omega-3 Fatty Acids (OMEGA-3 FISH OIL PO) Take 1 g by mouth 2 times daily (with meals)        polyethylene glycol (MIRALAX/GLYCOLAX) packet Take 1 packet by mouth daily as needed for constipation       potassium chloride (KLOR-CON) 20 MEQ packet Take 20 mEq by mouth daily       QUEtiapine (SEROQUEL) 25 MG tablet Take 12.5 mg by mouth At Bedtime       spironolactone (ALDACTONE) 25 MG tablet Take 25 mg by mouth 2 times daily       tamsulosin (FLOMAX) 0.4 MG capsule Take 0.4 mg by mouth every evening         Allergies   Allergen Reactions     Crestor [Rosuvastatin]      Rivaroxaban Other (See Comments)     Bleed       REVIEW OF SYSTEMS  Skin: as noted above  Eyes: negative  Ears/Nose/Throat: negative  Respiratory: No shortness of breath, dyspnea on exertion, cough, or hemoptysis  Cardiovascular: negative; history of atrial fibrillation  Gastrointestinal: as noted above; negative  Genitourinary: negative  Musculoskeletal: negative  Neurologic: negative  Psychiatric: negative  Hematologic/Lymphatic/Immunologic: negative  Endocrine: positive for prediabetes    OBJECTIVE:  BP 96/57 (BP Location: Left arm, Patient Position: Sitting, Cuff Size: Adult Regular)   Pulse 70   Temp 97  F (36.1  C) (Tympanic)   Resp 12   Ht 1.727 m (5' 8\")   Wt 98.9 kg (218 lb)   SpO2 98%   BMI 33.15 kg/m    Constitutional: alert and no distress  Cardiovascular: irregular rhythm, regular rate. No murmurs, clicks gallops or rub  Respiratory:  Good diaphragmatic excursion. Lungs clear  Gastrointestinal: Abdomen soft, non-tender.   Skin:   Wound description: Type of Wound- post-surgical; Location- abdominal--midline, Drainage amount-scant, Drainage color- serosanguinous, Odor- no; Wound bed-see picture--100% pale red (after removal of the transfer gauze), Surrounding skin- healing scar tissues, no erythema, no lymphangitis  Measurements: 5 x 2.1 x 0.6 cm (@ 0600), "   Tunnels @ 11: 2.2 cm  Undermines from 0300 to 0900: 1.6 cm (deepest at 0600)  Dressing change:   Removed old NPWT dressing.   Cleansed with soap and water, dried    Bordered wound with duoderm CGF  Applied 3 pieces of black foam and 1 disc  Draped  NPWT restarted--confirmed settings and no leak     Psychiatric: mentation appears normal for baseline--mildly confused and affect normal/bright      LABS  Results for orders placed or performed during the hospital encounter of 08/05/19   XR Chest Port 1 View    Narrative    Procedure:XR CHEST PORT 1 VW    Clinical history:Male, 79 years, fever    Technique: Single view was obtained.    Comparison: 6/2/2019    Findings: The cardiac silhouette is normal. The pulmonary vasculature  is normal.    The lungs demonstrate linear scarring versus atelectasis at the left  lung base, otherwise are clear. Bony structures are unremarkable.      Impression    Impression:   Left basilar atelectasis versus scarring. No evidence of focal  pneumonia.    RUSSEL CURIEL MD   CT Chest/Abdomen/Pelvis w Contrast    Addendum: 8/7/2019    An ultrasound-guided aspiration of the right shoulder as well as a  fluid collection in the region of the left biceps was performed  yielding purulent appearing fluid and lab results suggesting abscess.    Upon further review of the imaging, there is enlargement and  heterogeneous enhancement of the left biceps muscle as well as a  similar probable involvement of the left subscapularis suggesting  presence of abscess. See series 4 images 22 through 27, series 5 image  33. A similar pattern of involvement in the right subscapularis is  present.    STACIE RAMIREZ MD      Narrative    CT CHEST/ABDOMEN/PELVIS W CONTRAST    HISTORY: 79 yearsMale with sepsis    TECHNIQUE: Axial CT imaging of the chest abdomen and pelvis was  performed with contrast. Coronal and sagittal reconstructions were  obtained.    COMPARISON: CT abdomen pelvis of  5/30/2019    FINDINGS:    CT CHEST: There is an abnormal fluid collection within the anterior  right shoulder, along the anterior surface of the deltoid and upper  margin of the pectoralis. A small amount of air is seen within this  collection of fluid. This collection of fluid measures 2.2 x 2.7 x 3.2  cm. There is also fluid in the right glenohumeral joint space and  subdeltoid subacromial bursa.  There is no mediastinal or hilar or axillary lymphadenopathy.    The lungs are clear. No consolidating airspace opacities are present.  No concerning pulmonary nodules or masses are present         No concerning osseous lesions are identified    IMPRESSION CHEST: Abnormal collection of fluid anterior to the right  shoulder. Consider abscess. If a recent injection was performed, this  may be sequela of that. There is also a sizable glenohumeral and  subdeltoid subacromial bursal effusion.  CT ABDOMEN AND PELVIS:    Liver: There is normal enhancement of the hepatic parenchyma.    Gallbladder: Surgically absent.    Spleen: Unremarkable    Pancreas: Unremarkable    Adrenals: Unremarkable    Kidneys: Small bilateral renal cysts are present. There is no  hydronephrosis.    Bowel: No abnormally distended or thickened loops of bowel present.  There is a surgical midline laparotomy wound. There has been interval  repair of an anterior abdominal wall hernia as seen on the prior  study.    Lymph nodes: There is no evidence of mass or lymphadenopathy.    PELVIS: There is no evidence of mass lymphadenopathy or abnormal fluid  collection.    There is bilateral spondylolysis of L5 with grade 1 to grade 2  anterolisthesis of L5 in relation S1. There is osteopenia and  multilevel degenerative change of the lumbar spine.    IMPRESSION ABDOMEN AND PELVIS: Interval surgical repair of anterior  bowel wall hernia as seen on the prior study. There are no acute  changes otherwise.    STACIE RAMIREZ MD   US Joint Injection Aspiration  Intermediate Bilateral    Narrative    Exam:US JOINT INJECTION ASPIRATION INTERMEDIATE BILATERAL.    History:79 years Male large complex fluid collection    Comparison:  CT scan chest abdomen pelvis 8/5/2019 and ultrasound of  the left upper extremity 8/5/2019.    Technique: After informed consent was obtained, a timeout was  performed, satisfactorily identifying the patient and the site of the  procedure.    Ultrasound evaluation was performed, demonstrating complex fluid  collection of the right shoulder and another complex fluid collection  in the region of the left axilla .    The patient was then placed in the supine  position and prepped and  draped in usual sterile fashion. 4 mL of 1% lidocaine was then  instilled. An 18-gauge spinal needle was advanced under fluoroscopic  guidance into the distended shoulder joint and adjacent fluid  collections. A total of 26 mL of purulent material was obtained and  sent for analysis.    The patient's left shoulder was then prepped and draped in usual  sterile fashion. A a 22-gauge spinal needle  was then positioned using  ultrasound guidance. Frankly purulent material was obtained from the  complex fluid collection in the left axilla. A total of 16 mL was  obtained in this location.    Specimens were prepared and sent for analysis.           Impression    Impression:  1.  Technically successful ultrasound guided  aspiration of complex  fluid collection of the right shoulder and another complex fluid  collection in the left axilla .    2.  26 mL of frankly purulent material was obtained from the right  shoulder and 16 mL of purulent material was obtained from the left  axillary fluid collection.    3.  No acute complication. Follow-up is pending.    RUSSEL CURIEL MD   XR Knee Port Left 1/2 Views    Narrative    XR KNEE PORT LT 1/2 VW    HISTORY: 79 years Male pain, known joint infection bilateral shoulders  with bacteremia    COMPARISON: 4/7/2008    TECHNIQUE: 2 views  left knee    FINDINGS: There is diffuse subcutaneous edema. There has been interval  removal of the patella. There is a small ossicle or calcification at  the expected location of the patella.    There is a small to moderate-sized joint effusion.    There is mild medial compartment joint space narrowing.      Impression    IMPRESSION: Diffuse subcutaneous edema with small to moderate-sized  joint effusion.    The patella is now absent. A small ossicle at the patellar that is now  present.    STACIE RAMIREZ MD   CBC with platelets differential   Result Value Ref Range    WBC 13.0 (H) 4.0 - 11.0 10e9/L    RBC Count 3.10 (L) 4.4 - 5.9 10e12/L    Hemoglobin 8.8 (L) 13.3 - 17.7 g/dL    Hematocrit 26.3 (L) 40.0 - 53.0 %    MCV 85 78 - 100 fl    MCH 28.4 26.5 - 33.0 pg    MCHC 33.5 31.5 - 36.5 g/dL    RDW 16.5 (H) 10.0 - 15.0 %    Platelet Count 295 150 - 450 10e9/L    Diff Method Automated Method     % Neutrophils 92.2 %    % Lymphocytes 3.3 %    % Monocytes 3.7 %    % Eosinophils 0.0 %    % Basophils 0.1 %    % Immature Granulocytes 0.7 %    Nucleated RBCs 0 0 /100    Absolute Neutrophil 12.0 (H) 1.6 - 8.3 10e9/L    Absolute Lymphocytes 0.4 (L) 0.8 - 5.3 10e9/L    Absolute Monocytes 0.5 0.0 - 1.3 10e9/L    Absolute Eosinophils 0.0 0.0 - 0.7 10e9/L    Absolute Basophils 0.0 0.0 - 0.2 10e9/L    Abs Immature Granulocytes 0.1 0 - 0.4 10e9/L    Absolute Nucleated RBC 0.0    Comprehensive metabolic panel   Result Value Ref Range    Sodium 135 133 - 144 mmol/L    Potassium 3.4 3.4 - 5.3 mmol/L    Chloride 102 94 - 109 mmol/L    Carbon Dioxide 26 20 - 32 mmol/L    Anion Gap 7 3 - 14 mmol/L    Glucose 126 (H) 70 - 99 mg/dL    Urea Nitrogen 24 7 - 30 mg/dL    Creatinine 0.81 0.66 - 1.25 mg/dL    GFR Estimate 83 >60 mL/min/[1.73_m2]    GFR Estimate If Black >90 >60 mL/min/[1.73_m2]    Calcium 8.3 (L) 8.5 - 10.1 mg/dL    Bilirubin Total 1.5 (H) 0.2 - 1.3 mg/dL    Albumin 1.6 (L) 3.4 - 5.0 g/dL    Protein Total 6.1 (L) 6.8 - 8.8  g/dL    Alkaline Phosphatase 147 40 - 150 U/L    ALT 67 0 - 70 U/L    AST 44 0 - 45 U/L   UA with Microscopic reflex to Culture   Result Value Ref Range    Color Urine Yellow     Appearance Urine Clear     Glucose Urine Negative NEG^Negative mg/dL    Bilirubin Urine Negative NEG^Negative    Ketones Urine 10 (A) NEG^Negative mg/dL    Specific Gravity Urine >1.050 (H) 1.003 - 1.035    Blood Urine Trace (A) NEG^Negative    pH Urine 6.5 4.7 - 8.0 pH    Protein Albumin Urine 30 (A) NEG^Negative mg/dL    Urobilinogen mg/dL 4.0 (H) 0.0 - 2.0 mg/dL    Nitrite Urine Negative NEG^Negative    Leukocyte Esterase Urine Negative NEG^Negative    Source Midstream Urine     WBC Urine 3 0 - 5 /HPF    RBC Urine 11 (H) 0 - 2 /HPF    Bacteria Urine None (A) NEG^Negative /HPF    Mucous Urine Present (A) NEG^Negative /LPF    Hyaline Casts 1 (A) OTO2^O - 2 /LPF   Lipase   Result Value Ref Range    Lipase 52 (L) 73 - 393 U/L   Lactic acid whole blood   Result Value Ref Range    Lactic Acid 1.5 0.7 - 2.0 mmol/L   CRP inflammation   Result Value Ref Range    CRP Inflammation 197.0 (H) 0.0 - 8.0 mg/L   Comprehensive metabolic panel   Result Value Ref Range    Sodium 136 133 - 144 mmol/L    Potassium 3.3 (L) 3.4 - 5.3 mmol/L    Chloride 104 94 - 109 mmol/L    Carbon Dioxide 26 20 - 32 mmol/L    Anion Gap 6 3 - 14 mmol/L    Glucose 98 70 - 99 mg/dL    Urea Nitrogen 20 7 - 30 mg/dL    Creatinine 0.67 0.66 - 1.25 mg/dL    GFR Estimate >90 >60 mL/min/[1.73_m2]    GFR Estimate If Black >90 >60 mL/min/[1.73_m2]    Calcium 8.0 (L) 8.5 - 10.1 mg/dL    Bilirubin Total 1.0 0.2 - 1.3 mg/dL    Albumin 1.5 (L) 3.4 - 5.0 g/dL    Protein Total 5.8 (L) 6.8 - 8.8 g/dL    Alkaline Phosphatase 131 40 - 150 U/L    ALT 54 0 - 70 U/L    AST 32 0 - 45 U/L   CBC with platelets differential   Result Value Ref Range    WBC 13.5 (H) 4.0 - 11.0 10e9/L    RBC Count 2.95 (L) 4.4 - 5.9 10e12/L    Hemoglobin 8.3 (L) 13.3 - 17.7 g/dL    Hematocrit 25.3 (L) 40.0 - 53.0 %    MCV  86 78 - 100 fl    MCH 28.1 26.5 - 33.0 pg    MCHC 32.8 31.5 - 36.5 g/dL    RDW 16.9 (H) 10.0 - 15.0 %    Platelet Count 275 150 - 450 10e9/L    Diff Method Automated Method     % Neutrophils 88.1 %    % Lymphocytes 6.5 %    % Monocytes 4.5 %    % Eosinophils 0.1 %    % Basophils 0.1 %    % Immature Granulocytes 0.7 %    Nucleated RBCs 0 0 /100    Absolute Neutrophil 11.9 (H) 1.6 - 8.3 10e9/L    Absolute Lymphocytes 0.9 0.8 - 5.3 10e9/L    Absolute Monocytes 0.6 0.0 - 1.3 10e9/L    Absolute Eosinophils 0.0 0.0 - 0.7 10e9/L    Absolute Basophils 0.0 0.0 - 0.2 10e9/L    Abs Immature Granulocytes 0.1 0 - 0.4 10e9/L    Absolute Nucleated RBC 0.0    Lactic acid whole blood   Result Value Ref Range    Lactic Acid 0.9 0.7 - 2.0 mmol/L   Basic metabolic panel   Result Value Ref Range    Sodium 135 133 - 144 mmol/L    Potassium 3.6 3.4 - 5.3 mmol/L    Chloride 103 94 - 109 mmol/L    Carbon Dioxide 26 20 - 32 mmol/L    Anion Gap 6 3 - 14 mmol/L    Glucose 113 (H) 70 - 99 mg/dL    Urea Nitrogen 24 7 - 30 mg/dL    Creatinine 0.76 0.66 - 1.25 mg/dL    GFR Estimate 85 >60 mL/min/[1.73_m2]    GFR Estimate If Black >90 >60 mL/min/[1.73_m2]    Calcium 8.1 (L) 8.5 - 10.1 mg/dL   CBC with platelets differential   Result Value Ref Range    WBC 14.4 (H) 4.0 - 11.0 10e9/L    RBC Count 3.20 (L) 4.4 - 5.9 10e12/L    Hemoglobin 9.1 (L) 13.3 - 17.7 g/dL    Hematocrit 27.8 (L) 40.0 - 53.0 %    MCV 87 78 - 100 fl    MCH 28.4 26.5 - 33.0 pg    MCHC 32.7 31.5 - 36.5 g/dL    RDW 17.0 (H) 10.0 - 15.0 %    Platelet Count 318 150 - 450 10e9/L    Diff Method Automated Method     % Neutrophils 87.2 %    % Lymphocytes 7.1 %    % Monocytes 4.4 %    % Eosinophils 0.4 %    % Basophils 0.1 %    % Immature Granulocytes 0.8 %    Nucleated RBCs 0 0 /100    Absolute Neutrophil 12.6 (H) 1.6 - 8.3 10e9/L    Absolute Lymphocytes 1.0 0.8 - 5.3 10e9/L    Absolute Monocytes 0.6 0.0 - 1.3 10e9/L    Absolute Eosinophils 0.1 0.0 - 0.7 10e9/L    Absolute Basophils 0.0  0.0 - 0.2 10e9/L    Abs Immature Granulocytes 0.1 0 - 0.4 10e9/L    Absolute Nucleated RBC 0.0    CRP inflammation   Result Value Ref Range    CRP Inflammation 226.0 (H) 0.0 - 8.0 mg/L   Lactic acid whole blood   Result Value Ref Range    Lactic Acid 1.4 0.7 - 2.0 mmol/L   Vancomycin level   Result Value Ref Range    Vancomycin Level 18.4 mg/L   CBC with platelets   Result Value Ref Range    WBC 11.3 (H) 4.0 - 11.0 10e9/L    RBC Count 2.94 (L) 4.4 - 5.9 10e12/L    Hemoglobin 8.2 (L) 13.3 - 17.7 g/dL    Hematocrit 26.1 (L) 40.0 - 53.0 %    MCV 89 78 - 100 fl    MCH 27.9 26.5 - 33.0 pg    MCHC 31.4 (L) 31.5 - 36.5 g/dL    RDW 16.8 (H) 10.0 - 15.0 %    Platelet Count 270 150 - 450 10e9/L   Basic metabolic panel   Result Value Ref Range    Sodium 135 133 - 144 mmol/L    Potassium 4.4 3.4 - 5.3 mmol/L    Chloride 105 94 - 109 mmol/L    Carbon Dioxide 26 20 - 32 mmol/L    Anion Gap 4 3 - 14 mmol/L    Glucose 214 (H) 70 - 99 mg/dL    Urea Nitrogen 29 7 - 30 mg/dL    Creatinine 0.84 0.66 - 1.25 mg/dL    GFR Estimate 82 >60 mL/min/[1.73_m2]    GFR Estimate If Black >90 >60 mL/min/[1.73_m2]    Calcium 7.8 (L) 8.5 - 10.1 mg/dL     *Note: Due to a large number of results and/or encounters for the requested time period, some results have not been displayed. A complete set of results can be found in Results Review.       ASSESSMENT / PLAN:  (S31.109A) Open wound of abdominal wall, initial encounter  Comment: noted  Plan:   Continue NPWT three times a week  Will see him weekly    Added duoderm CGF to protect edges  Encouraged protein shake    Treatment goal;  Moisture balance  Prevent infection    Patient Instructions   Continue with Monday, Wednesday, and Friday NP[facility] dressing changes at [facility].      New Orders:  1.  Stop contact layer  2.  Add duoderm CGF to protect wound borders (they were macerated today)  3.  Add 1 protein drink daily  4.  Please send NPWT dressing supplies and cannister--we don't carry [facility]  supplies. Thanks    Today's wound dressing information  Wound description: Type of Wound- post-surgical; Location- abdominal area, Drainage amount-scant, Drainage color-serous, Odor- none; Wound bed- pale pink, Surrounding skin-slightly macerated.  Measurements: 5 x 2.1 x 0.6 (measured at 0600)  Tunnel @ 1100: 2.2  Undermined from [id number]: deepest @ 0600: 1.6 cm  Dressing change: Wound cleansed with soap and water, dried, wound bordered with duoderm CGF, dressed with 3 black foam in wound base (tucked small amount of foam in tunnel and undermined area), 1 black foam for disc landing. Draped accordingly.  NPWT started.  No leaking.  Confirmed setting prior to departure.       Please call if any questions/concerns and/or problems ([phone])    Follow up this   One week                    Time: 45 minutes  Barrier: see PMH  Willingness to learn: accepting    Marissa ABDI FNP-BC  Disease Management    Cc: Dr. Montanez

## 2019-09-27 NOTE — NURSING NOTE
"Chief Complaint   Patient presents with     WOUND CARE     wound vac abdominal wound       Initial BP 96/57 (BP Location: Left arm, Patient Position: Sitting, Cuff Size: Adult Regular)   Pulse 70   Temp 97  F (36.1  C) (Tympanic)   Resp 12   Ht 1.727 m (5' 8\")   Wt 98.9 kg (218 lb)   SpO2 98%   BMI 33.15 kg/m   Estimated body mass index is 33.15 kg/m  as calculated from the following:    Height as of this encounter: 1.727 m (5' 8\").    Weight as of this encounter: 98.9 kg (218 lb).  Medication Reconciliation: complete  Tanisha Bateman LPN  "

## 2019-09-27 NOTE — PATIENT INSTRUCTIONS
Continue with Monday, Wednesday, and Friday NP[facility] dressing changes at [facility].      New Orders:  1.  Stop contact layer  2.  Add duoderm CGF to protect wound borders (they were macerated today)  3.  Add 1 protein drink daily  4.  Please send NPWT dressing supplies and cannister--we don't carry [place] supplies. Thanks    Today's wound dressing information  Wound description: Type of Wound- post-surgical; Location- abdominal area, Drainage amount-scant, Drainage color-serous, Odor- none; Wound bed- pale pink, Surrounding skin-slightly macerated.  Measurements: 5 x 2.1 x 0.6 (measured at 0600)  Tunnel @ 1100: 2.2  Undermined from 6114-8265: deepest @ 0600: 1.6 cm  Dressing change: Wound cleansed with soap and water, dried, wound bordered with duoderm CGF, dressed with 3 black foam in wound base (tucked small amount of foam in tunnel and undermined area), 1 black foam for disc landing. Draped accordingly.  NPWT started.  No leaking.  Confirmed setting prior to departure.       Please call if any questions/concerns and/or problems ([phone])    Follow up this   One week

## 2019-10-13 ENCOUNTER — APPOINTMENT (OUTPATIENT)
Dept: CT IMAGING | Facility: HOSPITAL | Age: 79
End: 2019-10-13
Attending: FAMILY MEDICINE
Payer: MEDICARE

## 2019-10-13 ENCOUNTER — APPOINTMENT (OUTPATIENT)
Dept: GENERAL RADIOLOGY | Facility: HOSPITAL | Age: 79
End: 2019-10-13
Attending: FAMILY MEDICINE
Payer: MEDICARE

## 2019-10-13 ENCOUNTER — TRANSFERRED RECORDS (OUTPATIENT)
Dept: HEALTH INFORMATION MANAGEMENT | Facility: CLINIC | Age: 79
End: 2019-10-13

## 2019-10-13 ENCOUNTER — HOSPITAL ENCOUNTER (EMERGENCY)
Facility: HOSPITAL | Age: 79
Discharge: ANOTHER HOSPITAL | End: 2019-10-13
Attending: FAMILY MEDICINE | Admitting: FAMILY MEDICINE
Payer: MEDICARE

## 2019-10-13 VITALS
OXYGEN SATURATION: 97 % | SYSTOLIC BLOOD PRESSURE: 153 MMHG | DIASTOLIC BLOOD PRESSURE: 109 MMHG | RESPIRATION RATE: 16 BRPM | HEART RATE: 69 BPM | TEMPERATURE: 97.2 F

## 2019-10-13 DIAGNOSIS — N17.9 ACUTE RENAL FAILURE, UNSPECIFIED ACUTE RENAL FAILURE TYPE (H): ICD-10-CM

## 2019-10-13 DIAGNOSIS — S80.01XA CONTUSION OF RIGHT KNEE, INITIAL ENCOUNTER: ICD-10-CM

## 2019-10-13 DIAGNOSIS — S00.83XA CONTUSION OF FOREHEAD, INITIAL ENCOUNTER: ICD-10-CM

## 2019-10-13 DIAGNOSIS — R29.6 UNWITNESSED FALL: Primary | ICD-10-CM

## 2019-10-13 DIAGNOSIS — S40.011A CONTUSION OF RIGHT SHOULDER, INITIAL ENCOUNTER: ICD-10-CM

## 2019-10-13 LAB
ALBUMIN SERPL-MCNC: 3.1 G/DL (ref 3.4–5)
ALBUMIN UR-MCNC: 10 MG/DL
ALP SERPL-CCNC: 120 U/L (ref 40–150)
ALT SERPL W P-5'-P-CCNC: 22 U/L (ref 0–70)
ANION GAP SERPL CALCULATED.3IONS-SCNC: 11 MMOL/L (ref 3–14)
APPEARANCE UR: CLEAR
AST SERPL W P-5'-P-CCNC: 21 U/L (ref 0–45)
BACTERIA #/AREA URNS HPF: ABNORMAL /HPF
BASOPHILS # BLD AUTO: 0 10E9/L (ref 0–0.2)
BASOPHILS NFR BLD AUTO: 0.1 %
BILIRUB SERPL-MCNC: 1.1 MG/DL (ref 0.2–1.3)
BILIRUB UR QL STRIP: NEGATIVE
BUN SERPL-MCNC: 90 MG/DL (ref 7–30)
CALCIUM SERPL-MCNC: 9 MG/DL (ref 8.5–10.1)
CHLORIDE SERPL-SCNC: 97 MMOL/L (ref 94–109)
CK SERPL-CCNC: 68 U/L (ref 30–300)
CO2 SERPL-SCNC: 22 MMOL/L (ref 20–32)
COLOR UR AUTO: ABNORMAL
CREAT SERPL-MCNC: 4.05 MG/DL (ref 0.66–1.25)
DIFFERENTIAL METHOD BLD: ABNORMAL
EOSINOPHIL # BLD AUTO: 0 10E9/L (ref 0–0.7)
EOSINOPHIL NFR BLD AUTO: 0.1 %
ERYTHROCYTE [DISTWIDTH] IN BLOOD BY AUTOMATED COUNT: 18.2 % (ref 10–15)
GFR SERPL CREATININE-BSD FRML MDRD: 13 ML/MIN/{1.73_M2}
GLUCOSE SERPL-MCNC: 154 MG/DL (ref 70–99)
GLUCOSE UR STRIP-MCNC: NEGATIVE MG/DL
HCT VFR BLD AUTO: 33.6 % (ref 40–53)
HGB BLD-MCNC: 11.2 G/DL (ref 13.3–17.7)
HGB UR QL STRIP: ABNORMAL
HYALINE CASTS #/AREA URNS LPF: 5 /LPF
IMM GRANULOCYTES # BLD: 0.1 10E9/L (ref 0–0.4)
IMM GRANULOCYTES NFR BLD: 0.7 %
INR PPP: 1.1 (ref 0.8–1.2)
KETONES UR STRIP-MCNC: NEGATIVE MG/DL
LACTATE BLD-SCNC: 2.6 MMOL/L (ref 0.7–2)
LEUKOCYTE ESTERASE UR QL STRIP: NEGATIVE
LYMPHOCYTES # BLD AUTO: 3.1 10E9/L (ref 0.8–5.3)
LYMPHOCYTES NFR BLD AUTO: 25.9 %
MCH RBC QN AUTO: 27.9 PG (ref 26.5–33)
MCHC RBC AUTO-ENTMCNC: 33.3 G/DL (ref 31.5–36.5)
MCV RBC AUTO: 84 FL (ref 78–100)
MONOCYTES # BLD AUTO: 0.5 10E9/L (ref 0–1.3)
MONOCYTES NFR BLD AUTO: 4.1 %
NEUTROPHILS # BLD AUTO: 8.3 10E9/L (ref 1.6–8.3)
NEUTROPHILS NFR BLD AUTO: 69.1 %
NITRATE UR QL: NEGATIVE
NRBC # BLD AUTO: 0 10*3/UL
NRBC BLD AUTO-RTO: 0 /100
PH UR STRIP: 6.5 PH (ref 4.7–8)
PLATELET # BLD AUTO: 350 10E9/L (ref 150–450)
POTASSIUM SERPL-SCNC: 5.3 MMOL/L (ref 3.4–5.3)
PROT SERPL-MCNC: 8 G/DL (ref 6.8–8.8)
RBC # BLD AUTO: 4.02 10E12/L (ref 4.4–5.9)
RBC #/AREA URNS AUTO: 52 /HPF (ref 0–2)
SODIUM SERPL-SCNC: 130 MMOL/L (ref 133–144)
SOURCE: ABNORMAL
SP GR UR STRIP: 1.01 (ref 1–1.03)
TROPONIN I SERPL-MCNC: <0.015 UG/L (ref 0–0.04)
UROBILINOGEN UR STRIP-MCNC: NORMAL MG/DL (ref 0–2)
WBC # BLD AUTO: 12.1 10E9/L (ref 4–11)
WBC #/AREA URNS AUTO: 1 /HPF (ref 0–5)

## 2019-10-13 PROCEDURE — 87040 BLOOD CULTURE FOR BACTERIA: CPT | Performed by: FAMILY MEDICINE

## 2019-10-13 PROCEDURE — 70450 CT HEAD/BRAIN W/O DYE: CPT | Mod: TC

## 2019-10-13 PROCEDURE — 72125 CT NECK SPINE W/O DYE: CPT | Mod: TC

## 2019-10-13 PROCEDURE — 81001 URINALYSIS AUTO W/SCOPE: CPT | Performed by: FAMILY MEDICINE

## 2019-10-13 PROCEDURE — 80053 COMPREHEN METABOLIC PANEL: CPT | Performed by: FAMILY MEDICINE

## 2019-10-13 PROCEDURE — 96374 THER/PROPH/DIAG INJ IV PUSH: CPT

## 2019-10-13 PROCEDURE — 85610 PROTHROMBIN TIME: CPT | Performed by: FAMILY MEDICINE

## 2019-10-13 PROCEDURE — 82550 ASSAY OF CK (CPK): CPT | Performed by: FAMILY MEDICINE

## 2019-10-13 PROCEDURE — 85025 COMPLETE CBC W/AUTO DIFF WBC: CPT | Performed by: FAMILY MEDICINE

## 2019-10-13 PROCEDURE — 99285 EMERGENCY DEPT VISIT HI MDM: CPT | Mod: Z6 | Performed by: FAMILY MEDICINE

## 2019-10-13 PROCEDURE — 93005 ELECTROCARDIOGRAM TRACING: CPT

## 2019-10-13 PROCEDURE — 73030 X-RAY EXAM OF SHOULDER: CPT | Mod: TC,RT

## 2019-10-13 PROCEDURE — 25000128 H RX IP 250 OP 636: Performed by: FAMILY MEDICINE

## 2019-10-13 PROCEDURE — 84484 ASSAY OF TROPONIN QUANT: CPT | Performed by: FAMILY MEDICINE

## 2019-10-13 PROCEDURE — 93010 ELECTROCARDIOGRAM REPORT: CPT | Performed by: INTERNAL MEDICINE

## 2019-10-13 PROCEDURE — 36415 COLL VENOUS BLD VENIPUNCTURE: CPT | Performed by: FAMILY MEDICINE

## 2019-10-13 PROCEDURE — 73562 X-RAY EXAM OF KNEE 3: CPT | Mod: TC,RT

## 2019-10-13 PROCEDURE — 96361 HYDRATE IV INFUSION ADD-ON: CPT

## 2019-10-13 PROCEDURE — 83605 ASSAY OF LACTIC ACID: CPT | Performed by: FAMILY MEDICINE

## 2019-10-13 PROCEDURE — 25000132 ZZH RX MED GY IP 250 OP 250 PS 637: Mod: GY | Performed by: FAMILY MEDICINE

## 2019-10-13 PROCEDURE — 99285 EMERGENCY DEPT VISIT HI MDM: CPT | Mod: 25

## 2019-10-13 RX ORDER — CALCIUM GLUCONATE 94 MG/ML
1 INJECTION, SOLUTION INTRAVENOUS ONCE
Status: COMPLETED | OUTPATIENT
Start: 2019-10-13 | End: 2019-10-13

## 2019-10-13 RX ORDER — SODIUM POLYSTYRENE SULFONATE 15 G/60ML
30 SUSPENSION ORAL; RECTAL ONCE
Status: COMPLETED | OUTPATIENT
Start: 2019-10-13 | End: 2019-10-13

## 2019-10-13 RX ORDER — SODIUM CHLORIDE 9 MG/ML
1000 INJECTION, SOLUTION INTRAVENOUS CONTINUOUS
Status: DISCONTINUED | OUTPATIENT
Start: 2019-10-13 | End: 2019-10-13 | Stop reason: HOSPADM

## 2019-10-13 RX ADMIN — SODIUM POLYSTYRENE SULFONATE 30 G: 15 SUSPENSION ORAL; RECTAL at 15:57

## 2019-10-13 RX ADMIN — CALCIUM GLUCONATE 1 G: 98 INJECTION, SOLUTION INTRAVENOUS at 15:33

## 2019-10-13 RX ADMIN — SODIUM CHLORIDE 1000 ML: 9 INJECTION, SOLUTION INTRAVENOUS at 15:18

## 2019-10-13 ASSESSMENT — ENCOUNTER SYMPTOMS
ABDOMINAL PAIN: 0
EYE REDNESS: 0
NECK STIFFNESS: 0
HEADACHES: 0
DIFFICULTY URINATING: 0
SHORTNESS OF BREATH: 0
ARTHRALGIAS: 0
CONFUSION: 0
COLOR CHANGE: 0
FEVER: 0

## 2019-10-13 NOTE — ED NOTES
Pt discharged at this time with EMS. Report called to PARISA Mckeon at [facility] on 7W. Pt was unable to take full dose of Kayexalate as ordered, see MAR. Still awaiting call back from pt's son to update him of pt's transfer. Belongings including wound vac were sent with patient.

## 2019-10-13 NOTE — ED NOTES
DATE:  10/13/2019   TIME OF RECEIPT FROM LAB:  5236  LAB TEST:  Lactic acid  LAB VALUE:  2.6  RESULTS GIVEN WITH READ-BACK TO (PROVIDER):  Ashu Rivera MD  TIME LAB VALUE REPORTED TO PROVIDER:   4552

## 2019-10-13 NOTE — ED NOTES
Pt here via EMS after a fall at home. Pt states that he was recently discharged from the nursing home. Is unsure of which one. Pt is alert upon arrival. Answers questions appropriately but is noted to be occasionally forgetful. Pt states that he was at home trying to get dressed this morning when he tripped over the tubing for his wound vac and fell against a door. Pt states that he was unable to get himself off of the floor and was not able to reach the phone so he laid on the floor for an unknown amount of time. He guesses around 2-3 hours. Pt's cousin then stopped by and found him on floor and contacted EMS. Pt has large abrasion noted to L side of forehead with significant swelling noted to L periorbital area. Pupils are equal and reactive. Pt denies any vision loss or vision changes to L eye. Pt c/o pain to bilateral shoulders and bilateral knees as well. Bruising and abrasions noted to bilateral knees. Pt is unsure of home medications, unable to complete med rec. Wound vac got ripped off of abdominal dressing during pt's fall, wound noted to abdomen with foam/tagaderm in place.

## 2019-10-13 NOTE — ED NOTES
Attempted to contact pt's son per phone number that is listed in chart. No answer, message left requesting that he return phone call. Pt informed of transfer to [facility] and is in agreement. Pt requests that his cousin Rosa be contacted and updated of his condition and of his transfer to [place]. Pt is unsure of Rosa's phone number. Attempted to find Rosa in phone book and was unsuccessful.

## 2019-10-13 NOTE — ED PROVIDER NOTES
History     Chief Complaint   Patient presents with     Fall     reports underwear fell around his legs due to being too big after weight loss and he fell and hit the left side of his face on the counter     HPI  Ag Perez is a 79 year old man with history of atrial fibrillation, not currently anticoagulated, and invasive poorly differentiated colonic adenocarcinoma (refused chemotherapy) s/p left transverse hemicolectomy at [facility] (3/12/19). His surgery was complicated by small bowel performation with septic shock (5/30 - 6/11/19) s/p small bowel resection with temporary abdominal wall closure. The patient was transferred to [facility] on 6/11 - 6/28/19) and has been under the care of the [facility] for his wound vacuum. He has lived at home with the help of [facility] home care services, but was readmitted with report of multiple septic joints and was transferred to [facility] on 8/8/19 for further care. Patient has continued to live alone following his discharge.    The patient presents today by EMS after being found down by his cousin. The patient is not sure when he fell, but notes that it occurred when he was getting up to go to the bathroom when his shorts fell around his feet and he tripped, falling forward and striking the left side of his face on a desk and then fall to his right side, striking his right shoulder and his right knee. His wound vacuum was knocked off during the fall.    Allergies:  Allergies   Allergen Reactions     Crestor [Rosuvastatin]      Rivaroxaban Other (See Comments)     Bleed       Problem List:    Patient Active Problem List    Diagnosis Date Noted     Abscess of right shoulder 08/06/2019     Priority: Medium     Gram-positive bacteremia 08/06/2019     Priority: Medium     Open abdominal wall wound 08/06/2019     Priority: Medium     Benign essential hypertension 08/06/2019     Priority: Medium     Gastroesophageal reflux  disease with esophagitis 08/06/2019     Priority: Medium     SBO (small bowel obstruction) (H) 05/31/2019     Priority: Medium     Sepsis (H) 05/31/2019     Priority: Medium     Acute abdominal pain 05/31/2019     Priority: Medium     Chronic atrial fibrillation 05/31/2019     Priority: Medium     Small bowel perforation (H) 05/31/2019     Priority: Medium     Cardiac pacemaker in situ 05/31/2019     Priority: Medium     Malignant neoplasm of transverse colon (H) 04/16/2019     Priority: Medium     Advance care planning 08/11/2016     Priority: Medium     Advance Care Planning 8/11/2016: Receipt of ACP document:  Received: Resuscitation Guidelines order which was signed and dated by provider on 6/2/16.  Document previously scanned on 6/3/16.  Order reviewed and found to be valid.  Code Status needs to be updated to reflect choices in most recent ACP document: DNR. Confirmed/documented designated decision maker(s).  Added by Emely Flores   Advance Care Planning Liaison                  Past Medical History:    Past Medical History:   Diagnosis Date     Arrhythmia      Hypertension      Pacemaker        Past Surgical History:    Past Surgical History:   Procedure Laterality Date     CARDIAC SURGERY       COLONOSCOPY       COLONOSCOPY N/A 7/24/2015    Procedure: COLONOSCOPY;  Surgeon: Kameron De Santiago MD;  Location: [facility] OR     INCISION AND DRAINAGE SHOULDER, COMBINED Right 8/7/2019    Procedure: INCISION AND DRAINAGE, SHOULDER REGION;  Surgeon: Elver Reeves MD;  Location: [facility] OR     LAPAROTOMY EXPLORATORY N/A 5/31/2019    Procedure: EXPLORATORY LAPAROTOMY WITH RESECTION OF SMALL BOWEL, JEJUNUM, TEMPORARY ABDOMINAL WALL CLOSURE, SEROSAL REPAIR;  Surgeon: Pastor Vilchis MD;  Location: [facility] OR     LAPAROTOMY EXPLORATORY N/A 6/2/2019    Procedure: EXPLORATORY LAPAROTOMY, SMALL BOWEL RESECTION-JEJUNUM, SMALL BOWEL ANASTOMOSIS, ABDOMINAL WASH OUT, FASCIAL CLOSURE;  Surgeon: Pastor Vilchis MD;  Location: [facility]  OR       Family History:    No family history on file.    Social History:  Marital Status:   [2]  Social History     Tobacco Use     Smoking status: Former Smoker     Packs/day: 0.00     Smokeless tobacco: Never Used   Substance Use Topics     Alcohol use: None     Drug use: None        Medications:    Acetaminophen 325 MG CAPS  albuterol (PROVENTIL) (2.5 MG/3ML) 0.083% neb solution  ASPIRIN PO  atorvastatin (LIPITOR) 10 MG tablet  BENAZEPRIL HCL PO  carvedilol (COREG) 6.25 MG tablet  doxycycline hyclate (VIBRAMYCIN) 100 MG capsule  Ezetimibe (ZETIA PO)  ferrous sulfate (FEROSUL) 325 (65 Fe) MG tablet  FLUoxetine (PROZAC) 10 MG tablet  furosemide (LASIX) 40 MG tablet  levothyroxine (SYNTHROID/LEVOTHROID) 125 MCG tablet  magnesium oxide 400 MG CAPS  melatonin 3 MG CAPS  metFORMIN (GLUCOPHAGE) 1000 MG tablet  Multiple Vitamins-Minerals (THERAPEUTIC M) TABS  Nitroglycerin (NITROSTAT SL)  Omega-3 Fatty Acids (OMEGA-3 FISH OIL PO)  polyethylene glycol (MIRALAX/GLYCOLAX) packet  potassium chloride (KLOR-CON) 20 MEQ packet  QUEtiapine (SEROQUEL) 25 MG tablet  spironolactone (ALDACTONE) 25 MG tablet  tamsulosin (FLOMAX) 0.4 MG capsule          Review of Systems   Constitutional: Negative for fever.   HENT: Negative for congestion.    Eyes: Negative for redness.   Respiratory: Negative for shortness of breath.    Cardiovascular: Negative for chest pain.   Gastrointestinal: Negative for abdominal pain.   Genitourinary: Negative for difficulty urinating.   Musculoskeletal: Negative for arthralgias and neck stiffness.   Skin: Negative for color change.   Neurological: Negative for headaches.   Psychiatric/Behavioral: Negative for confusion.       Physical Exam   BP: (!) 141/119  Pulse: 69  Temp: 97.5  F (36.4  C)  Resp: 20  SpO2: 98 %      Physical Exam    Primary Survey:    A - airway patent    B - breath sounds equal bilaterally    C - circulation intact distally    D - GCS 14 (E: 4, V: 4, M: 6), no apparent  disability    E - patient fully exposed to evaluate for injury        Secondary Survey:    General: awake, alert and in no acute distress.    HEENT: left periorbital contusion with edema with abrasion above left eye. Pupils equal, round & reactive to light, extraocular movements intact. Nose without epistaxis. Facial bones non-tender, no midface instability. Tympanic membranes clear bilaterally with no hemotympanum. Oropharynx clear. Moist mucous membranes. No blood in oropharynx.    Neck: patient refused cervical collar en route and continues to refuse at this time, but has no posterior midline tenderness, no jugular venous distension, trachea midline.    Heart: regular rate and rhythm, normal S1 & S2, no murmurs, rubs or gallops.    Lungs: clear to auscultation bilaterally, no wheezes, rales or rhonchi.    Abdomen: lower abdomen wound packed with black wound vacuum dressing without spreading erythema or purulent drainage, normoactive bowel sounds, soft, non-distended, non-tender. No ecchymosis. No rebound or guarding.    Back: no midline tenderness, step-offs or contusions.    Pelvis: stable.    Extremities: 2+/4+ pulses bilaterally, warm, well-perfused. No cyanosis or edema. No apparent obvious deformity. Tenderness with palpation of right shoulder which maintains full ROM against resistance with minimal pain. Acute tenderness with palpation of contused right knee which maintains normal flexion/extension against resistance. No other tenderness of bony prominences, full & painless ROM in leftupper and lower extremities with normal strength and sensation without deficit.    Skin: warm, no contusions, lacerations or abrasions.    Neurologic: awake, alert & oriented. No focal deficits.    ED Course        Procedures              Trauma Summary Disposition     Patient is trauma admission:  Trauma  Evaluation    Spine  Backboard removal time: Backboard not applied   C-collar and immobilization: patient refused en route  and refused again at [facility].  Full Primary and Secondary survey with appropriate immobilization of spine completed in exam section.     Neuro  GCS at arrival:  Motor 6=Obeys commands   Verbal 4=Confused   Eye Opening 4=Spontaneous   Total: 14     GCS at disposition: unchanged    1430  Patient has mild leukocytosis of 12.1 with mildly elevated lactic acid of 2.6, but no fever, tachycardia, tachypnea or hypotension. No obvious source of infection. No current evidence of sepsis, but will continue to monitor.    1530  Patient discussed with Dr. Garcia, [facility], who accepts the patient for transfer. He requests that calcium gluconate be given.    1540  Dr. Garcia calls back noting that the accepting Internal Medicine Service requests kaxylate which will be ordered for the patient.    The Lactic acid level is elevated due to dehydration and metformin use, at this time there is no sign of severe sepsis or septic shock.         Results for orders placed or performed during the hospital encounter of 10/13/19 (from the past 24 hour(s))   CBC with platelets differential   Result Value Ref Range    WBC 12.1 (H) 4.0 - 11.0 10e9/L    RBC Count 4.02 (L) 4.4 - 5.9 10e12/L    Hemoglobin 11.2 (L) 13.3 - 17.7 g/dL    Hematocrit 33.6 (L) 40.0 - 53.0 %    MCV 84 78 - 100 fl    MCH 27.9 26.5 - 33.0 pg    MCHC 33.3 31.5 - 36.5 g/dL    RDW 18.2 (H) 10.0 - 15.0 %    Platelet Count 350 150 - 450 10e9/L    Diff Method Automated Method     % Neutrophils 69.1 %    % Lymphocytes 25.9 %    % Monocytes 4.1 %    % Eosinophils 0.1 %    % Basophils 0.1 %    % Immature Granulocytes 0.7 %    Nucleated RBCs 0 0 /100    Absolute Neutrophil 8.3 1.6 - 8.3 10e9/L    Absolute Lymphocytes 3.1 0.8 - 5.3 10e9/L    Absolute Monocytes 0.5 0.0 - 1.3 10e9/L    Absolute Eosinophils 0.0 0.0 - 0.7 10e9/L    Absolute Basophils 0.0 0.0 - 0.2 10e9/L    Abs Immature Granulocytes 0.1 0 - 0.4 10e9/L    Absolute Nucleated RBC 0.0    Comprehensive metabolic panel    Result Value Ref Range    Sodium 130 (L) 133 - 144 mmol/L    Potassium 5.3 3.4 - 5.3 mmol/L    Chloride 97 94 - 109 mmol/L    Carbon Dioxide 22 20 - 32 mmol/L    Anion Gap 11 3 - 14 mmol/L    Glucose 154 (H) 70 - 99 mg/dL    Urea Nitrogen 90 (H) 7 - 30 mg/dL    Creatinine 4.05 (H) 0.66 - 1.25 mg/dL    GFR Estimate 13 (L) >60 mL/min/[1.73_m2]    GFR Estimate If Black 15 (L) >60 mL/min/[1.73_m2]    Calcium 9.0 8.5 - 10.1 mg/dL    Bilirubin Total 1.1 0.2 - 1.3 mg/dL    Albumin 3.1 (L) 3.4 - 5.0 g/dL    Protein Total 8.0 6.8 - 8.8 g/dL    Alkaline Phosphatase 120 40 - 150 U/L    ALT 22 0 - 70 U/L    AST 21 0 - 45 U/L   Lactic acid whole blood   Result Value Ref Range    Lactic Acid 2.6 (H) 0.7 - 2.0 mmol/L   Troponin I   Result Value Ref Range    Troponin I ES <0.015 0.000 - 0.045 ug/L   INR   Result Value Ref Range    INR 1.10 0.80 - 1.20   CK total   Result Value Ref Range    CK Total 68 30 - 300 U/L   CT Head w/o Contrast    Narrative    Exam: CT HEAD W/O CONTRAST    Clinical history:79 years Male Fall with head injury    Comparisons:None    Technique: Axial CT imaging of the head was performed Without  intervenous contrast.    FINDINGS: There is a left frontal scalp hematoma.   Ventricles and sulci are symmetric. The gray-white matter  differentiation throughout the brain is well maintained. There is  generalized cerebral and cerebellar volume loss. There is no evidence  of intracranial mass or hemorrhage. Visualized portions of the  paranasal sinuses and mastoid air cells are well aerated. There is no  evidence of skull fracture.      Impression    IMPRESSION: Left frontal scalp hematoma. There is no evidence of  intracranial hemorrhage or skull fracture.    STACIE RAMIREZ MD   Cervical spine CT w/o contrast    Narrative    Exam:CT CERVICAL SPINE W/O CONTRAST    History:79 years Male Fall with head injury, Fall with head injury    Comparisons:None    Technique: Axial CT imaging of the cervical spine was  performed.  Coronal and sagittal reconstructions were obtained.    Findings:Alignment of the cervical spine is normal. There is no  evidence of subluxation or fracture.  There is multilevel degenerative  disc disease of the cervical spine.      Impression    IMPRESSION: No evidence of subluxation or fracture of the cervical  spine.    STACIE RAMIREZ MD   XR Shoulder Right G/E 3 Views    Narrative    XR SHOULDER RT G/E 3 VW    HISTORY: 79 years Male Fall with right shoulder injury    COMPARISON: None    TECHNIQUE: 2 views right shoulder    FINDINGS: No evidence of dislocation.      Impression    IMPRESSION: No evidence of dislocation.    STACIE RAMIREZ MD   XR Knee Right 3 Views    Narrative    XR KNEE RT 3 VW    HISTORY: 79 years Male Fall with right knee injury    COMPARISON: None    TECHNIQUE: 3 views right knee    FINDINGS: There is degenerative enthesopathy of the patella. There is  no evidence of fracture or dislocation. There is tricompartmental  osteoarthritic change of moderate severity.      Impression    IMPRESSION: No evidence of fracture or dislocation of the right knee.    STACIE RAMIREZ MD   UA reflex to Microscopic and Culture   Result Value Ref Range    Color Urine Light Yellow     Appearance Urine Clear     Glucose Urine Negative NEG^Negative mg/dL    Bilirubin Urine Negative NEG^Negative    Ketones Urine Negative NEG^Negative mg/dL    Specific Gravity Urine 1.014 1.003 - 1.035    Blood Urine Moderate (A) NEG^Negative    pH Urine 6.5 4.7 - 8.0 pH    Protein Albumin Urine 10 (A) NEG^Negative mg/dL    Urobilinogen mg/dL Normal 0.0 - 2.0 mg/dL    Nitrite Urine Negative NEG^Negative    Leukocyte Esterase Urine Negative NEG^Negative    Source Unspecified Urine     RBC Urine 52 (H) 0 - 2 /HPF    WBC Urine 1 0 - 5 /HPF    Bacteria Urine None (A) NEG^Negative /HPF    Hyaline Casts 5 (A) OTO2^O - 2 /LPF       Medications   0.9% sodium chloride BOLUS (1,000 mLs Intravenous New Bag 10/13/19 8853)      Followed by   sodium chloride 0.9% infusion (has no administration in time range)   sodium polystyrene (KAYEXALATE) suspension 30 g (has no administration in time range)   calcium gluconate 10 % injection 1 g (1 g Intravenous New Bag 10/13/19 8809)       Assessments & Plan (with Medical Decision Making)   The patient is an 79 year old man with fall and finding of acute renal failure.    1) Fall -  no acute intracranial pathology or cervical fracture on CT. No osseous injury of right shoulder or right knee revealed on x-ray. Wound care provided for left forehead abrasion.     2) Acute renal failure - no anemia. Mild leukocytosis. No acute hepatic abnormality. Normal CK. New finding of acute renal failure with creatinine of 4.05 (previously 0.84 on 8/8/19). Mildly elevated lactic acid in the setting of dehydration and metformin use. Paced rhythm on EKG.    Patient will be transferred to [facility] for further evaluation and treatment.      I have reviewed the nursing notes.    I have reviewed the findings, diagnosis, plan and need for follow up with the patient.    New Prescriptions    No medications on file       Final diagnoses:   Unwitnessed fall   Acute renal failure, unspecified acute renal failure type (H)   Contusion of forehead, initial encounter   Contusion of right shoulder, initial encounter   Contusion of right knee, initial encounter       10/13/2019   [facility]     Ashu Rivera MD  10/13/19 6176

## 2019-10-13 NOTE — ED NOTES
Pt's cousin Rosa contacted floor. Update was given as per pt's request. Rosa has not been able to get ahold of pt's son but has also left him messages regarding pt's condition. Rosa verbalizes understanding of pt's transfer.

## 2019-10-18 LAB
BACTERIA SPEC CULT: ABNORMAL
SPECIMEN SOURCE: ABNORMAL

## 2019-10-19 LAB
BACTERIA SPEC CULT: NORMAL
SPECIMEN SOURCE: NORMAL

## 2019-11-02 ENCOUNTER — HOSPITAL ENCOUNTER (EMERGENCY)
Facility: HOSPITAL | Age: 79
Discharge: HOME | End: 2019-11-02
Payer: MEDICARE

## 2019-11-02 NOTE — ED NOTES
Pt here for dressing change supplies that were not left with him by wound care over the weekend. Attempting to locate purochol and hydrogel to no avail. attempting to get a hold of [facility] on call MD to see what to do about pts dressing change. Pt does not need to see a doctor.

## 2019-11-02 NOTE — ED NOTES
Spoke with MD robison again. She reports that [facility] pharmacy is looking into finding the ointments. If we are unable to find them, per MD verbal order we are to give pt two abdominal pads to use for next two days of dressing change and then follow up with [id number] on Monday.      [facility] does not carry these ointments in stock at the store.

## 2019-11-02 NOTE — ED NOTES
Called [facility]. They did the dressing change on Thursday and allegedly took the pts dressing change ointments home with them instead of leaving them with the pt for his at home dressing changes for the weekend. [facility] is unable to get a hold of the nurse who did the dressing change.

## 2019-11-02 NOTE — ED NOTES
No call form [facility]. Gave pt abd pads to put on wound until he is able to follow up with [facility] on Monday. Son with pt and has been given all instructions on wound care.

## 2019-11-09 NOTE — ED NOTES
Problem: Alteration in Thoughts and Perception  Goal: Treatment Goal: Gain control of psychotic behaviors/thinking, reduce/eliminate presenting symptoms and demonstrate improved reality functioning upon discharge  Outcome: Progressing  Goal: Verbalize thoughts and feelings  Description  Interventions:  - Promote a nonjudgmental and trusting relationship with the patient through active listening and therapeutic communication  - Assess patient's level of functioning, behavior and potential for risk  - Engage patient in 1 on 1 interactions  - Encourage patient to express fears, feelings, frustrations, and discuss symptoms    - [place] patient to reality, help patient recognize reality-based thinking   - Administer medications as ordered and assess for potential side effects  - Provide the patient education related to the signs and symptoms of the illness and desired effects of prescribed medications  Outcome: Progressing  Goal: Refrain from acting on delusional thinking/internal stimuli  Description  Interventions:  - Monitor patient closely, per order   - Utilize least restrictive measures   - Set reasonable limits, give positive feedback for acceptable   - Administer medications as ordered and monitor of potential side effects  Outcome: Progressing  Goal: Agree to be compliant with medication regime, as prescribed and report medication side effects  Description  Interventions:  - Offer appropriate PRN medication and supervise ingestion; conduct AIMS, as needed   Outcome: Progressing  Goal: Recognize dysfunctional thoughts, communicate reality-based thoughts at the time of discharge  Description  Interventions:  - Provide medication and psycho-education to assist patient in compliance and developing insight into his/her illness   Outcome: Progressing     Problem: SUBSTANCE USE/ABUSE  Goal: By discharge, patient will have ongoing treatment plan addressing chemical dependency  Description  INTERVENTIONS:  - Assist Tarik is working on getting pts dressing change supplies and will be calling us back as soon as she can find the ointments.    patient with resources and/or appointments for ongoing recovery based living  Outcome: Progressing

## 2020-01-10 ENCOUNTER — APPOINTMENT (OUTPATIENT)
Dept: CT IMAGING | Facility: HOSPITAL | Age: 80
End: 2020-01-10
Attending: EMERGENCY MEDICINE
Payer: MEDICARE

## 2020-01-10 ENCOUNTER — HOSPITAL ENCOUNTER (EMERGENCY)
Facility: HOSPITAL | Age: 80
Discharge: HOME | End: 2020-01-10
Attending: EMERGENCY MEDICINE | Admitting: EMERGENCY MEDICINE
Payer: MEDICARE

## 2020-01-10 VITALS
OXYGEN SATURATION: 98 % | TEMPERATURE: 97.6 F | BODY MASS INDEX: 33.45 KG/M2 | SYSTOLIC BLOOD PRESSURE: 160 MMHG | WEIGHT: 220 LBS | DIASTOLIC BLOOD PRESSURE: 89 MMHG | RESPIRATION RATE: 18 BRPM
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
## 2020-01-10 DIAGNOSIS — S00.83XA CONTUSION OF FOREHEAD, INITIAL ENCOUNTER: ICD-10-CM

## 2020-01-10 LAB
ALBUMIN SERPL-MCNC: 3 G/DL (ref 3.4–5)
ALP SERPL-CCNC: 137 U/L (ref 40–150)
ALT SERPL W P-5'-P-CCNC: 24 U/L (ref 0–70)
ANION GAP SERPL CALCULATED.3IONS-SCNC: 10 MMOL/L (ref 3–14)
AST SERPL W P-5'-P-CCNC: 25 U/L (ref 0–45)
BASOPHILS # BLD AUTO: 0 10E9/L (ref 0–0.2)
BASOPHILS NFR BLD AUTO: 0.3 %
BILIRUB SERPL-MCNC: 0.9 MG/DL (ref 0.2–1.3)
BUN SERPL-MCNC: 21 MG/DL (ref 7–30)
CALCIUM SERPL-MCNC: 8 MG/DL (ref 8.5–10.1)
CHLORIDE SERPL-SCNC: 107 MMOL/L (ref 94–109)
CO2 SERPL-SCNC: 24 MMOL/L (ref 20–32)
CREAT SERPL-MCNC: 1.49 MG/DL (ref 0.66–1.25)
DIFFERENTIAL METHOD BLD: ABNORMAL
EOSINOPHIL # BLD AUTO: 0.3 10E9/L (ref 0–0.7)
EOSINOPHIL NFR BLD AUTO: 2.9 %
ERYTHROCYTE [DISTWIDTH] IN BLOOD BY AUTOMATED COUNT: 16.1 % (ref 10–15)
GFR SERPL CREATININE-BSD FRML MDRD: 43 ML/MIN/{1.73_M2}
GLUCOSE SERPL-MCNC: 114 MG/DL (ref 70–99)
HCT VFR BLD AUTO: 27.5 % (ref 40–53)
HGB BLD-MCNC: 9.1 G/DL (ref 13.3–17.7)
IMM GRANULOCYTES # BLD: 0 10E9/L (ref 0–0.4)
IMM GRANULOCYTES NFR BLD: 0.5 %
INR PPP: 1.26 (ref 0.86–1.14)
LYMPHOCYTES # BLD AUTO: 3.1 10E9/L (ref 0.8–5.3)
LYMPHOCYTES NFR BLD AUTO: 35.6 %
MCH RBC QN AUTO: 31.9 PG (ref 26.5–33)
MCHC RBC AUTO-ENTMCNC: 33.1 G/DL (ref 31.5–36.5)
MCV RBC AUTO: 97 FL (ref 78–100)
MONOCYTES # BLD AUTO: 0.7 10E9/L (ref 0–1.3)
MONOCYTES NFR BLD AUTO: 7.8 %
NEUTROPHILS # BLD AUTO: 4.6 10E9/L (ref 1.6–8.3)
NEUTROPHILS NFR BLD AUTO: 52.9 %
NRBC # BLD AUTO: 0 10*3/UL
NRBC BLD AUTO-RTO: 0 /100
PLATELET # BLD AUTO: 214 10E9/L (ref 150–450)
POTASSIUM SERPL-SCNC: 3.6 MMOL/L (ref 3.4–5.3)
PROT SERPL-MCNC: 6.1 G/DL (ref 6.8–8.8)
RBC # BLD AUTO: 2.85 10E12/L (ref 4.4–5.9)
SODIUM SERPL-SCNC: 141 MMOL/L (ref 133–144)
WBC # BLD AUTO: 8.7 10E9/L (ref 4–11)

## 2020-01-10 PROCEDURE — 70450 CT HEAD/BRAIN W/O DYE: CPT | Mod: TC

## 2020-01-10 PROCEDURE — 99284 EMERGENCY DEPT VISIT MOD MDM: CPT | Mod: Z6 | Performed by: EMERGENCY MEDICINE

## 2020-01-10 PROCEDURE — 85025 COMPLETE CBC W/AUTO DIFF WBC: CPT | Performed by: EMERGENCY MEDICINE

## 2020-01-10 PROCEDURE — 80053 COMPREHEN METABOLIC PANEL: CPT | Performed by: EMERGENCY MEDICINE

## 2020-01-10 PROCEDURE — 99284 EMERGENCY DEPT VISIT MOD MDM: CPT | Mod: 25

## 2020-01-10 PROCEDURE — 36415 COLL VENOUS BLD VENIPUNCTURE: CPT | Performed by: EMERGENCY MEDICINE

## 2020-01-10 PROCEDURE — 85610 PROTHROMBIN TIME: CPT | Performed by: EMERGENCY MEDICINE

## 2020-01-10 ASSESSMENT — ENCOUNTER SYMPTOMS
DIFFICULTY URINATING: 0
ABDOMINAL PAIN: 0
COLOR CHANGE: 0
EYE REDNESS: 0
CONFUSION: 0
NECK STIFFNESS: 0
SHORTNESS OF BREATH: 0
ARTHRALGIAS: 0
HEADACHES: 0
FEVER: 0

## 2020-01-10 NOTE — ED PROVIDER NOTES
History     Chief Complaint   Patient presents with     Fall     HPI  Ag Perez is a 79 year old male who presented to the emergency department with complaints of fall.  Patient tripped on snow and fell down hitting the forehead against a hard surface.  The incident happened just a few minutes ago prior to arrival at the ED.  He did not lose any consciousness.  He is not on any blood thinners.  No vomiting, headache or neck pains.  Patient also reports that he fell down a few days ago and landed on the right knee sustaining a bruise over the after slipping on the snow.  He denies any other injuries.  Denies chest pain, shortness of breath, unilateral body weakness, speech changes, facial droop.    Allergies:  Allergies   Allergen Reactions     Crestor [Rosuvastatin]      Rivaroxaban Other (See Comments)     Bleed       Problem List:    Patient Active Problem List    Diagnosis Date Noted     Abscess of right shoulder 08/06/2019     Priority: Medium     Gram-positive bacteremia 08/06/2019     Priority: Medium     Open abdominal wall wound 08/06/2019     Priority: Medium     Benign essential hypertension 08/06/2019     Priority: Medium     Gastroesophageal reflux disease with esophagitis 08/06/2019     Priority: Medium     SBO (small bowel obstruction) (H) 05/31/2019     Priority: Medium     Sepsis (H) 05/31/2019     Priority: Medium     Acute abdominal pain 05/31/2019     Priority: Medium     Chronic atrial fibrillation 05/31/2019     Priority: Medium     Small bowel perforation (H) 05/31/2019     Priority: Medium     Cardiac pacemaker in situ 05/31/2019     Priority: Medium     Malignant neoplasm of transverse colon (H) 04/16/2019     Priority: Medium     Advance care planning 08/11/2016     Priority: Medium     Advance Care Planning 8/11/2016: Receipt of ACP document:  Received: Resuscitation Guidelines order which was signed and dated by provider on 6/2/16.  Document previously scanned on 6/3/16.  Order  reviewed and found to be valid.  Code Status needs to be updated to reflect choices in most recent ACP document: DNR. Confirmed/documented designated decision maker(s).  Added by Emely Flores   Advance Care Planning Liaison                  Past Medical History:    Past Medical History:   Diagnosis Date     Arrhythmia      Hypertension      Pacemaker        Past Surgical History:    Past Surgical History:   Procedure Laterality Date     CARDIAC SURGERY       COLONOSCOPY       COLONOSCOPY N/A 7/24/2015    Procedure: COLONOSCOPY;  Surgeon: Kameron De Santiago MD;  Location: [facility]     INCISION AND DRAINAGE SHOULDER, COMBINED Right 8/7/2019    Procedure: INCISION AND DRAINAGE, SHOULDER REGION;  Surgeon: Elver Reeves MD;  Location: [facility]     LAPAROTOMY EXPLORATORY N/A 5/31/2019    Procedure: EXPLORATORY LAPAROTOMY WITH RESECTION OF SMALL BOWEL, JEJUNUM, TEMPORARY ABDOMINAL WALL CLOSURE, SEROSAL REPAIR;  Surgeon: Pastor Vilchis MD;  Location: [facility]     LAPAROTOMY EXPLORATORY N/A 6/2/2019    Procedure: EXPLORATORY LAPAROTOMY, SMALL BOWEL RESECTION-JEJUNUM, SMALL BOWEL ANASTOMOSIS, ABDOMINAL WASH OUT, FASCIAL CLOSURE;  Surgeon: Pastor Vilchis MD;  Location: [facility]       Family History:    No family history on file.    Social History:  Marital Status:   [2]  Social History     Tobacco Use     Smoking status: Former Smoker     Packs/day: 0.00     Smokeless tobacco: Never Used   Substance Use Topics     Alcohol use: Not on file     Drug use: Not on file        Medications:    Acetaminophen 325 MG CAPS  albuterol (PROVENTIL) (2.5 MG/3ML) 0.083% neb solution  ASPIRIN PO  atorvastatin (LIPITOR) 10 MG tablet  BENAZEPRIL HCL PO  carvedilol (COREG) 6.25 MG tablet  doxycycline hyclate (VIBRAMYCIN) 100 MG capsule  Ezetimibe (ZETIA PO)  ferrous sulfate (FEROSUL) 325 (65 Fe) MG tablet  FLUoxetine (PROZAC) 10 MG tablet  furosemide (LASIX) 40 MG tablet  levothyroxine (SYNTHROID/LEVOTHROID) 125 MCG  tablet  magnesium oxide 400 MG CAPS  melatonin 3 MG CAPS  metFORMIN (GLUCOPHAGE) 1000 MG tablet  Multiple Vitamins-Minerals (THERAPEUTIC M) TABS  Nitroglycerin (NITROSTAT SL)  Omega-3 Fatty Acids (OMEGA-3 FISH OIL PO)  polyethylene glycol (MIRALAX/GLYCOLAX) packet  potassium chloride (KLOR-CON) 20 MEQ packet  QUEtiapine (SEROQUEL) 25 MG tablet  spironolactone (ALDACTONE) 25 MG tablet  tamsulosin (FLOMAX) 0.4 MG capsule          Review of Systems   Constitutional: Negative for fever.   HENT: Negative for congestion.    Eyes: Negative for redness.   Respiratory: Negative for shortness of breath.    Cardiovascular: Negative for chest pain.   Gastrointestinal: Negative for abdominal pain.   Genitourinary: Negative for difficulty urinating.   Musculoskeletal: Negative for arthralgias and neck stiffness.   Skin: Negative for color change.   Neurological: Negative for headaches.   Psychiatric/Behavioral: Negative for confusion.   All other systems reviewed and are negative.      Physical Exam   BP: 160/89  Heart Rate: 62  Temp: 97.6  F (36.4  C)  Resp: 18  Weight: 99.8 kg (220 lb)  SpO2: 98 %      Physical Exam  Vitals signs and nursing note reviewed.   Constitutional:       General: He is not in acute distress.     Appearance: He is well-developed. He is not diaphoretic.   HENT:      Head: Normocephalic.     Eyes:      General: No scleral icterus.     Pupils: Pupils are equal, round, and reactive to light.   Cardiovascular:      Rate and Rhythm: Normal rate and regular rhythm.      Heart sounds: Normal heart sounds.   Pulmonary:      Effort: Pulmonary effort is normal. No respiratory distress.      Breath sounds: Normal breath sounds. No stridor.   Abdominal:      General: Bowel sounds are normal. There is no distension.      Palpations: Abdomen is soft.      Tenderness: There is no abdominal tenderness.   Musculoskeletal:         General: No tenderness or deformity.   Skin:     General: Skin is warm.      Findings: No  rash.   Neurological:      Mental Status: He is alert and oriented to person, place, and time.      Cranial Nerves: No cranial nerve deficit.         ED Course       Procedures      Results for orders placed or performed during the hospital encounter of 01/10/20 (from the past 24 hour(s))   CBC with platelets differential   Result Value Ref Range    WBC 8.7 4.0 - 11.0 10e9/L    RBC Count 2.85 (L) 4.4 - 5.9 10e12/L    Hemoglobin 9.1 (L) 13.3 - 17.7 g/dL    Hematocrit 27.5 (L) 40.0 - 53.0 %    MCV 97 78 - 100 fl    MCH 31.9 26.5 - 33.0 pg    MCHC 33.1 31.5 - 36.5 g/dL    RDW 16.1 (H) 10.0 - 15.0 %    Platelet Count 214 150 - 450 10e9/L    Diff Method Automated Method     % Neutrophils 52.9 %    % Lymphocytes 35.6 %    % Monocytes 7.8 %    % Eosinophils 2.9 %    % Basophils 0.3 %    % Immature Granulocytes 0.5 %    Nucleated RBCs 0 0 /100    Absolute Neutrophil 4.6 1.6 - 8.3 10e9/L    Absolute Lymphocytes 3.1 0.8 - 5.3 10e9/L    Absolute Monocytes 0.7 0.0 - 1.3 10e9/L    Absolute Eosinophils 0.3 0.0 - 0.7 10e9/L    Absolute Basophils 0.0 0.0 - 0.2 10e9/L    Abs Immature Granulocytes 0.0 0 - 0.4 10e9/L    Absolute Nucleated RBC 0.0    Comprehensive metabolic panel   Result Value Ref Range    Sodium 141 133 - 144 mmol/L    Potassium 3.6 3.4 - 5.3 mmol/L    Chloride 107 94 - 109 mmol/L    Carbon Dioxide 24 20 - 32 mmol/L    Anion Gap 10 3 - 14 mmol/L    Glucose 114 (H) 70 - 99 mg/dL    Urea Nitrogen 21 7 - 30 mg/dL    Creatinine 1.49 (H) 0.66 - 1.25 mg/dL    GFR Estimate 43 (L) >60 mL/min/[1.73_m2]    GFR Estimate If Black 50 (L) >60 mL/min/[1.73_m2]    Calcium 8.0 (L) 8.5 - 10.1 mg/dL    Bilirubin Total 0.9 0.2 - 1.3 mg/dL    Albumin 3.0 (L) 3.4 - 5.0 g/dL    Protein Total 6.1 (L) 6.8 - 8.8 g/dL    Alkaline Phosphatase 137 40 - 150 U/L    ALT 24 0 - 70 U/L    AST 25 0 - 45 U/L   INR   Result Value Ref Range    INR 1.26 (H) 0.86 - 1.14   CT Head w/o Contrast    Narrative    PROCEDURE: CT HEAD W/O CONTRAST     HISTORY:  Fall with contusion to the forehead.    COMPARISON: October 2018    TECHNIQUE:  Helical images of the head from the foramen magnum to the  vertex were obtained without contrast.    FINDINGS: There is enlargement of the ventricular system and cortical  sulci consistent with atrophy. There are no masses ventricular shifts  or extracerebral collections. There is some mild widening of the  cerebellar folia consistent with cerebellar atrophy. The brainstem is  intact. Cranial vault appears normal. There appears to be a right  frontal scalp hematoma.  The visualized paranasal sinuses are clear.      Impression    IMPRESSION: No acute brain abnormality.      EMMANUEL BILLY MD       Medications - No data to display    Assessments & Plan (with Medical Decision Making)   Fall due to slipping on ice:  Contusion to the forehead:  Patient slipped and fell on ice few minutes prior to arrival at the ED.  There was no loss of consciousness and patient is not on any blood thinners.  Sustained contusion to the forehead and CT scan of the head and did not show any acute brain abnormality.  Other labs done showed normal CBC, CMP and INR of 1.26.  Patient monitored the ED for at least 2 hours and remained stable.  Subsequently discharged home.  Follow-up with PCP as outpatient.return to ED if condition worsens.    I have reviewed the nursing notes.    I have reviewed the findings, diagnosis, plan and need for follow up with the patient.    New Prescriptions    No medications on file       Final diagnoses:   Fall due to slipping on ice or snow, initial encounter   Contusion of forehead, initial encounter       1/10/2020   [facility]     David Smiley MD  01/10/20 [id number]

## 2020-01-10 NOTE — ED AVS SNAPSHOT
[facility]  [address] [address]  Phone:  [phone]                                    Ag Perez   MRN: [id number]    Department:  [facility]   Date of Visit:  1/10/2020           After Visit Summary Signature Page    I have received my discharge instructions, and my questions have been answered. I have discussed any challenges I see with this plan with the nurse or doctor.    ..........................................................................................................................................  Patient/Patient Representative Signature      ..........................................................................................................................................  Patient Representative Print Name and Relationship to Patient    ..................................................               ................................................  Date                                   Time    ..........................................................................................................................................  Reviewed by Signature/Title    ...................................................              ..............................................  Date                                               Time          22EPIC Rev 08/18

## 2020-01-10 NOTE — ED NOTES
Patient presents via EMS from a fall he had outside of [facility]. There is a step there and states he just tripped over it and fell, denies any loss of consciousness or dizziness. Held pressure to a small area above the right eye and it did stop bleeding. Pt also ended up with 3 skin tears on his left hand.

## 2021-01-01 ENCOUNTER — MEDICAL CORRESPONDENCE (OUTPATIENT)
Dept: MRI IMAGING | Facility: HOSPITAL | Age: 81
End: 2021-01-01
Payer: COMMERCIAL

## 2021-04-04 NOTE — PROVIDER NOTIFICATION
Dr Vilchis called. Updated with pt status. NGT was not draining any returns. Air echo auscultated with pop noted. OK to flush with sterile H20. Flushed with 30 ml with black returns to LIS NGT re taped at 59 cm at tip of nose.   21 y/o M with right pinky dislocation, NAD, stable VS, finger reduced with no cx pt will use same splint he came in with from the urgent care, he will f/u with Dr. Neville

## 2022-10-26 NOTE — SIGNIFICANT EVENT
Anesthesia here  Seeing patinet ordering chest xray   For patients difficulty breathing   
Placed on face mask to  Help  With  Oxygen delivery   
Xray here to take chest xray   
No

## (undated) DEVICE — DRSG-KERLIX ROLL 4.5 X 4.1YD

## (undated) DEVICE — SCD SLEEVE-KNEE REG.

## (undated) DEVICE — SUTURE-PDS 2-0 SH Z317H

## (undated) DEVICE — CAUTERY-EXTENDED 6.5" BLADE

## (undated) DEVICE — PACK-CLOSING-CUSTOM

## (undated) DEVICE — PACK-SHOULDER ARTHROSCOPY-CUSTOM

## (undated) DEVICE — Device

## (undated) DEVICE — LINEAR CUTTER-BLUE 75MM PROXIMATE

## (undated) DEVICE — DRAPE-IOBAN 2 60CM X 60CM

## (undated) DEVICE — DRSG-KERLIX 6 X 6 3/4 FLUFF

## (undated) DEVICE — APPLICATOR-CHLORAPREP 26ML TINTED CHG 2%+ 70% IPA-SURGICAL

## (undated) DEVICE — CAUTERY PAD-POLYHESIVE II ADULT

## (undated) DEVICE — CLAMP COVER-RADIOPAQUE FABRIC

## (undated) DEVICE — IRRIGATION-NACL 1000ML

## (undated) DEVICE — CONNECTOR-Y 3/8 IN. STERILE

## (undated) DEVICE — TUBE-SALEM SUMP 18FR STOMACH SUCTION

## (undated) DEVICE — COVER-X-RAY CASSETTE Z-DRAPE

## (undated) DEVICE — LIGHT HANDLE COVER

## (undated) DEVICE — SLING-ARM-LARGE

## (undated) DEVICE — DRAIN-JP BULB RESERVOIR 100CC

## (undated) DEVICE — CATH TRAY-16FR METER W/STATLOCK LATEX]

## (undated) DEVICE — SUTURE-PDS II 0 CT-1 Z346H

## (undated) DEVICE — DRSG-SPONGE X-RAY 4 X 4

## (undated) DEVICE — DRAIN-HEMOVAC 1/8" W/TROCAR

## (undated) DEVICE — SUTURE-ETHIBOND 1 CTX CX30D

## (undated) DEVICE — GLV-8.0 PROTEXIS PI BLUE W/NEU-THERA LF/PF

## (undated) DEVICE — SWABSTICK-BENZOIN

## (undated) DEVICE — CAUTERY PENCIL-SMOKE EVACUATION

## (undated) DEVICE — LABEL-STERILE PREPRINTED FOR OR

## (undated) DEVICE — SCD SLEEVE-KNEE LG

## (undated) DEVICE — SUTURE-ETHILON 2-0 PSLX 1697H

## (undated) DEVICE — GLV-7.5 PROTEXIS PI CLASSIC LF/PF

## (undated) DEVICE — PACK-LAPAROTOMY-CUSTOM

## (undated) DEVICE — DRSG-ABDOMINAL 8 X 10

## (undated) DEVICE — RELOAD-BLUE 75MM PROXIMATE

## (undated) DEVICE — DRAIN-JP 10MM FLAT W/TROCAR

## (undated) DEVICE — IRRIGATION-H2O 1000ML

## (undated) DEVICE — CAUTERY TIP CLEANER

## (undated) DEVICE — GOWN-SURG XL LVL 3 REINFORCED

## (undated) DEVICE — CANISTER-SUCTION 2000CC

## (undated) DEVICE — BIN-STAPLE CART

## (undated) DEVICE — FLUID TRAP FOR VIROSAFE FILTERS

## (undated) DEVICE — DRSG-SPONGE STERILE 8 X 4

## (undated) DEVICE — SPONGE-LAPAROTOMY PADS 18 X 18

## (undated) DEVICE — TUBING-SUCTION 20FT

## (undated) DEVICE — LINEAR CUTTER-GREEN 100MM PROXIMATE

## (undated) DEVICE — SENSOR-OXISENSOR II ADULT

## (undated) DEVICE — MARKER-SKIN REG

## (undated) DEVICE — LIGASURE-IMPACT SEALER/DIVIDER

## (undated) DEVICE — SUTURE-VICRYL 3-0 SH J416H

## (undated) DEVICE — SUTURE-SILK 3-0 SH POP-OFF C013D

## (undated) DEVICE — TAPE-MEDIPORE 4" X 2YD

## (undated) DEVICE — BLANKET-BAIR UPPER BODY

## (undated) RX ORDER — NEOSTIGMINE METHYLSULFATE 1 MG/ML
VIAL (ML) INJECTION
Status: DISPENSED
Start: 2019-06-02

## (undated) RX ORDER — PROPOFOL 10 MG/ML
INJECTION, EMULSION INTRAVENOUS
Status: DISPENSED
Start: 2019-06-02

## (undated) RX ORDER — DEXAMETHASONE SODIUM PHOSPHATE 10 MG/ML
INJECTION, SOLUTION INTRAMUSCULAR; INTRAVENOUS
Status: DISPENSED
Start: 2019-08-07

## (undated) RX ORDER — FUROSEMIDE 10 MG/ML
INJECTION INTRAMUSCULAR; INTRAVENOUS
Status: DISPENSED
Start: 2019-05-31

## (undated) RX ORDER — FENTANYL CITRATE 50 UG/ML
INJECTION, SOLUTION INTRAMUSCULAR; INTRAVENOUS
Status: DISPENSED
Start: 2019-08-07

## (undated) RX ORDER — KETAMINE HCL IN NACL, ISO-OSM 100MG/10ML
SYRINGE (ML) INJECTION
Status: DISPENSED
Start: 2019-06-02

## (undated) RX ORDER — DEXAMETHASONE SODIUM PHOSPHATE 10 MG/ML
INJECTION, SOLUTION INTRAMUSCULAR; INTRAVENOUS
Status: DISPENSED
Start: 2019-06-02

## (undated) RX ORDER — PROPOFOL 10 MG/ML
INJECTION, EMULSION INTRAVENOUS
Status: DISPENSED
Start: 2019-05-31

## (undated) RX ORDER — ROCURONIUM BROMIDE 50 MG/5 ML
SYRINGE (ML) INTRAVENOUS
Status: DISPENSED
Start: 2019-05-31

## (undated) RX ORDER — EPHEDRINE SULFATE 50 MG/ML
INJECTION, SOLUTION INTRAVENOUS
Status: DISPENSED
Start: 2019-05-31

## (undated) RX ORDER — FENTANYL CITRATE 50 UG/ML
INJECTION, SOLUTION INTRAMUSCULAR; INTRAVENOUS
Status: DISPENSED
Start: 2019-05-31

## (undated) RX ORDER — FENTANYL CITRATE 50 UG/ML
INJECTION, SOLUTION INTRAMUSCULAR; INTRAVENOUS
Status: DISPENSED
Start: 2019-06-02

## (undated) RX ORDER — PROPOFOL 10 MG/ML
INJECTION, EMULSION INTRAVENOUS
Status: DISPENSED
Start: 2019-08-07

## (undated) RX ORDER — ONDANSETRON 2 MG/ML
INJECTION INTRAMUSCULAR; INTRAVENOUS
Status: DISPENSED
Start: 2019-05-31

## (undated) RX ORDER — DEXAMETHASONE SODIUM PHOSPHATE 10 MG/ML
INJECTION, SOLUTION INTRAMUSCULAR; INTRAVENOUS
Status: DISPENSED
Start: 2019-05-31

## (undated) RX ORDER — LIDOCAINE HYDROCHLORIDE 20 MG/ML
INJECTION, SOLUTION EPIDURAL; INFILTRATION; INTRACAUDAL; PERINEURAL
Status: DISPENSED
Start: 2019-08-07

## (undated) RX ORDER — ONDANSETRON 2 MG/ML
INJECTION INTRAMUSCULAR; INTRAVENOUS
Status: DISPENSED
Start: 2019-08-07

## (undated) RX ORDER — GLYCOPYRROLATE 0.2 MG/ML
INJECTION, SOLUTION INTRAMUSCULAR; INTRAVENOUS
Status: DISPENSED
Start: 2019-06-02

## (undated) RX ORDER — ONDANSETRON 2 MG/ML
INJECTION INTRAMUSCULAR; INTRAVENOUS
Status: DISPENSED
Start: 2019-06-02

## (undated) RX ORDER — LIDOCAINE HYDROCHLORIDE 20 MG/ML
INJECTION, SOLUTION EPIDURAL; INFILTRATION; INTRACAUDAL; PERINEURAL
Status: DISPENSED
Start: 2019-05-31